# Patient Record
Sex: FEMALE | Race: WHITE | Employment: FULL TIME | URBAN - METROPOLITAN AREA
[De-identification: names, ages, dates, MRNs, and addresses within clinical notes are randomized per-mention and may not be internally consistent; named-entity substitution may affect disease eponyms.]

---

## 2017-01-05 ENCOUNTER — OFFICE VISIT (OUTPATIENT)
Dept: FAMILY MEDICINE CLINIC | Facility: CLINIC | Age: 62
End: 2017-01-05

## 2017-01-05 VITALS
HEART RATE: 73 BPM | HEIGHT: 61.18 IN | SYSTOLIC BLOOD PRESSURE: 139 MMHG | BODY MASS INDEX: 25.23 KG/M2 | TEMPERATURE: 99 F | WEIGHT: 133.63 LBS | DIASTOLIC BLOOD PRESSURE: 78 MMHG | RESPIRATION RATE: 17 BRPM

## 2017-01-05 DIAGNOSIS — Z00.00 ROUTINE PHYSICAL EXAMINATION: Primary | ICD-10-CM

## 2017-01-05 DIAGNOSIS — F32.A DEPRESSION, UNSPECIFIED DEPRESSION TYPE: ICD-10-CM

## 2017-01-05 DIAGNOSIS — Z12.31 ENCOUNTER FOR SCREENING MAMMOGRAM FOR BREAST CANCER: ICD-10-CM

## 2017-01-05 DIAGNOSIS — J01.90 SUBACUTE SINUSITIS, UNSPECIFIED LOCATION: ICD-10-CM

## 2017-01-05 DIAGNOSIS — J45.30 MILD PERSISTENT ASTHMA WITHOUT COMPLICATION: ICD-10-CM

## 2017-01-05 DIAGNOSIS — L30.9 ECZEMA, UNSPECIFIED TYPE: ICD-10-CM

## 2017-01-05 DIAGNOSIS — R04.0 EPISTAXIS: ICD-10-CM

## 2017-01-05 PROCEDURE — 99396 PREV VISIT EST AGE 40-64: CPT | Performed by: FAMILY MEDICINE

## 2017-01-05 PROCEDURE — 90471 IMMUNIZATION ADMIN: CPT | Performed by: FAMILY MEDICINE

## 2017-01-05 PROCEDURE — 90736 HZV VACCINE LIVE SUBQ: CPT | Performed by: FAMILY MEDICINE

## 2017-01-05 RX ORDER — CHOLECALCIFEROL (VITAMIN D3) 50 MCG
2000 TABLET ORAL DAILY
Qty: 90 TABLET | Refills: 3 | Status: SHIPPED | OUTPATIENT
Start: 2017-01-05 | End: 2017-02-04

## 2017-01-05 RX ORDER — AZITHROMYCIN 250 MG/1
TABLET, FILM COATED ORAL
Qty: 12 TABLET | Refills: 0 | Status: SHIPPED | OUTPATIENT
Start: 2017-01-05 | End: 2017-02-07

## 2017-01-05 NOTE — PROGRESS NOTES
HPI:    Patient ID: Drew Salazar is a 62year old female. HPI    Review of Systems   Constitutional: Negative. HENT: Positive for postnasal drip and sinus pressure. Respiratory: Negative. Cardiovascular: Negative.     Gastrointestinal: Nega normal.   Nasal mucosa swollen, maxillary sinus tenderness   Neck: No thyromegaly present. Cardiovascular: Normal rate, regular rhythm and normal heart sounds. No murmur heard.   Pulmonary/Chest: Effort normal and breath sounds normal.   Breasts withou Cholecalciferol (VITAMIN D) 2000 UNITS Oral Tab 90 tablet 3      Sig: Take 2,000 Int'l Units by mouth daily. azithromycin (ZITHROMAX Z-MEME) 250 MG Oral Tab 12 tablet 0      Sig: Take two tablets by mouth today, then one tablet daily. Then repeat.

## 2017-01-06 LAB
ABSOLUTE BASOPHILS: 67 CELLS/UL (ref 0–200)
ABSOLUTE EOSINOPHILS: 840 CELLS/UL (ref 15–500)
ABSOLUTE LYMPHOCYTES: 1663 CELLS/UL (ref 850–3900)
ABSOLUTE MONOCYTES: 370 CELLS/UL (ref 200–950)
ABSOLUTE NEUTROPHILS: 5460 CELLS/UL (ref 1500–7800)
BASOPHILS: 0.8 %
BUN/CREATININE RATIO: 16 (CALC) (ref 6–22)
BUN: 16 MG/DL (ref 7–25)
CALCIUM: 9.5 MG/DL (ref 8.6–10.4)
CARBON DIOXIDE: 31 MMOL/L (ref 20–31)
CHLORIDE: 101 MMOL/L (ref 98–110)
CHOL/HDLC RATIO: 2.6 (CALC)
CHOLESTEROL, TOTAL: 253 MG/DL (ref 125–200)
CREATININE: 1 MG/DL (ref 0.5–0.99)
EGFR IF AFRICN AM: 71 ML/MIN/1.73M2
EGFR IF NONAFRICN AM: 61 ML/MIN/1.73M2
EOSINOPHILS: 10 %
GLUCOSE: 78 MG/DL (ref 65–99)
HDL CHOLESTEROL: 97 MG/DL
HEMATOCRIT: 40.9 % (ref 35–45)
HEMOGLOBIN: 13.8 G/DL (ref 11.7–15.5)
LDL-CHOLESTEROL: 124 MG/DL (CALC)
LYMPHOCYTES: 19.8 %
MCH: 29.8 PG (ref 27–33)
MCHC: 33.7 G/DL (ref 32–36)
MCV: 88.4 FL (ref 80–100)
MONOCYTES: 4.4 %
MPV: 9.5 FL (ref 7.5–11.5)
NEUTROPHILS: 65 %
NON-HDL CHOLESTEROL: 156 MG/DL (CALC)
PLATELET COUNT: 263 THOUSAND/UL (ref 140–400)
POTASSIUM: 3.8 MMOL/L (ref 3.5–5.3)
RDW: 13.9 % (ref 11–15)
RED BLOOD CELL COUNT: 4.62 MILLION/UL (ref 3.8–5.1)
SODIUM: 140 MMOL/L (ref 135–146)
TRIGLYCERIDES: 159 MG/DL
TSH: 2.07 MIU/L (ref 0.4–4.5)
WHITE BLOOD CELL COUNT: 8.4 THOUSAND/UL (ref 3.8–10.8)

## 2017-01-23 ENCOUNTER — OFFICE VISIT (OUTPATIENT)
Dept: ALLERGY | Facility: CLINIC | Age: 62
End: 2017-01-23

## 2017-01-23 ENCOUNTER — NURSE ONLY (OUTPATIENT)
Dept: ALLERGY | Facility: CLINIC | Age: 62
End: 2017-01-23

## 2017-01-23 VITALS
HEIGHT: 61 IN | RESPIRATION RATE: 18 BRPM | DIASTOLIC BLOOD PRESSURE: 98 MMHG | WEIGHT: 133.38 LBS | SYSTOLIC BLOOD PRESSURE: 140 MMHG | TEMPERATURE: 98 F | HEART RATE: 72 BPM | BODY MASS INDEX: 25.18 KG/M2

## 2017-01-23 DIAGNOSIS — J30.89 ENVIRONMENTAL AND SEASONAL ALLERGIES: Primary | ICD-10-CM

## 2017-01-23 DIAGNOSIS — J45.30 MILD PERSISTENT ASTHMA WITHOUT COMPLICATION: Primary | ICD-10-CM

## 2017-01-23 DIAGNOSIS — Z91.09 ENVIRONMENTAL ALLERGIES: ICD-10-CM

## 2017-01-23 DIAGNOSIS — L30.9 ECZEMA OF BOTH HANDS: ICD-10-CM

## 2017-01-23 PROCEDURE — 99212 OFFICE O/P EST SF 10 MIN: CPT | Performed by: ALLERGY & IMMUNOLOGY

## 2017-01-23 PROCEDURE — 99244 OFF/OP CNSLTJ NEW/EST MOD 40: CPT | Performed by: ALLERGY & IMMUNOLOGY

## 2017-01-23 PROCEDURE — 95004 PERQ TESTS W/ALRGNC XTRCS: CPT | Performed by: ALLERGY & IMMUNOLOGY

## 2017-01-23 PROCEDURE — 94060 EVALUATION OF WHEEZING: CPT | Performed by: ALLERGY & IMMUNOLOGY

## 2017-01-23 NOTE — PROGRESS NOTES
Jose Roberto Lee is a 62year old female.     HPI:   Patient presents with:  Eczema  Asthma    Patient is a 61-year-old female who presents for allergy evaluation upon referral of her PCP, Dr. Karl Barclay with a chief complaint of asthma and eczema     Tanya • Esophageal reflux    • Diverticulosis of colon 2008   • DJD (degenerative joint disease), lumbar 2010     DJD hips and lumbar. Physical therapy   • DJD (degenerative joint disease) of hip 2010     DJD hips and lumbar.  Physical therapy   • Sinusitis EVERY NIGHT AT BEDTIME Disp: 30 tablet Rfl: 0   Zolpidem Tartrate ER (AMBIEN CR) 12.5 MG Oral Tab CR Take 1 tablet by mouth nightly as needed for Sleep. Disp: 30 tablet Rfl: 1   Fexofenadine HCl (ALLEGRA) 60 MG Oral Tab Take  by mouth.  Disp:  Rfl:    azith Cardiovascular: regular rate and rhythm no murmurs, gallups, or rubs  Abdomen: soft non-tender non-distended  Skin/Hair: Xerosis to hands bilaterally. No significant erythema noted.   Extremities: no edema, cyanosis, or clubbing  Neurological:Oriented to erythema    Recs: Handouts on atopic dermatitis provided and reviewed including the mainstay of treatment being moisturizers frequently throughout the day, avoiding irritants and triggers and topical steroids as needed based upon the right and referral  Re

## 2017-01-31 ENCOUNTER — PATIENT MESSAGE (OUTPATIENT)
Dept: ALLERGY | Facility: CLINIC | Age: 62
End: 2017-01-31

## 2017-02-01 NOTE — TELEPHONE ENCOUNTER
From: Sumi Reinoso  To: Anuradha Gonzalez MD  Sent: 1/31/2017 11:13 PM CST  Subject: Prescription Question    Could I get a prescription for an intal inhaler?

## 2017-02-01 NOTE — TELEPHONE ENCOUNTER
Patient returns call, requesting rx for the inhaler below, reports would like to get it from out of the [place]. Reviewed with Dr. Wesly Rooney, would like to discuss and evaluate current symptoms/status at 2/07/17 f/u. Patient agreeable to plan.

## 2017-02-03 ENCOUNTER — HOSPITAL ENCOUNTER (OUTPATIENT)
Dept: MAMMOGRAPHY | Facility: HOSPITAL | Age: 62
Discharge: HOME | End: 2017-02-03
Attending: FAMILY MEDICINE
Payer: COMMERCIAL

## 2017-02-03 DIAGNOSIS — Z12.31 ENCOUNTER FOR SCREENING MAMMOGRAM FOR BREAST CANCER: ICD-10-CM

## 2017-02-03 PROCEDURE — 77067 SCR MAMMO BI INCL CAD: CPT

## 2017-02-07 ENCOUNTER — OFFICE VISIT (OUTPATIENT)
Dept: ALLERGY | Facility: CLINIC | Age: 62
End: 2017-02-07

## 2017-02-07 ENCOUNTER — NURSE ONLY (OUTPATIENT)
Dept: ALLERGY | Facility: CLINIC | Age: 62
End: 2017-02-07

## 2017-02-07 VITALS
SYSTOLIC BLOOD PRESSURE: 154 MMHG | BODY MASS INDEX: 25.11 KG/M2 | DIASTOLIC BLOOD PRESSURE: 86 MMHG | HEART RATE: 76 BPM | HEIGHT: 61 IN | OXYGEN SATURATION: 95 % | TEMPERATURE: 99 F | WEIGHT: 133 LBS | RESPIRATION RATE: 16 BRPM

## 2017-02-07 DIAGNOSIS — Z91.09 ENVIRONMENTAL ALLERGIES: Primary | ICD-10-CM

## 2017-02-07 DIAGNOSIS — L30.9 ECZEMA OF BOTH HANDS: ICD-10-CM

## 2017-02-07 DIAGNOSIS — Z91.09 ENVIRONMENTAL ALLERGIES: ICD-10-CM

## 2017-02-07 DIAGNOSIS — J45.30 MILD PERSISTENT ASTHMA WITHOUT COMPLICATION: Primary | ICD-10-CM

## 2017-02-07 PROCEDURE — 99213 OFFICE O/P EST LOW 20 MIN: CPT | Performed by: ALLERGY & IMMUNOLOGY

## 2017-02-07 PROCEDURE — 99214 OFFICE O/P EST MOD 30 MIN: CPT | Performed by: ALLERGY & IMMUNOLOGY

## 2017-02-07 PROCEDURE — 95024 IQ TESTS W/ALLERGENIC XTRCS: CPT | Performed by: ALLERGY & IMMUNOLOGY

## 2017-02-07 RX ORDER — ALBUTEROL SULFATE 90 UG/1
AEROSOL, METERED RESPIRATORY (INHALATION)
Qty: 1 INHALER | Refills: 0 | Status: SHIPPED | OUTPATIENT
Start: 2017-02-07 | End: 2018-07-03

## 2017-02-07 NOTE — PROGRESS NOTES
Bia Londono is a 62year old female. HPI:   Patient presents with:  Asthma  Eczema    Patient is a 28-year-old female who presents for follow-up with a chief complaint of asthma and eczema. Patient was last seen on January 23, 2017.   Patient ha scanned to media tab    BREAST BIOPSY Right 2012    Comment Rush    SKIN SURGERY  2005    Comment scalp cyst ex x 3    SKIN SURGERY  1/16/2015    Comment excision scalp cysts x2    BOLA NEEDLE LOCALIZATION W/ SPECIMEN 1 SITE RIGHT        Family History   Pr loss, irritability and lethargy  ENMT:  Negative for ear drainage, hearing loss and nasal drainage  Eyes:  Negative for eye discharge and vision loss  Gastrointestinal:  Negative for abdominal pain, diarrhea and vomiting  Integumentary:  Negative for pruri with Intal in the past was efficacious in treating her asthma. Patient wishes to proceed with possible and has located a pharmacy in [facility]) that could provide Intal with a prescription.   Reviewed with patient that inhaled steroids are considered more effi

## 2017-02-28 ENCOUNTER — PATIENT MESSAGE (OUTPATIENT)
Dept: ALLERGY | Facility: CLINIC | Age: 62
End: 2017-02-28

## 2017-02-28 RX ORDER — MOMETASONE FUROATE 220 UG/1
INHALANT RESPIRATORY (INHALATION)
Qty: 3 INHALER | Refills: 3 | Status: SHIPPED | OUTPATIENT
Start: 2017-02-28 | End: 2019-05-02

## 2017-02-28 NOTE — TELEPHONE ENCOUNTER
From: Claudine Luis  To: Phong Hunter MD  Sent: 2/28/2017 2:59 PM CST  Subject: Prescription Question    On 2/7/2017 Dr. Allyson Gomez gave me a prescription for Intal 800 mcg.  When I tried to get the prescription filled I learned that the only Intal now

## 2017-03-01 ENCOUNTER — PATIENT MESSAGE (OUTPATIENT)
Dept: ALLERGY | Facility: CLINIC | Age: 62
End: 2017-03-01

## 2017-03-01 NOTE — TELEPHONE ENCOUNTER
Please call patient to confirm that the 500 µg (5mg) dosing she has referred to comes an inhaler/mdi formulation .   I have seen the 500mcg  dosing online as  nasal spray and just want to confirm that that same dosing, 500 µg comes in a MDI/inhaler formulat

## 2017-03-01 NOTE — TELEPHONE ENCOUNTER
Dr. Wesly Rooney, please refer to 2/28/17 encounter. Pt has verified that prescription requested is, Intal 5 mg MDI inhaler.  Thank you

## 2017-03-01 NOTE — TELEPHONE ENCOUNTER
[phone] (H)  [phone] (W)  Pt contacted and stts that she will contact pharmacy as she is not sure if rx requesting is nasal or MDI formulation. Pt stts that she will verify that Intal 5 mg is MDI inhaler as requested and not nasal spray.  Pt will

## 2017-03-01 NOTE — TELEPHONE ENCOUNTER
From: Vanessa Mckenzie  To: Luz Elena Mcneil MD  Sent: 3/1/2017 8:25 AM CST  Subject: Prescription Question    I checked and the Intal 5 mg is an inhaler and not a nasal spray.   thank you

## 2017-03-01 NOTE — TELEPHONE ENCOUNTER
Call reviewed and noted. Intal 5 mg inhaler currently not in our EMR system in [facility]. A written prescription for Intal 5 mg inhaler 2 puffs 4 times daily provided. Dispense 1 inhaler with 5 refills. Please mail to patient.   Please notify patient as well

## 2017-03-01 NOTE — TELEPHONE ENCOUNTER
From: Rachana Manriquez  To: Paige Cain MD  Sent: 3/1/2017 8:25 AM CST  Subject: Prescription Question    I checked and the Intal 5 mg is an inhaler and not a nasal spray.   thank you    [phone] (H)  [phone] (W)  LM that rx placed at front

## 2017-03-17 RX ORDER — BUPROPION HYDROCHLORIDE 150 MG/1
TABLET ORAL
Qty: 30 TABLET | Refills: 2 | Status: SHIPPED | OUTPATIENT
Start: 2017-03-17 | End: 2017-08-23

## 2017-04-14 RX ORDER — MONTELUKAST SODIUM 10 MG/1
TABLET ORAL
Qty: 30 TABLET | Refills: 3 | Status: SHIPPED | OUTPATIENT
Start: 2017-04-14 | End: 2017-09-08

## 2017-06-26 NOTE — TELEPHONE ENCOUNTER
Left message for patient that requested medication was sent to pharmacy. Any additional questions to please call our office.

## 2017-06-26 NOTE — TELEPHONE ENCOUNTER
Refill requested for:    triamcinolone acetonide 0.1 % External Cream 80 g 0 2/7/2017    Sig :  Apply topically 2 (two) times daily. Route:   Topical         Last office visit:  2/07/17. Previously advised to follow-up in:  6-12 months.    F/u currentl

## 2017-08-23 RX ORDER — MONTELUKAST SODIUM 10 MG/1
TABLET ORAL
Qty: 30 TABLET | Refills: 3 | OUTPATIENT
Start: 2017-08-23

## 2017-08-23 RX ORDER — BUPROPION HYDROCHLORIDE 150 MG/1
TABLET ORAL
Qty: 30 TABLET | Refills: 2 | Status: SHIPPED | OUTPATIENT
Start: 2017-08-23 | End: 2017-10-18

## 2017-08-23 NOTE — TELEPHONE ENCOUNTER
LRF 04/14/17 #30 with 3 refills.   JDO please advise on refill request.      Recent Outpatient Visits            6 months ago Environmental allergies    [address], [facility]    [facility]    6 months ago Mild persistent asthma without

## 2017-08-29 RX ORDER — MONTELUKAST SODIUM 10 MG/1
10 TABLET ORAL NIGHTLY
Qty: 30 TABLET | Refills: 3 | Status: SHIPPED
Start: 2017-08-29 | End: 2018-11-09

## 2017-09-08 ENCOUNTER — OFFICE VISIT (OUTPATIENT)
Dept: OTOLARYNGOLOGY | Facility: CLINIC | Age: 62
End: 2017-09-08

## 2017-09-08 VITALS
WEIGHT: 128 LBS | DIASTOLIC BLOOD PRESSURE: 85 MMHG | TEMPERATURE: 98 F | BODY MASS INDEX: 23.55 KG/M2 | SYSTOLIC BLOOD PRESSURE: 137 MMHG | HEIGHT: 62 IN

## 2017-09-08 DIAGNOSIS — J34.2 DEVIATED NASAL SEPTUM: Primary | ICD-10-CM

## 2017-09-08 DIAGNOSIS — J30.89 NON-SEASONAL ALLERGIC RHINITIS, UNSPECIFIED CHRONICITY, UNSPECIFIED TRIGGER: ICD-10-CM

## 2017-09-08 PROCEDURE — 99212 OFFICE O/P EST SF 10 MIN: CPT | Performed by: OTOLARYNGOLOGY

## 2017-09-08 PROCEDURE — 99214 OFFICE O/P EST MOD 30 MIN: CPT | Performed by: OTOLARYNGOLOGY

## 2017-09-08 RX ORDER — FLUTICASONE PROPIONATE 50 MCG
1 SPRAY, SUSPENSION (ML) NASAL 2 TIMES DAILY
Qty: 1 BOTTLE | Refills: 11 | Status: SHIPPED | OUTPATIENT
Start: 2017-09-08 | End: 2018-09-29

## 2017-09-08 RX ORDER — MONTELUKAST SODIUM 10 MG/1
10 TABLET ORAL NIGHTLY
Qty: 30 TABLET | Refills: 11 | Status: SHIPPED | OUTPATIENT
Start: 2017-09-08 | End: 2017-10-18

## 2017-09-08 NOTE — PROGRESS NOTES
Gisell Yoo is a 65year old female. Patient presents with: Follow - Up: medication refill for montelukast- LOV 9/25/15      HISTORY OF PRESENT ILLNESS    Long history of chronic allergy issues.  She was tested over 30 years ago and found to have al Main Topics    Smoking status: Never Smoker                                                                Comment: No household smokers. Alcohol use:  Yes                Comment: 4 drinks weekly  Other Topics            Concern  Caffeine Concern Easy bleeding and easy bruising.            PHYSICAL EXAM    /85   Temp 97.9 °F (36.6 °C) (Tympanic)   Ht 5' 2\" (1.575 m)   Wt 128 lb (58.1 kg)   BMI 23.41 kg/m²        Constitutional Normal Overall appearance - Normal.   Psychiatric Normal Orientati LUNGS DAILY. , Disp: 3 Inhaler, Rfl: 3  •  Albuterol Sulfate HFA (PROAIR HFA) 108 (90 Base) MCG/ACT Inhalation Aero Soln, INHALE 2 PUFFS BY MOUTH FOUR TIMES DAILY AS NEEDED, Disp: 1 Inhaler, Rfl: 0  •  SERTRALINE HCL 50 MG Oral Tab, TAKE 1 TABLET BY MOUTH E

## 2017-10-18 ENCOUNTER — NURSE TRIAGE (OUTPATIENT)
Dept: OTHER | Age: 62
End: 2017-10-18

## 2017-10-18 ENCOUNTER — HOSPITAL ENCOUNTER (OUTPATIENT)
Dept: GENERAL RADIOLOGY | Age: 62
Discharge: HOME | End: 2017-10-18
Attending: FAMILY MEDICINE
Payer: COMMERCIAL

## 2017-10-18 ENCOUNTER — OFFICE VISIT (OUTPATIENT)
Dept: FAMILY MEDICINE CLINIC | Facility: CLINIC | Age: 62
End: 2017-10-18

## 2017-10-18 VITALS
SYSTOLIC BLOOD PRESSURE: 134 MMHG | RESPIRATION RATE: 16 BRPM | HEIGHT: 61.5 IN | HEART RATE: 80 BPM | TEMPERATURE: 99 F | DIASTOLIC BLOOD PRESSURE: 82 MMHG

## 2017-10-18 DIAGNOSIS — S39.92XA INJURY OF LOW BACK, INITIAL ENCOUNTER: Primary | ICD-10-CM

## 2017-10-18 DIAGNOSIS — S39.92XA INJURY OF LOW BACK, INITIAL ENCOUNTER: ICD-10-CM

## 2017-10-18 DIAGNOSIS — G47.00 INSOMNIA, UNSPECIFIED TYPE: ICD-10-CM

## 2017-10-18 PROCEDURE — 99212 OFFICE O/P EST SF 10 MIN: CPT | Performed by: FAMILY MEDICINE

## 2017-10-18 PROCEDURE — 99214 OFFICE O/P EST MOD 30 MIN: CPT | Performed by: FAMILY MEDICINE

## 2017-10-18 PROCEDURE — 72110 X-RAY EXAM L-2 SPINE 4/>VWS: CPT | Performed by: FAMILY MEDICINE

## 2017-10-18 RX ORDER — CYCLOBENZAPRINE HCL 5 MG
5 TABLET ORAL NIGHTLY
Qty: 15 TABLET | Refills: 0 | Status: SHIPPED | OUTPATIENT
Start: 2017-10-18 | End: 2018-01-02 | Stop reason: ALTCHOICE

## 2017-10-18 RX ORDER — ZOLPIDEM TARTRATE 12.5 MG/1
12.5 TABLET, FILM COATED, EXTENDED RELEASE ORAL NIGHTLY PRN
Qty: 30 TABLET | Refills: 1 | Status: SHIPPED | OUTPATIENT
Start: 2017-10-18 | End: 2018-01-10

## 2017-10-18 NOTE — TELEPHONE ENCOUNTER
Action Requested: Summary for Provider     []  Critical Lab, Recommendations Needed  [] Need Additional Advice  []   FYI    []   Need Orders  [] Need Medications Sent to Pharmacy  []  Other     SUMMARY: Appointment made with Dr Ximena Amaral today 10/18/17 at

## 2017-10-19 ENCOUNTER — TELEPHONE (OUTPATIENT)
Dept: FAMILY MEDICINE CLINIC | Facility: CLINIC | Age: 62
End: 2017-10-19

## 2017-10-21 NOTE — H&P
HPI:    Casimiro Balbuena is a 65year old female presents to clinic after sustaining a back injury on Saturday. States that she slipped and ell on her back and slid down the stairs. Notes that she was able to get up and walk right away.  She did not hit Prescriptions:  Zolpidem Tartrate ER (AMBIEN CR) 12.5 MG Oral Tab CR Take 1 tablet (12.5 mg total) by mouth nightly as needed for Sleep. Disp: 30 tablet Rfl: 1   Cyclobenzaprine HCl 5 MG Oral Tab Take 1 tablet (5 mg total) by mouth nightly.  Disp: 15 tablet ROM on forward and backward bending- WNL, + point tenderness over right side of lower back, gait antalgic      Neurological: She is alert. Vitals reviewed.       ASSESSMENT/PLAN:   Injury of low back, initial encounter  (primary encounter diagnosis)  - XR

## 2017-10-23 NOTE — TELEPHONE ENCOUNTER
Addressed by provider as per result note:    Notes Recorded by Phong Ortiz MD on 10/21/2017 at 11:07 AM CDT  Patient informed of XR results, no fracture seen.  If no improvement in 2-3 days, will send to PT

## 2017-11-25 RX ORDER — BUPROPION HYDROCHLORIDE 150 MG/1
TABLET ORAL
Qty: 30 TABLET | Refills: 2 | Status: SHIPPED | OUTPATIENT
Start: 2017-11-25 | End: 2018-02-23

## 2017-11-28 ENCOUNTER — OFFICE VISIT (OUTPATIENT)
Dept: FAMILY MEDICINE CLINIC | Facility: CLINIC | Age: 62
End: 2017-11-28

## 2017-11-28 VITALS
HEIGHT: 61.5 IN | RESPIRATION RATE: 17 BRPM | BODY MASS INDEX: 24.82 KG/M2 | HEART RATE: 94 BPM | WEIGHT: 133.19 LBS | TEMPERATURE: 99 F | DIASTOLIC BLOOD PRESSURE: 80 MMHG | SYSTOLIC BLOOD PRESSURE: 154 MMHG

## 2017-11-28 DIAGNOSIS — R03.0 ELEVATED BLOOD PRESSURE READING WITHOUT DIAGNOSIS OF HYPERTENSION: ICD-10-CM

## 2017-11-28 DIAGNOSIS — J01.90 ACUTE SINUSITIS, RECURRENCE NOT SPECIFIED, UNSPECIFIED LOCATION: Primary | ICD-10-CM

## 2017-11-28 PROCEDURE — 99212 OFFICE O/P EST SF 10 MIN: CPT | Performed by: FAMILY MEDICINE

## 2017-11-28 PROCEDURE — 99213 OFFICE O/P EST LOW 20 MIN: CPT | Performed by: FAMILY MEDICINE

## 2017-11-28 RX ORDER — AMOXICILLIN AND CLAVULANATE POTASSIUM 875; 125 MG/1; MG/1
1 TABLET, FILM COATED ORAL 2 TIMES DAILY
Qty: 20 TABLET | Refills: 0 | Status: SHIPPED | OUTPATIENT
Start: 2017-11-28 | End: 2017-11-29

## 2017-11-28 NOTE — PROGRESS NOTES
HPI:    Patient ID: Vanessa Mckenzie is a 65year old female. See below        Review of Systems         Current Outpatient Prescriptions:  Amoxicillin-Pot Clavulanate 875-125 MG Oral Tab Take 1 tablet by mouth 2 (two) times daily.  Disp: 20 tablet Rfl: sinus tenderness. Mouth/Throat: Mucous membranes are not dry. No uvula swelling. Posterior oropharyngeal erythema present. No oropharyngeal exudate. Eyes: Conjunctivae are normal.   Neck: No neck rigidity.    Cardiovascular: Normal rate and regular rhyt

## 2017-11-29 ENCOUNTER — TELEPHONE (OUTPATIENT)
Dept: OTHER | Age: 62
End: 2017-11-29

## 2017-11-29 RX ORDER — AZITHROMYCIN 250 MG/1
TABLET, FILM COATED ORAL
Qty: 6 TABLET | Refills: 0 | Status: SHIPPED | OUTPATIENT
Start: 2017-11-29 | End: 2018-01-02 | Stop reason: ALTCHOICE

## 2017-11-29 RX ORDER — ONDANSETRON HYDROCHLORIDE 8 MG/1
8 TABLET, FILM COATED ORAL EVERY 8 HOURS PRN
Qty: 15 TABLET | Refills: 0 | Status: SHIPPED | OUTPATIENT
Start: 2017-11-29 | End: 2018-01-02 | Stop reason: ALTCHOICE

## 2017-11-29 RX ORDER — AZITHROMYCIN 250 MG/1
TABLET, FILM COATED ORAL
Qty: 6 TABLET | Refills: 0 | OUTPATIENT
Start: 2017-11-29 | End: 2017-11-29

## 2017-11-29 RX ORDER — ONDANSETRON HYDROCHLORIDE 8 MG/1
8 TABLET, FILM COATED ORAL EVERY 8 HOURS PRN
Qty: 15 TABLET | Refills: 0 | OUTPATIENT
Start: 2017-11-29 | End: 2017-11-29

## 2017-11-29 NOTE — TELEPHONE ENCOUNTER
Call [phone] pt vomited 4 more times. Wants rx sent to [facility] near her work doesn't have phone number will get it ready for when we call back.

## 2017-11-29 NOTE — TELEPHONE ENCOUNTER
Please let patient know I am sorry what reaction, I have marked her chart for future reference. Orders entered for Zofran and Z-Storm as directed. Recommend starting Z-Storm tomorrow when nausea is improved.

## 2017-11-29 NOTE — TELEPHONE ENCOUNTER
Patient called again and states she can't stop vomiting. She denied sob, no stomach pain; She is at work     [address] [place] : bathroom. First floor. Patient denied feeling dizzy.   Patient asked for ambulance; then vomited, and felt ambulan

## 2017-11-29 NOTE — TELEPHONE ENCOUNTER
Pt called reports threw up x2 after taking first dose of antibiotic it has made her very nauseous. Requesting different antibiotic and something for nausea. Pt declines any abdominal pain, reports she had cereal and crackers with pill. Please advise.  Vomit

## 2017-11-29 NOTE — TELEPHONE ENCOUNTER
Pt states pharmacy has not received the Zofran prescription, informed her prescription was sent to [facility] as requested. Contacted pharmacy, prescription was received and pt will be notified when med ready for . Notified pt.

## 2018-01-02 ENCOUNTER — OFFICE VISIT (OUTPATIENT)
Dept: FAMILY MEDICINE CLINIC | Facility: CLINIC | Age: 63
End: 2018-01-02

## 2018-01-02 VITALS
BODY MASS INDEX: 25.18 KG/M2 | TEMPERATURE: 99 F | HEART RATE: 71 BPM | HEIGHT: 61.22 IN | DIASTOLIC BLOOD PRESSURE: 94 MMHG | WEIGHT: 133.38 LBS | RESPIRATION RATE: 16 BRPM | SYSTOLIC BLOOD PRESSURE: 140 MMHG

## 2018-01-02 DIAGNOSIS — R03.0 ELEVATED BLOOD PRESSURE READING WITHOUT DIAGNOSIS OF HYPERTENSION: ICD-10-CM

## 2018-01-02 DIAGNOSIS — L30.9 DERMATITIS: ICD-10-CM

## 2018-01-02 DIAGNOSIS — Z12.31 ENCOUNTER FOR SCREENING MAMMOGRAM FOR BREAST CANCER: ICD-10-CM

## 2018-01-02 DIAGNOSIS — Z00.00 ROUTINE PHYSICAL EXAMINATION: Primary | ICD-10-CM

## 2018-01-02 DIAGNOSIS — Z12.11 COLON CANCER SCREENING: ICD-10-CM

## 2018-01-02 DIAGNOSIS — R92.2 DENSE BREAST: ICD-10-CM

## 2018-01-02 PROCEDURE — 90686 IIV4 VACC NO PRSV 0.5 ML IM: CPT | Performed by: FAMILY MEDICINE

## 2018-01-02 PROCEDURE — 99396 PREV VISIT EST AGE 40-64: CPT | Performed by: FAMILY MEDICINE

## 2018-01-02 PROCEDURE — 90715 TDAP VACCINE 7 YRS/> IM: CPT | Performed by: FAMILY MEDICINE

## 2018-01-02 PROCEDURE — 90471 IMMUNIZATION ADMIN: CPT | Performed by: FAMILY MEDICINE

## 2018-01-02 PROCEDURE — 90472 IMMUNIZATION ADMIN EACH ADD: CPT | Performed by: FAMILY MEDICINE

## 2018-01-02 NOTE — PATIENT INSTRUCTIONS
James Olivera    Address: [address] [place] [address], [address] [facility]    Phone: [phone]  Eating Heart-Healthy Food: Using the [address] for your heart doesn’t have to be hard or boring. Best choices: Skim or 1% milk, low-fat or fat-free yogurt or buttermilk, and low-fat cheeses.         Lean meats, poultry, fish  Servings: 6 or fewer a day  A serving is:  · 1 ounce cooked meats, poultry, or fish  · 1 egg  Best choices: Lean poultry and fi High blood pressure (hypertension) is often called the silent killer. This is because many people who have it don’t know it. High blood pressure is defined as 140/90 mm Hg or higher. Know your blood pressure and remember to check it regularly.  Doing so can · Communicate your concerns with your loved ones and your healthcare provider. · Visit with family and friends, and keep up with hobbies. Limit alcohol and quit smoking  · Men should have no more than 2 drinks per day.   · Women should have no more than 1

## 2018-01-02 NOTE — PROGRESS NOTES
HPI:    Patient ID: Collette Anguiano is a 65year old female. HPI    Review of Systems   Constitutional: Negative. Respiratory: Negative. Cardiovascular: Negative. Gastrointestinal: Negative. Skin: Positive for rash.    Neurological: Negativ She has no cervical adenopathy. Neurological: She is alert and oriented to person, place, and time. Skin: Skin is warm and dry.               ASSESSMENT/PLAN:   Routine physical examination  (primary encounter diagnosis)  Colon cancer screening  Encount (CUB=[id number])       [id number]

## 2018-01-03 LAB
ALBUMIN/GLOBULIN RATIO: 1.6 (CALC) (ref 1–2.5)
ALBUMIN: 4.3 G/DL (ref 3.6–5.1)
ALKALINE PHOSPHATASE: 76 U/L (ref 33–130)
ALT: 20 U/L (ref 6–29)
AST: 21 U/L (ref 10–35)
BILIRUBIN, TOTAL: 0.4 MG/DL (ref 0.2–1.2)
BUN: 14 MG/DL (ref 7–25)
CALCIUM: 9.3 MG/DL (ref 8.6–10.4)
CARBON DIOXIDE: 31 MMOL/L (ref 20–31)
CHLORIDE: 101 MMOL/L (ref 98–110)
CHOL/HDLC RATIO: 2.4 (CALC)
CHOLESTEROL, TOTAL: 214 MG/DL
CREATININE: 0.88 MG/DL (ref 0.5–0.99)
EGFR IF AFRICN AM: 82 ML/MIN/1.73M2
EGFR IF NONAFRICN AM: 71 ML/MIN/1.73M2
GLOBULIN: 2.7 G/DL (CALC) (ref 1.9–3.7)
GLUCOSE: 80 MG/DL (ref 65–99)
HDL CHOLESTEROL: 89 MG/DL
LDL-CHOLESTEROL: 101 MG/DL (CALC)
NON-HDL CHOLESTEROL: 125 MG/DL (CALC)
POTASSIUM: 3.8 MMOL/L (ref 3.5–5.3)
PROTEIN, TOTAL: 7 G/DL (ref 6.1–8.1)
SIGNAL TO CUT-OFF: 0.06
SODIUM: 138 MMOL/L (ref 135–146)
TRIGLYCERIDES: 143 MG/DL

## 2018-01-09 ENCOUNTER — PATIENT MESSAGE (OUTPATIENT)
Dept: FAMILY MEDICINE CLINIC | Facility: CLINIC | Age: 63
End: 2018-01-09

## 2018-01-10 RX ORDER — ZOLPIDEM TARTRATE 12.5 MG/1
12.5 TABLET, FILM COATED, EXTENDED RELEASE ORAL NIGHTLY PRN
Qty: 30 TABLET | Refills: 1 | OUTPATIENT
Start: 2018-01-10 | End: 2018-08-17

## 2018-01-10 NOTE — TELEPHONE ENCOUNTER
Please advise on my chart message below     From: Claudy Carter  To: Rory Harmon MD  Sent: 1/9/2018 12:11 PM CST  Subject: Prescription Question    In October you gave me a written Rx for a refill of Zolpidem Tartrate ER.  I lost the Rx and never fi

## 2018-01-12 RX ORDER — ZOLPIDEM TARTRATE 12.5 MG/1
TABLET, FILM COATED, EXTENDED RELEASE ORAL
Qty: 30 TABLET | OUTPATIENT
Start: 2018-01-12

## 2018-02-23 RX ORDER — BUPROPION HYDROCHLORIDE 150 MG/1
TABLET ORAL
Qty: 30 TABLET | Refills: 2 | Status: SHIPPED | OUTPATIENT
Start: 2018-02-23 | End: 2018-05-28

## 2018-03-21 ENCOUNTER — NURSE TRIAGE (OUTPATIENT)
Dept: OTHER | Age: 63
End: 2018-03-21

## 2018-03-21 NOTE — TELEPHONE ENCOUNTER
Action Requested: Summary for Provider     []  Critical Lab, Recommendations Needed  [x] Need Additional Advice  []   FYI    []   Need Orders  [] Need Medications Sent to Pharmacy  []  Other     SUMMARY: pt asking if she needs to be seen for pain in right

## 2018-03-21 NOTE — TELEPHONE ENCOUNTER
Reviewed doctor's recommendations with pt, pt agreed with plan of care. Pt also states she took Ibuprofen after calling earlier and pain has decreased. Pt had no further questions.

## 2018-03-21 NOTE — TELEPHONE ENCOUNTER
Agree with triage advice given. If patient calls back recommend evaluation if pain lasts more than 5 days, or if associated symptoms such as vomiting,  severe worsening.

## 2018-05-30 RX ORDER — BUPROPION HYDROCHLORIDE 150 MG/1
TABLET ORAL
Qty: 30 TABLET | Refills: 2 | Status: SHIPPED | OUTPATIENT
Start: 2018-05-30 | End: 2018-06-01

## 2018-05-30 NOTE — TELEPHONE ENCOUNTER
Per last OV:    Elevated blood pressure reading without diagnosis of hypertension– DASH diet information given, check home blood pressures follow-up here in 4-6 weeks     Depressive disorder–patient has been continuing on bupropion, sertraline and as neede

## 2018-05-30 NOTE — TELEPHONE ENCOUNTER
[facility], please see below. Do you want to wait to refill until OV? Please advise. Thanks.

## 2018-06-01 ENCOUNTER — OFFICE VISIT (OUTPATIENT)
Dept: FAMILY MEDICINE CLINIC | Facility: CLINIC | Age: 63
End: 2018-06-01

## 2018-06-01 VITALS
HEART RATE: 74 BPM | DIASTOLIC BLOOD PRESSURE: 92 MMHG | WEIGHT: 133 LBS | HEIGHT: 61.2 IN | TEMPERATURE: 98 F | SYSTOLIC BLOOD PRESSURE: 143 MMHG | BODY MASS INDEX: 25.11 KG/M2

## 2018-06-01 DIAGNOSIS — J45.30 MILD PERSISTENT ASTHMA WITHOUT COMPLICATION: ICD-10-CM

## 2018-06-01 DIAGNOSIS — F32.A DEPRESSION, UNSPECIFIED DEPRESSION TYPE: ICD-10-CM

## 2018-06-01 DIAGNOSIS — I10 ESSENTIAL HYPERTENSION: Primary | ICD-10-CM

## 2018-06-01 PROCEDURE — 99212 OFFICE O/P EST SF 10 MIN: CPT | Performed by: FAMILY MEDICINE

## 2018-06-01 PROCEDURE — 99213 OFFICE O/P EST LOW 20 MIN: CPT | Performed by: FAMILY MEDICINE

## 2018-06-01 RX ORDER — BUPROPION HYDROCHLORIDE 150 MG/1
TABLET ORAL
Qty: 90 TABLET | Refills: 1 | Status: SHIPPED | OUTPATIENT
Start: 2018-06-01 | End: 2018-08-30

## 2018-06-01 RX ORDER — DESONIDE 0.5 MG/G
OINTMENT TOPICAL
Refills: 0 | COMMUNITY
Start: 2018-03-05 | End: 2020-07-21 | Stop reason: ALTCHOICE

## 2018-06-01 RX ORDER — LISINOPRIL 10 MG/1
10 TABLET ORAL DAILY
Qty: 30 TABLET | Refills: 3 | Status: SHIPPED | OUTPATIENT
Start: 2018-06-01 | End: 2018-07-09 | Stop reason: SINTOL

## 2018-06-01 NOTE — PROGRESS NOTES
HPI:    Patient ID: Deja Flores is a 65year old female. Medication Follow-Up   Pertinent negatives include no chest pain or headaches. Hypertension   This is a new problem. The current episode started more than 1 month ago.  The problem is [facility] Allergies:  Augmentin [Amoxicil*    NAUSEA AND VOMITING  Fish                        Comment:Other reaction(s): Facial Swelling  Sulfa Antibiotics           Comment:Other reaction(s): anaphlaxsis   PHYSICAL EXAM:   Physical Exam   Constitutional: She i Imaging & Referrals:  None       ME#[id number]

## 2018-07-03 RX ORDER — ALBUTEROL SULFATE 90 UG/1
AEROSOL, METERED RESPIRATORY (INHALATION)
Qty: 1 INHALER | Refills: 0 | Status: SHIPPED | OUTPATIENT
Start: 2018-07-03 | End: 2018-09-06

## 2018-07-03 NOTE — TELEPHONE ENCOUNTER
Patient last seen in February 2017. At last visit patient was advised to follow-up in 6-12 months. Refill request on pro-air denied.   Patient will require follow-up appointment for refills

## 2018-07-03 NOTE — TELEPHONE ENCOUNTER
Please advise on refill request.   See patient message below.      Refill Protocol Appointment Criteria  · Appointment scheduled in the past 12 months or in the next 3 months  Recent Outpatient Visits            1 month ago Essential hypertension    [facility]

## 2018-07-03 NOTE — TELEPHONE ENCOUNTER
Current Outpatient Prescriptions:  Albuterol Sulfate HFA (PROAIR HFA) 108 (90 Base) MCG/ACT Inhalation Aero Soln INHALE 2 PUFFS BY MOUTH FOUR TIMES DAILY AS NEEDED Disp: 1 Inhaler Rfl: 0     Pt said she forgot to ask Dr Velia Muller for rx at last appt- rescue

## 2018-07-03 NOTE — TELEPHONE ENCOUNTER
[facility]

## 2018-07-03 NOTE — TELEPHONE ENCOUNTER
Received refill request for:    Medication Quantity Refills Last Filled    Albuterol Sulfate HFA (PROAIR HFA) 108 (90 Base) MCG/ACT Inhalation Aero Soln 1 Inhaler 0 2/7/2017    Sig :  INHALE 2 PUFFS BY MOUTH FOUR TIMES DAILY AS NEEDED       LOV: 2/7/17

## 2018-07-09 ENCOUNTER — TELEPHONE (OUTPATIENT)
Dept: FAMILY MEDICINE CLINIC | Facility: CLINIC | Age: 63
End: 2018-07-09

## 2018-07-09 RX ORDER — LOSARTAN POTASSIUM 100 MG/1
100 TABLET ORAL DAILY
Qty: 30 TABLET | Refills: 3 | Status: SHIPPED | OUTPATIENT
Start: 2018-07-09 | End: 2018-11-02

## 2018-07-09 NOTE — TELEPHONE ENCOUNTER
Pt was started on lisinopril on 6/1, states approx a week later she began to experience \"uncontrollable coughing\" and would like Dr Jordon Fraire to prescribed an alternative. Please advise.

## 2018-07-09 NOTE — TELEPHONE ENCOUNTER
Please let her know I have  send Rx to pharmacy for losartan 100 mg daily. Stop lisinopril.   We currently have follow-up scheduled 7/12 but recommend changing this to about 3 weeks from now to allow assessment of effectiveness with medication change

## 2018-07-10 NOTE — TELEPHONE ENCOUNTER
Advised patient of Dr. Ventura Neither note. Patient verbalized understanding  Patient states she looked up side effects of losartan and noticed that is also has a side effect of cough.      Patient wants to know if she starts losartan can she still possibly get a

## 2018-07-12 NOTE — TELEPHONE ENCOUNTER
Advised patient of Dr. Bushra Cheatham note. Patient verbalized understanding  Patient stated she started the losartan last night and was up coughing most of the night.    Patient states she has a history of asthma and sinus problems so she does not know if this i

## 2018-08-18 NOTE — TELEPHONE ENCOUNTER
LOV: 6-1-18 Last Rx: 1-101-8     Please advise in regards to refill request. Thank You    Please respond to pool: GRAHAM PARRA OPO LPN/CMA

## 2018-08-20 RX ORDER — ZOLPIDEM TARTRATE 12.5 MG/1
TABLET, FILM COATED, EXTENDED RELEASE ORAL
Qty: 30 TABLET | Refills: 0 | Status: SHIPPED
Start: 2018-08-20 | End: 2020-03-03

## 2018-08-30 RX ORDER — BUPROPION HYDROCHLORIDE 150 MG/1
TABLET ORAL
Qty: 90 TABLET | Refills: 1 | Status: SHIPPED | OUTPATIENT
Start: 2018-08-30 | End: 2019-04-01

## 2018-09-28 DIAGNOSIS — J45.41 MODERATE PERSISTENT ASTHMA WITH ACUTE EXACERBATION: ICD-10-CM

## 2018-09-28 RX ORDER — ALBUTEROL SULFATE 90 UG/1
AEROSOL, METERED RESPIRATORY (INHALATION)
Qty: 1 INHALER | Refills: 2 | Status: SHIPPED | OUTPATIENT
Start: 2018-09-28 | End: 2020-04-23

## 2018-09-29 NOTE — TELEPHONE ENCOUNTER
Refill Protocol Appointment Criteria  · Appointment scheduled in the past 6 months or in the next 3 months  Recent Outpatient Visits            3 months ago Essential hypertension    [address] [facility] Sandra Navarrete MD    Office Visit

## 2018-10-01 RX ORDER — FLUTICASONE PROPIONATE 50 MCG
1 SPRAY, SUSPENSION (ML) NASAL 2 TIMES DAILY
Qty: 1 BOTTLE | Refills: 11 | Status: SHIPPED | OUTPATIENT
Start: 2018-10-01 | End: 2019-08-28 | Stop reason: ALTCHOICE

## 2018-10-20 RX ORDER — MONTELUKAST SODIUM 10 MG/1
10 TABLET ORAL NIGHTLY
Qty: 30 TABLET | Refills: 5 | OUTPATIENT
Start: 2018-10-20

## 2018-10-28 ENCOUNTER — HOSPITAL ENCOUNTER (OUTPATIENT)
Age: 63
Discharge: HOME | End: 2018-10-28
Payer: COMMERCIAL

## 2018-10-28 VITALS
OXYGEN SATURATION: 100 % | HEIGHT: 61 IN | SYSTOLIC BLOOD PRESSURE: 158 MMHG | HEART RATE: 70 BPM | TEMPERATURE: 98 F | RESPIRATION RATE: 22 BRPM | WEIGHT: 130 LBS | BODY MASS INDEX: 24.55 KG/M2 | DIASTOLIC BLOOD PRESSURE: 78 MMHG

## 2018-10-28 DIAGNOSIS — S05.01XA ABRASION OF RIGHT CORNEA, INITIAL ENCOUNTER: Primary | ICD-10-CM

## 2018-10-28 PROCEDURE — 99213 OFFICE O/P EST LOW 20 MIN: CPT

## 2018-10-28 PROCEDURE — 99204 OFFICE O/P NEW MOD 45 MIN: CPT

## 2018-10-28 RX ORDER — TOBRAMYCIN 3 MG/ML
2 SOLUTION/ DROPS OPHTHALMIC
Qty: 1 BOTTLE | Refills: 0 | Status: SHIPPED | OUTPATIENT
Start: 2018-10-28 | End: 2018-11-02

## 2018-10-28 RX ORDER — BENOXINATE HCL/FLUORESCEIN SOD 0.4%-0.25%
1 DROPS OPHTHALMIC (EYE) ONCE
Status: COMPLETED | OUTPATIENT
Start: 2018-10-28 | End: 2018-10-28

## 2018-10-28 NOTE — ED INITIAL ASSESSMENT (HPI)
Pt states she had her eyes tearing more then usual so she had been using a tissue to dab the tears away, but an hour and a half ago she dabbed her right eye and has had the feeling of having something in it since then. Pt denies pink tinged tears or pain.

## 2018-10-28 NOTE — ED PROVIDER NOTES
Patient Seen in: [facility]    History   Patient presents with:   Eye Visual Problem (opthalmic)    Stated Complaint: Rt eye injury    HPI    59-year-old female presents for complaint of right eye pain for the past severa HENT: Negative. Eyes: Positive for pain. Respiratory: Negative. Cardiovascular: Negative. Gastrointestinal: Negative. Genitourinary: Negative. Musculoskeletal: Negative. Skin: Negative. Neurological: Negative.     All other systems re Labs Reviewed - No data to display             MDM    Exam is consistent with a corneal abrasion. No foreign bodies noted. She is up-to-date on her tetanus. Negative Vincent's. No evidence of any orbital or periorbital cellulitis.   She is started on tob

## 2018-11-02 RX ORDER — LOSARTAN POTASSIUM 100 MG/1
TABLET ORAL
Qty: 30 TABLET | Refills: 2 | Status: SHIPPED | OUTPATIENT
Start: 2018-11-02 | End: 2019-01-28 | Stop reason: ALTCHOICE

## 2018-11-09 RX ORDER — MONTELUKAST SODIUM 10 MG/1
10 TABLET ORAL NIGHTLY
Qty: 90 TABLET | Refills: 2 | Status: SHIPPED | OUTPATIENT
Start: 2018-11-09 | End: 2019-08-08

## 2018-11-09 NOTE — TELEPHONE ENCOUNTER
Please advise on refill request.     Refill Protocol Appointment Criteria  · Appointment scheduled in the past 6 months or in the next 3 months  Recent Outpatient Visits            5 months ago Essential hypertension    [address]

## 2019-01-28 ENCOUNTER — OFFICE VISIT (OUTPATIENT)
Dept: FAMILY MEDICINE CLINIC | Facility: CLINIC | Age: 64
End: 2019-01-28
Payer: COMMERCIAL

## 2019-01-28 VITALS
HEART RATE: 80 BPM | TEMPERATURE: 98 F | WEIGHT: 138.38 LBS | BODY MASS INDEX: 25.47 KG/M2 | SYSTOLIC BLOOD PRESSURE: 140 MMHG | HEIGHT: 61.75 IN | RESPIRATION RATE: 18 BRPM | DIASTOLIC BLOOD PRESSURE: 89 MMHG

## 2019-01-28 DIAGNOSIS — Z12.11 COLON CANCER SCREENING: ICD-10-CM

## 2019-01-28 DIAGNOSIS — J06.9 UPPER RESPIRATORY TRACT INFECTION, UNSPECIFIED TYPE: ICD-10-CM

## 2019-01-28 DIAGNOSIS — I10 ESSENTIAL HYPERTENSION: ICD-10-CM

## 2019-01-28 DIAGNOSIS — Z01.419 ENCOUNTER FOR GYNECOLOGICAL EXAMINATION WITHOUT ABNORMAL FINDING: Primary | ICD-10-CM

## 2019-01-28 DIAGNOSIS — J45.41 MODERATE PERSISTENT ASTHMA WITH ACUTE EXACERBATION: ICD-10-CM

## 2019-01-28 DIAGNOSIS — F32.A DEPRESSION, UNSPECIFIED DEPRESSION TYPE: ICD-10-CM

## 2019-01-28 DIAGNOSIS — Z12.31 ENCOUNTER FOR SCREENING MAMMOGRAM FOR BREAST CANCER: ICD-10-CM

## 2019-01-28 DIAGNOSIS — L30.9 DERMATITIS: ICD-10-CM

## 2019-01-28 PROCEDURE — 99396 PREV VISIT EST AGE 40-64: CPT | Performed by: FAMILY MEDICINE

## 2019-01-28 RX ORDER — LOSARTAN POTASSIUM AND HYDROCHLOROTHIAZIDE 12.5; 1 MG/1; MG/1
1 TABLET ORAL DAILY
Qty: 90 TABLET | Refills: 3 | Status: SHIPPED | OUTPATIENT
Start: 2019-01-28 | End: 2019-11-09

## 2019-01-28 NOTE — PROGRESS NOTES
HPI:    Patient ID: Claudy Carter is a 59year old female. HPI    Review of Systems   Constitutional: Negative. HENT: Positive for postnasal drip. Respiratory: Positive for cough. Cardiovascular: Negative. Gastrointestinal: Negative. is oriented to person, place, and time. She appears well-developed and well-nourished. Neck: No thyromegaly present. Cardiovascular: Normal rate, regular rhythm and normal heart sounds. No murmur heard.   Pulmonary/Chest: Effort normal and breath soun Panel [E]      Basic Metabolic Panel (8) [E]      Flulaval 0.5 ml 6 mon and older Quad single dose PF (18258)      new ThinPrep PAP with HPV Reflex Request      Meds This Visit:  Requested Prescriptions     Signed Prescriptions Disp Refills   • Zoster Vac

## 2019-01-29 LAB
BUN: 19 MG/DL (ref 7–25)
CALCIUM: 9 MG/DL (ref 8.6–10.4)
CARBON DIOXIDE: 23 MMOL/L (ref 20–32)
CHLORIDE: 107 MMOL/L (ref 98–110)
CHOL/HDLC RATIO: 2.8 (CALC)
CHOLESTEROL, TOTAL: 182 MG/DL
CREATININE: 0.82 MG/DL (ref 0.5–0.99)
EGFR IF AFRICN AM: 89 ML/MIN/1.73M2
EGFR IF NONAFRICN AM: 77 ML/MIN/1.73M2
GLUCOSE: 80 MG/DL (ref 65–99)
HDL CHOLESTEROL: 66 MG/DL
LDL-CHOLESTEROL: 87 MG/DL (CALC)
NON-HDL CHOLESTEROL: 116 MG/DL (CALC)
POTASSIUM: 3.9 MMOL/L (ref 3.4–4.8)
SODIUM: 140 MMOL/L (ref 135–146)
TRIGLYCERIDES: 192 MG/DL

## 2019-01-30 LAB — HPV I/H RISK 1 DNA SPEC QL NAA+PROBE: NEGATIVE

## 2019-01-31 ENCOUNTER — TELEPHONE (OUTPATIENT)
Dept: OTHER | Age: 64
End: 2019-01-31

## 2019-01-31 RX ORDER — AZITHROMYCIN 250 MG/1
TABLET, FILM COATED ORAL
Qty: 6 TABLET | Refills: 0 | Status: SHIPPED | OUTPATIENT
Start: 2019-01-31 | End: 2019-04-25 | Stop reason: ALTCHOICE

## 2019-01-31 NOTE — TELEPHONE ENCOUNTER
Patient advised of notes per Dr HERNANDEZ [facility], verbalized understanding and agreed with plan.

## 2019-01-31 NOTE — TELEPHONE ENCOUNTER
Pt states she had OV with Dr Salomón Granado 1/2/19 for upper respiratory symptoms.   Per pt cough and post nasal drip have not improved and per pt \"now when I cough the mucous is green in color\"    Pt states [place] and is asking

## 2019-02-07 RX ORDER — LOSARTAN POTASSIUM 100 MG/1
TABLET ORAL
Qty: 30 TABLET | Refills: 2 | OUTPATIENT
Start: 2019-02-07

## 2019-02-07 NOTE — TELEPHONE ENCOUNTER
[facility] medication changed to Losartan/HCTZ. Refill denied patient no longer taking this medication.

## 2019-02-27 ENCOUNTER — HOSPITAL ENCOUNTER (OUTPATIENT)
Dept: MAMMOGRAPHY | Age: 64
Discharge: HOME | End: 2019-02-27
Attending: FAMILY MEDICINE
Payer: COMMERCIAL

## 2019-02-27 DIAGNOSIS — Z12.31 ENCOUNTER FOR SCREENING MAMMOGRAM FOR BREAST CANCER: ICD-10-CM

## 2019-02-27 PROCEDURE — 77067 SCR MAMMO BI INCL CAD: CPT | Performed by: FAMILY MEDICINE

## 2019-02-27 PROCEDURE — 77063 BREAST TOMOSYNTHESIS BI: CPT | Performed by: FAMILY MEDICINE

## 2019-03-08 ENCOUNTER — NURSE ONLY (OUTPATIENT)
Dept: GASTROENTEROLOGY | Facility: CLINIC | Age: 64
End: 2019-03-08

## 2019-04-01 ENCOUNTER — APPOINTMENT (OUTPATIENT)
Dept: RADIATION ONCOLOGY | Facility: HOSPITAL | Age: 64
End: 2019-04-01
Attending: RADIOLOGY
Payer: COMMERCIAL

## 2019-04-01 RX ORDER — BUPROPION HYDROCHLORIDE 150 MG/1
150 TABLET ORAL
Qty: 90 TABLET | Refills: 0 | Status: SHIPPED | OUTPATIENT
Start: 2019-04-01 | End: 2019-09-30

## 2019-04-03 ENCOUNTER — HOSPITAL ENCOUNTER (OUTPATIENT)
Dept: MAMMOGRAPHY | Facility: HOSPITAL | Age: 64
Discharge: HOME | End: 2019-04-03
Attending: FAMILY MEDICINE
Payer: COMMERCIAL

## 2019-04-03 ENCOUNTER — HOSPITAL ENCOUNTER (OUTPATIENT)
Dept: ULTRASOUND IMAGING | Facility: HOSPITAL | Age: 64
Discharge: HOME | End: 2019-04-03
Attending: FAMILY MEDICINE
Payer: COMMERCIAL

## 2019-04-03 DIAGNOSIS — R92.8 ABNORMAL MAMMOGRAM: ICD-10-CM

## 2019-04-03 DIAGNOSIS — R92.8 ABNORMALITY OF RIGHT BREAST ON SCREENING MAMMOGRAM: Primary | ICD-10-CM

## 2019-04-03 PROCEDURE — 76642 ULTRASOUND BREAST LIMITED: CPT | Performed by: FAMILY MEDICINE

## 2019-04-03 PROCEDURE — 77065 DX MAMMO INCL CAD UNI: CPT | Performed by: FAMILY MEDICINE

## 2019-04-03 PROCEDURE — 77061 BREAST TOMOSYNTHESIS UNI: CPT | Performed by: FAMILY MEDICINE

## 2019-04-04 ENCOUNTER — TELEPHONE (OUTPATIENT)
Dept: FAMILY MEDICINE CLINIC | Facility: CLINIC | Age: 64
End: 2019-04-04

## 2019-04-04 DIAGNOSIS — N64.9 BREAST DISORDER: Primary | ICD-10-CM

## 2019-04-04 NOTE — TELEPHONE ENCOUNTER
rcvd a call from [facility] stating that in the comments for US Breast  to please state \" Right Breast Biopsy\"

## 2019-04-04 NOTE — IMAGING NOTE
This nurse introduced self and role of breast coordinator. Discussed recommended breast biopsy with patient. Pt was recommended by Dr Autumn Gonzales  to have a  Right breast ultrasound guided biopsy.    Pt history discussed as below annual exam works ft  cristin MOUTH FOUR TIMES DAILY AS NEEDED, Disp: 1 Inhaler, Rfl: 2  •  ZOLPIDEM TARTRATE ER 12.5 MG Oral Tab CR, TAKE 1 TABLET BY MOUTH AT BEDTIME AS NEEDED, Disp: 30 tablet, Rfl: 0  •  Desonide 0.05 % External Ointment, APPLY A THIN LAYER TWICE A DAY FOR 3 DAYS, D placed so this biopsy site is able to be accurately located upon future breast imaging. After the clip is placed, the RN will place a dressing on the biopsy site.   Additional mammography films will then be taken to assure correct placement of the placed m

## 2019-04-12 ENCOUNTER — HOSPITAL ENCOUNTER (OUTPATIENT)
Dept: MAMMOGRAPHY | Facility: HOSPITAL | Age: 64
Discharge: HOME | End: 2019-04-12
Attending: FAMILY MEDICINE
Payer: COMMERCIAL

## 2019-04-12 ENCOUNTER — HOSPITAL ENCOUNTER (OUTPATIENT)
Dept: ULTRASOUND IMAGING | Facility: HOSPITAL | Age: 64
Discharge: HOME | End: 2019-04-12
Attending: FAMILY MEDICINE
Payer: COMMERCIAL

## 2019-04-12 VITALS — HEART RATE: 78 BPM | RESPIRATION RATE: 16 BRPM | SYSTOLIC BLOOD PRESSURE: 124 MMHG | DIASTOLIC BLOOD PRESSURE: 62 MMHG

## 2019-04-12 DIAGNOSIS — N64.9 BREAST DISORDER: ICD-10-CM

## 2019-04-12 DIAGNOSIS — N63.10 BREAST MASS, RIGHT: ICD-10-CM

## 2019-04-12 PROCEDURE — 77065 DX MAMMO INCL CAD UNI: CPT | Performed by: FAMILY MEDICINE

## 2019-04-12 PROCEDURE — 88305 TISSUE EXAM BY PATHOLOGIST: CPT | Performed by: FAMILY MEDICINE

## 2019-04-12 PROCEDURE — 19083 BX BREAST 1ST LESION US IMAG: CPT | Performed by: FAMILY MEDICINE

## 2019-04-12 PROCEDURE — 88360 TUMOR IMMUNOHISTOCHEM/MANUAL: CPT | Performed by: FAMILY MEDICINE

## 2019-04-12 NOTE — PROCEDURES
[facility]  Procedure Note    Latoya Keller Patient Status:  Outpatient    1955 MRN [id number]   Location [address] [facility] Attending Renda Schwab, MD   Hosp Day # 0 PCP Keith Church.  Dalila Harrell MD     Procedure: Martin Holes

## 2019-04-12 NOTE — IMAGING NOTE
5 AM  hx take and as follows   Pt history discussed as below annual exam works ft    Spouse Mino  And has 2 children. She last took motrin  1 1/2 WEEKS AGO  days ago and will stop vit e last dose 5 DAYS AGO .  SHE IS GOING ON VACATION TO it.  5. Remove the steri-strip, or the butterfly dressing, five days after the biopsy or when the edges are loose. 6. You may have mild discomfort and may have a small amount of bruising where the needle entered the skin.    7. If you need medications for

## 2019-04-15 ENCOUNTER — TELEPHONE (OUTPATIENT)
Dept: HEMATOLOGY/ONCOLOGY | Facility: HOSPITAL | Age: 64
End: 2019-04-15

## 2019-04-15 NOTE — TELEPHONE ENCOUNTER
Phoned patient and message left asking that she please return call to Breast Oncology RN Navigator when able.

## 2019-04-16 ENCOUNTER — TELEPHONE (OUTPATIENT)
Dept: FAMILY MEDICINE CLINIC | Facility: CLINIC | Age: 64
End: 2019-04-16

## 2019-04-16 NOTE — TELEPHONE ENCOUNTER
Thanks Manuel Walker. I know they were at vacation home in [place] yesterday. May have been out of range. Please let me know if unable to reach by tomorrow.   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

## 2019-04-16 NOTE — TELEPHONE ENCOUNTER
Additional message left for patient to please return call to Breast Oncology RN Navigator when able.

## 2019-04-16 NOTE — TELEPHONE ENCOUNTER
Patient contacted. Discussed Dr. Suzy Ferrera training, experience. Pointed out advantage of being at [facility], being assured that the attending surgeon is actually doing the surgery. We did discuss options at [place] [id number].

## 2019-04-16 NOTE — TELEPHONE ENCOUNTER
Patient contacted. Discussed Dr. Jaimee Donahue training, experience. Pointed out advantage of being at [facility], being assured that the attending surgeon is actually doing the surgery. We did discuss options at [place] [id number].

## 2019-04-16 NOTE — TELEPHONE ENCOUNTER
Patient returned call. Introduced myself to patient as Breast Navigator. Shared with patient my role to provide her support and education, assist with care coordination, as well as connect her with resources should she need them.     Answered patient's qu

## 2019-04-17 ENCOUNTER — OFFICE VISIT (OUTPATIENT)
Dept: SURGERY | Facility: CLINIC | Age: 64
End: 2019-04-17
Payer: COMMERCIAL

## 2019-04-17 DIAGNOSIS — Z01.818 PREOP TESTING: Primary | ICD-10-CM

## 2019-04-17 PROCEDURE — 99245 OFF/OP CONSLTJ NEW/EST HI 55: CPT | Performed by: SURGERY

## 2019-04-17 NOTE — PATIENT INSTRUCTIONS
Surgery:  Wire localized, right partial mastectomy with sentinel lymph node biopsy    Date of Surgery:  May 6, 2019    Hosptial:  [address] [facility], [facility], [facility]   Phone: [phone]  · This is an outpatient procedure.   · U Daphnie Mars M.D.   [facility]    Dr. Anthonette Angelucci nurse line:  [phone]  Monday through Friday 8:00am to 4:30pm.     For Dr. Anthonette Angelucci office: [phone]/ Fax: [phone]  After

## 2019-04-18 ENCOUNTER — TELEPHONE (OUTPATIENT)
Dept: HEMATOLOGY/ONCOLOGY | Facility: HOSPITAL | Age: 64
End: 2019-04-18

## 2019-04-18 DIAGNOSIS — N60.99 ATYPICAL LOBULAR HYPERPLASIA OF BREAST: Primary | ICD-10-CM

## 2019-04-18 NOTE — CONSULTS
[facility]    Patient Name:  Jewels Cuba   YOB: 1955   Gender:  Female   Appt Date:  4/17/2019   Provider:  Myrna Whitehead MD   Insurance:  [address] [facility]  Referring Provider: No ref.  provider found   Ad This is a very pleasant and rather healthy 43-year-old female who is being evaluated today for the above reason. History of this present illness dates back to 2/27/2019 when the patient underwent screening mammograms with tomosynthesis.   Those returned to •  SERTRALINE HCL 50 MG Oral Tab, TAKE 1 TABLET BY MOUTH EVERY DAY, Disp: 30 tablet, Rfl: 5  •  Montelukast Sodium 10 MG Oral Tab, Take 1 tablet (10 mg total) by mouth nightly.  AT BEDTIME, Disp: 90 tablet, Rfl: 2  •  FLUTICASONE PROPIONATE 50 MCG/ACT Nasal Rush   • COLONOSCOPY  2008    asymptomatic diverticulosis on colonoscopy   • ELECTROCARDIOGRAM, COMPLETE  4/12/2013    scanned to media tab   • BOLA NEEDLE LOCALIZATION W/ SPECIMEN 1 SITE RIGHT     • SKIN SURGERY  2005    scalp cyst ex x 3   • SKIN SURGERY Psychiatric/Behavioral: Negative for confusion and agitation. Physical Examination:  Physical Exam    Constitutional: She is oriented to person, place, and time. She appears well-developed and well-nourished. HENT:   Head: Normocephalic.    Eyes: Pu INDICATIONS:  Other abnormal and inconclusive findings on diagnostic imaging of breast  The patient was recalled from screening mammography for further evaluation of in asymmetry in the right inner mid breast. The patient has a history of a right excisiona BI-RADS CATEGORY:     DIAGNOSTIC CATEGORY 4--SUSPICIOUS    4B:  Moderate suspicion for malignancy     Case Report   Surgical Pathology                                Case: [id number]                                   Authorizing Provider: Aquiles Messer The tissue that has been submitted for tumor markers by manual quantitative and semi quantitative analysis was fixed in 10% neutral buffered formalin, following the recommended CAP guidelines time fixation (6-72 hours).   All antibodies are validated to doc This is a 56-year-old female of [address] descent who is presenting with a recently diagnosed invasive ductal carcinoma of the right breast.  Her clinical stage is T1 N0 M0, G1, ER +, NC +, HER-2 -, clinical prognostic stage group 1a per the eighth

## 2019-04-18 NOTE — H&P (VIEW-ONLY)
[facility]    Patient Name:  Yefri Pena   YOB: 1955   Gender:  Female   Appt Date:  4/17/2019   Provider:  Kalpana Preciado MD   Insurance:  [address] [facility]  Referring Provider: No ref.  provider found   Ad This is a very pleasant and rather healthy 62-year-old female who is being evaluated today for the above reason. History of this present illness dates back to 2/27/2019 when the patient underwent screening mammograms with tomosynthesis.   Those returned to •  SERTRALINE HCL 50 MG Oral Tab, TAKE 1 TABLET BY MOUTH EVERY DAY, Disp: 30 tablet, Rfl: 5  •  Montelukast Sodium 10 MG Oral Tab, Take 1 tablet (10 mg total) by mouth nightly.  AT BEDTIME, Disp: 90 tablet, Rfl: 2  •  FLUTICASONE PROPIONATE 50 MCG/ACT Nasal Rush   • COLONOSCOPY  2008    asymptomatic diverticulosis on colonoscopy   • ELECTROCARDIOGRAM, COMPLETE  4/12/2013    scanned to media tab   • BOLA NEEDLE LOCALIZATION W/ SPECIMEN 1 SITE RIGHT     • SKIN SURGERY  2005    scalp cyst ex x 3   • SKIN SURGERY Psychiatric/Behavioral: Negative for confusion and agitation. Physical Examination:  Physical Exam    Constitutional: She is oriented to person, place, and time. She appears well-developed and well-nourished. HENT:   Head: Normocephalic.    Eyes: Pu INDICATIONS:  Other abnormal and inconclusive findings on diagnostic imaging of breast  The patient was recalled from screening mammography for further evaluation of in asymmetry in the right inner mid breast. The patient has a history of a right excisiona BI-RADS CATEGORY:     DIAGNOSTIC CATEGORY 4--SUSPICIOUS    4B:  Moderate suspicion for malignancy     Case Report   Surgical Pathology                                Case: [id number]                                   Authorizing Provider: Diaz Estes The tissue that has been submitted for tumor markers by manual quantitative and semi quantitative analysis was fixed in 10% neutral buffered formalin, following the recommended CAP guidelines time fixation (6-72 hours).   All antibodies are validated to doc This is a 32-year-old female of [address] [facility] descent who is presenting with a recently diagnosed invasive ductal carcinoma of the right breast.  Her clinical stage is T1 N0 M0, G1, ER +, MT +, HER-2 -, clinical prognostic stage group 1a per the eighth

## 2019-04-18 NOTE — TELEPHONE ENCOUNTER
Phoned patient to follow up with her after her surgical consultation with Dr. Ruthie Tang. Patient shares that her appointment went very well. Patient shares discussed surgery for May 6th.   Patient with questions concerning meeting with Medical and Radiatio

## 2019-04-18 NOTE — TELEPHONE ENCOUNTER
Phoned patient to discuss scheduling appointment with genetic counselor. Appointment scheduled with Arias Abbott for this Monday 4/22/19 at [address] [place] [address].  All appointment information provided to patient. She acknowledged and denied questions.

## 2019-04-19 ENCOUNTER — DOCUMENTATION ONLY (OUTPATIENT)
Dept: SURGERY | Facility: CLINIC | Age: 64
End: 2019-04-19

## 2019-04-19 DIAGNOSIS — N60.99 ATYPICAL LOBULAR HYPERPLASIA OF BREAST: Primary | ICD-10-CM

## 2019-04-22 ENCOUNTER — GENETICS ENCOUNTER (OUTPATIENT)
Dept: GENETICS | Facility: HOSPITAL | Age: 64
End: 2019-04-22
Attending: GENETIC COUNSELOR, MS
Payer: COMMERCIAL

## 2019-04-22 ENCOUNTER — NURSE ONLY (OUTPATIENT)
Dept: HEMATOLOGY/ONCOLOGY | Facility: HOSPITAL | Age: 64
End: 2019-04-22
Attending: GENETIC COUNSELOR, MS
Payer: COMMERCIAL

## 2019-04-22 DIAGNOSIS — Z13.79 ASHKENAZI JEWISH ANCESTRY REQUIRING POPULATION-SPECIFIC SCREENING FOR GENETIC DISORDER: ICD-10-CM

## 2019-04-22 DIAGNOSIS — Z17.0 MALIGNANT NEOPLASM OF RIGHT BREAST IN FEMALE, ESTROGEN RECEPTOR POSITIVE (HCC): Primary | ICD-10-CM

## 2019-04-22 DIAGNOSIS — C50.911 MALIGNANT NEOPLASM OF RIGHT BREAST IN FEMALE, ESTROGEN RECEPTOR POSITIVE (HCC): Primary | ICD-10-CM

## 2019-04-22 PROCEDURE — 96040 MEDICAL GENETICS COUNSELING EA 30 MIN: CPT | Performed by: GENETIC COUNSELOR, MS

## 2019-04-22 PROCEDURE — 36415 COLL VENOUS BLD VENIPUNCTURE: CPT

## 2019-04-22 NOTE — PROGRESS NOTES
Reason for visit: Ms. Geraldine Zhang is a 59-year-old woman referred to genetic counseling due to her recent diagnosis of breast cancer. She was seen today to discuss the option of genetic testing and how this may help with her management and surgical options. away in her [place] (she was not certain as to the type of cancer) but is unaware of any other family members on her maternal side of the family with malignancies.   On her paternal side of the family, she explains that her father’s only sibling, a brother, pa to both male and female offspring. The pros and cons of cancer predisposing gene mutation testing were reviewed with Ms. Lee Meza and her daughter.   Genetic test results can have significant impact on medical management, planning, screening, surgical decis the above history and our discussion of genetic testing options, Ms. Geraldine Zhang decided to proceed with a hereditary cancer panel through [facility] laboratory, which encompasses 9 genes associated with high risk of breast cancers which is available on a STAT ba

## 2019-04-23 ENCOUNTER — TELEPHONE (OUTPATIENT)
Dept: HEMATOLOGY/ONCOLOGY | Facility: HOSPITAL | Age: 64
End: 2019-04-23

## 2019-04-23 NOTE — TELEPHONE ENCOUNTER
Call received from patient. She wishes to meet with the Radiation Oncologist prior to her scheduled surgery on 5/6/19. Patient shared concerns related to skin related side effects of radiation therapy that she would like to discuss.

## 2019-04-25 ENCOUNTER — APPOINTMENT (OUTPATIENT)
Dept: LAB | Facility: HOSPITAL | Age: 64
End: 2019-04-25
Attending: FAMILY MEDICINE
Payer: COMMERCIAL

## 2019-04-25 ENCOUNTER — OFFICE VISIT (OUTPATIENT)
Dept: FAMILY MEDICINE CLINIC | Facility: CLINIC | Age: 64
End: 2019-04-25
Payer: COMMERCIAL

## 2019-04-25 VITALS
HEIGHT: 61.75 IN | WEIGHT: 133.63 LBS | DIASTOLIC BLOOD PRESSURE: 80 MMHG | BODY MASS INDEX: 24.59 KG/M2 | RESPIRATION RATE: 18 BRPM | SYSTOLIC BLOOD PRESSURE: 128 MMHG | TEMPERATURE: 98 F | HEART RATE: 68 BPM

## 2019-04-25 DIAGNOSIS — Z17.0 MALIGNANT NEOPLASM OF RIGHT BREAST IN FEMALE, ESTROGEN RECEPTOR POSITIVE, UNSPECIFIED SITE OF BREAST (HCC): ICD-10-CM

## 2019-04-25 DIAGNOSIS — J45.40 MODERATE PERSISTENT ASTHMA WITHOUT COMPLICATION: ICD-10-CM

## 2019-04-25 DIAGNOSIS — Z01.818 PREOPERATIVE EXAMINATION: Primary | ICD-10-CM

## 2019-04-25 DIAGNOSIS — F41.9 ANXIETY: ICD-10-CM

## 2019-04-25 DIAGNOSIS — I10 ESSENTIAL HYPERTENSION: ICD-10-CM

## 2019-04-25 DIAGNOSIS — C50.911 MALIGNANT NEOPLASM OF RIGHT BREAST IN FEMALE, ESTROGEN RECEPTOR POSITIVE, UNSPECIFIED SITE OF BREAST (HCC): ICD-10-CM

## 2019-04-25 DIAGNOSIS — L98.9 SKIN LESION OF FACE: ICD-10-CM

## 2019-04-25 PROCEDURE — 93000 ELECTROCARDIOGRAM COMPLETE: CPT | Performed by: FAMILY MEDICINE

## 2019-04-25 PROCEDURE — 93005 ELECTROCARDIOGRAM TRACING: CPT | Performed by: FAMILY MEDICINE

## 2019-04-25 PROCEDURE — 99212 OFFICE O/P EST SF 10 MIN: CPT | Performed by: FAMILY MEDICINE

## 2019-04-25 PROCEDURE — 99214 OFFICE O/P EST MOD 30 MIN: CPT | Performed by: FAMILY MEDICINE

## 2019-04-25 PROCEDURE — 36415 COLL VENOUS BLD VENIPUNCTURE: CPT

## 2019-04-25 RX ORDER — TACROLIMUS 0.3 MG/G
OINTMENT TOPICAL
Refills: 0 | COMMUNITY
Start: 2019-03-11

## 2019-04-25 NOTE — PROGRESS NOTES
HPI:    Patient ID: Bia Londono is a 59year old female. Preoperative evaluation for patient scheduled for mastectomy right breast with sentinel node biopsy with Dr. Christina Kelly at [facility] on 5/6/2019.       Review of Systems   Constitutiona Comment:Other reaction(s): anaphlaxsis   PHYSICAL EXAM:   Physical Exam   Constitutional: She is oriented to person, place, and time. She appears well-developed and well-nourished.    Cardiovascular: Normal rate, regular rhythm, normal heart sounds an

## 2019-04-27 RX ORDER — METOCLOPRAMIDE 10 MG/1
10 TABLET ORAL ONCE
Status: DISCONTINUED | OUTPATIENT
Start: 2019-04-27 | End: 2019-04-27

## 2019-04-29 ENCOUNTER — TELEPHONE (OUTPATIENT)
Dept: GENETICS | Facility: HOSPITAL | Age: 64
End: 2019-04-29

## 2019-04-29 RX ORDER — LOSARTAN POTASSIUM 100 MG/1
TABLET ORAL
Qty: 30 TABLET | Refills: 2 | OUTPATIENT
Start: 2019-04-29

## 2019-04-29 NOTE — TELEPHONE ENCOUNTER
Reported negative genetic testing results for 9 genes to Anisha Crowe. She opted for [facility] Breast Cancer STAT panel with CHEK2 and VALERIO ([facility]) and negative results were obtained for all 9 genes.  She declines to do a re-requisition at this time and reminded

## 2019-04-30 RX ORDER — FLUTICASONE PROPIONATE 50 MCG
SPRAY, SUSPENSION (ML) NASAL
Refills: 8 | OUTPATIENT
Start: 2019-04-30

## 2019-05-01 ENCOUNTER — OFFICE VISIT (OUTPATIENT)
Dept: RADIATION ONCOLOGY | Facility: HOSPITAL | Age: 64
End: 2019-05-01
Attending: RADIOLOGY
Payer: COMMERCIAL

## 2019-05-01 VITALS
WEIGHT: 135.63 LBS | RESPIRATION RATE: 16 BRPM | HEIGHT: 62 IN | DIASTOLIC BLOOD PRESSURE: 72 MMHG | TEMPERATURE: 98 F | SYSTOLIC BLOOD PRESSURE: 131 MMHG | BODY MASS INDEX: 24.96 KG/M2 | HEART RATE: 68 BPM

## 2019-05-01 PROCEDURE — 99212 OFFICE O/P EST SF 10 MIN: CPT

## 2019-05-01 NOTE — PROGRESS NOTES
Nursing Consultation Note  Patient: Deja Flores  YOB: 1955  Age: 59year old  Radiation Oncologist: Dr. Estuardo Falcon  Referring Physician: Sarah Salinas  Diagnosis:No diagnosis found.   Consult Date: 5/1/2019      Chemotherapy: No  Labs: differently: Take 150 mg by mouth every evening.  ) Disp: 90 tablet Rfl: 0   Losartan Potassium-HCTZ (HYZAAR) 100-12.5 MG Oral Tab Take 1 tablet by mouth daily.  (Patient taking differently: Take 1 tablet by mouth daily as needed.  ) Disp: 90 tablet Rfl: 3 Lila Acuña [phone], Eliot Chaudhry  [address] [address]  Phone: [phone] Fax: [phone]      Past Medical History:   Diagnosis Date   • Anxiety state    • [employer] ancestry requiring population-specific genetic screening Never      Sexual activity: Not on file    Lifestyle      Physical activity:        Days per week: Not on file        Minutes per session: Not on file      Stress: Not on file    Relationships      Social connections:        Talks on phone: Not on file reactions from radiation, especially patients with diagnosis of eczema. Discussed the radiation process and potential side effects of fatigue and skin changes. Provided pt with basic skin care instructions to be followed once radiation begins.  Will plan fo patient's capability  Encourage adequate nutrition and hydration    Expected Outcomes:  Patient exhibits fatigue management strategies    Progress Toward Outcome:  Making progress    Pamphlets/Handouts Given to Patient:  Understanding radiation therapy

## 2019-05-01 NOTE — CONSULTS
RADIATION ONCOLOGY NOTE    DATE OF VISIT: 5/1/2019    DIAGNOSIS :  Stage IA cT1a N M0, IDC of right breast, receptor positive, G1, negative genetics, for definitive surgery and SLB shortly.     Dear Yeny Grajeda  and colleagues,    Thank you very mu Disp: 90 tablet Rfl: 0   Losartan Potassium-HCTZ (HYZAAR) 100-12.5 MG Oral Tab Take 1 tablet by mouth daily.  (Patient taking differently: Take 1 tablet by mouth daily as needed.  ) Disp: 90 tablet Rfl: 3   SERTRALINE HCL 50 MG Oral Tab TAKE 1 TABLET BY FRANSISCO unspecified, First degree AV block (2013), High blood pressure, Migraines, Osteoarthritis, and Sinusitis.  She also has no past medical history of Anesthesia complication, Diabetes ([facility] [address] Difficult intubation, Malignant hyperthermia, PONV (postoperative carter radiographic, pathologic and laboratory studies. ASSESSMENT/PLAN    58 yo with Stage IA cT1a N M0, IDC of right breast, receptor positive, G1, negative genetics, for definitive surgery and SLB shortly.     Ideally the pt will have negative LN with negati inner mid breast. The patient has a history of a right excisional breast biopsy. She has no family history of breast cancer. She has no new breast   related complaints. TECHNIQUE:    Digital diagnostic mammography of the right breast was performed. PLEASE NOTE: NORMAL MAMMOGRAM DOES NOT EXCLUDE THE POSSIBILITY OF BREAST CANCER. A CLINICALLY SUSPICIOUS PALPABLE LUMP SHOULD BE BIOPSIED.        For patients over the age of 36, the target due date for the patient's next mammogram has been entered into Pathologist:           Jessy Dao MD                                                          Specimen:    Breast, right, 2:00, 3 cm from nipple, cores (x5) Addendum electronically signed by Rosy Delaney MD on 4/15/2019 at 0641   Final Diagnosis:      Right breast (2:00, 3 cm from nipple); core biopsy:   · Infiltrating ductal carcinoma, nuclear grade 1 (see comment). · Microcalcifications present.

## 2019-05-02 ENCOUNTER — TELEPHONE (OUTPATIENT)
Dept: HEMATOLOGY/ONCOLOGY | Facility: HOSPITAL | Age: 64
End: 2019-05-02

## 2019-05-02 NOTE — TELEPHONE ENCOUNTER
Refill passed per [facility] protocol.   Refill Protocol Appointment Criteria  · Appointment scheduled in the past 6 months or in the next 3 months  Recent Outpatient Visits            [address], Wes Arana

## 2019-05-04 RX ORDER — LOSARTAN POTASSIUM 100 MG/1
TABLET ORAL
Qty: 90 TABLET | Refills: 1 | OUTPATIENT
Start: 2019-05-04

## 2019-05-05 NOTE — TELEPHONE ENCOUNTER
Refill passed per [facility], [facility] protocol.     Losartan d/c - alternate therapy    Refill Protocol Appointment Criteria  · Appointment scheduled in the past 6 months or in the next 3 months  Recent Outpatient Visits            3 days ago     Hilario Castro

## 2019-05-06 ENCOUNTER — ANESTHESIA EVENT (OUTPATIENT)
Dept: SURGERY | Facility: HOSPITAL | Age: 64
End: 2019-05-06
Payer: COMMERCIAL

## 2019-05-06 ENCOUNTER — HOSPITAL ENCOUNTER (OUTPATIENT)
Dept: MAMMOGRAPHY | Facility: HOSPITAL | Age: 64
Discharge: HOME | End: 2019-05-06
Attending: SURGERY
Payer: COMMERCIAL

## 2019-05-06 ENCOUNTER — TELEPHONE (OUTPATIENT)
Dept: GENERAL RADIOLOGY | Facility: HOSPITAL | Age: 64
End: 2019-05-06

## 2019-05-06 ENCOUNTER — APPOINTMENT (OUTPATIENT)
Dept: MAMMOGRAPHY | Facility: HOSPITAL | Age: 64
End: 2019-05-06
Attending: SURGERY
Payer: COMMERCIAL

## 2019-05-06 ENCOUNTER — ANESTHESIA (OUTPATIENT)
Dept: SURGERY | Facility: HOSPITAL | Age: 64
End: 2019-05-06
Payer: COMMERCIAL

## 2019-05-06 ENCOUNTER — HOSPITAL ENCOUNTER (OUTPATIENT)
Facility: HOSPITAL | Age: 64
Setting detail: HOSPITAL OUTPATIENT SURGERY
Discharge: HOME | End: 2019-05-06
Attending: SURGERY | Admitting: SURGERY
Payer: COMMERCIAL

## 2019-05-06 ENCOUNTER — HOSPITAL ENCOUNTER (OUTPATIENT)
Dept: NUCLEAR MEDICINE | Facility: HOSPITAL | Age: 64
Discharge: HOME | End: 2019-05-06
Attending: SURGERY
Payer: COMMERCIAL

## 2019-05-06 VITALS
BODY MASS INDEX: 24.66 KG/M2 | HEART RATE: 73 BPM | RESPIRATION RATE: 15 BRPM | WEIGHT: 134 LBS | DIASTOLIC BLOOD PRESSURE: 63 MMHG | OXYGEN SATURATION: 95 % | TEMPERATURE: 98 F | HEIGHT: 62 IN | SYSTOLIC BLOOD PRESSURE: 127 MMHG

## 2019-05-06 DIAGNOSIS — N60.99 ATYPICAL LOBULAR HYPERPLASIA OF BREAST: ICD-10-CM

## 2019-05-06 DIAGNOSIS — Z17.0 MALIGNANT NEOPLASM OF CENTRAL PORTION OF RIGHT BREAST IN FEMALE, ESTROGEN RECEPTOR POSITIVE (HCC): Primary | ICD-10-CM

## 2019-05-06 DIAGNOSIS — C50.111 MALIGNANT NEOPLASM OF CENTRAL PORTION OF RIGHT BREAST IN FEMALE, ESTROGEN RECEPTOR POSITIVE (HCC): Primary | ICD-10-CM

## 2019-05-06 DIAGNOSIS — C50.911 INFILTRATING DUCTAL CARCINOMA OF RIGHT BREAST (HCC): Primary | ICD-10-CM

## 2019-05-06 PROCEDURE — 78195 LYMPH SYSTEM IMAGING: CPT | Performed by: SURGERY

## 2019-05-06 PROCEDURE — 19281 PERQ DEVICE BREAST 1ST IMAG: CPT | Performed by: SURGERY

## 2019-05-06 PROCEDURE — 88342 IMHCHEM/IMCYTCHM 1ST ANTB: CPT | Performed by: SURGERY

## 2019-05-06 PROCEDURE — 88307 TISSUE EXAM BY PATHOLOGIST: CPT | Performed by: SURGERY

## 2019-05-06 PROCEDURE — 88305 TISSUE EXAM BY PATHOLOGIST: CPT | Performed by: SURGERY

## 2019-05-06 PROCEDURE — 76098 X-RAY EXAM SURGICAL SPECIMEN: CPT | Performed by: SURGERY

## 2019-05-06 PROCEDURE — 88309 TISSUE EXAM BY PATHOLOGIST: CPT | Performed by: SURGERY

## 2019-05-06 PROCEDURE — 0HBT0ZZ EXCISION OF RIGHT BREAST, OPEN APPROACH: ICD-10-PCS | Performed by: SURGERY

## 2019-05-06 PROCEDURE — 07B50ZX EXCISION OF RIGHT AXILLARY LYMPHATIC, OPEN APPROACH, DIAGNOSTIC: ICD-10-PCS | Performed by: SURGERY

## 2019-05-06 PROCEDURE — 88360 TUMOR IMMUNOHISTOCHEM/MANUAL: CPT | Performed by: SURGERY

## 2019-05-06 RX ORDER — ALBUTEROL SULFATE 90 UG/1
1 AEROSOL, METERED RESPIRATORY (INHALATION) 4 TIMES DAILY
Status: DISCONTINUED | OUTPATIENT
Start: 2019-05-06 | End: 2019-05-06

## 2019-05-06 RX ORDER — HYDROMORPHONE HYDROCHLORIDE 1 MG/ML
0.6 INJECTION, SOLUTION INTRAMUSCULAR; INTRAVENOUS; SUBCUTANEOUS EVERY 5 MIN PRN
Status: DISCONTINUED | OUTPATIENT
Start: 2019-05-06 | End: 2019-05-06

## 2019-05-06 RX ORDER — DEXAMETHASONE SODIUM PHOSPHATE 4 MG/ML
VIAL (ML) INJECTION AS NEEDED
Status: DISCONTINUED | OUTPATIENT
Start: 2019-05-06 | End: 2019-05-06 | Stop reason: SURG

## 2019-05-06 RX ORDER — SODIUM CHLORIDE, SODIUM LACTATE, POTASSIUM CHLORIDE, CALCIUM CHLORIDE 600; 310; 30; 20 MG/100ML; MG/100ML; MG/100ML; MG/100ML
INJECTION, SOLUTION INTRAVENOUS CONTINUOUS
Status: DISCONTINUED | OUTPATIENT
Start: 2019-05-06 | End: 2019-05-06

## 2019-05-06 RX ORDER — EPHEDRINE SULFATE 50 MG/ML
INJECTION, SOLUTION INTRAVENOUS AS NEEDED
Status: DISCONTINUED | OUTPATIENT
Start: 2019-05-06 | End: 2019-05-06 | Stop reason: SURG

## 2019-05-06 RX ORDER — FAMOTIDINE 20 MG/1
20 TABLET ORAL ONCE
Status: DISCONTINUED | OUTPATIENT
Start: 2019-05-06 | End: 2019-05-06 | Stop reason: HOSPADM

## 2019-05-06 RX ORDER — HYDROMORPHONE HYDROCHLORIDE 1 MG/ML
0.2 INJECTION, SOLUTION INTRAMUSCULAR; INTRAVENOUS; SUBCUTANEOUS EVERY 5 MIN PRN
Status: DISCONTINUED | OUTPATIENT
Start: 2019-05-06 | End: 2019-05-06

## 2019-05-06 RX ORDER — HYDROCODONE BITARTRATE AND ACETAMINOPHEN 5; 325 MG/1; MG/1
1-2 TABLET ORAL EVERY 4 HOURS PRN
Qty: 20 TABLET | Refills: 0 | Status: SHIPPED | OUTPATIENT
Start: 2019-05-06 | End: 2019-05-14 | Stop reason: ALTCHOICE

## 2019-05-06 RX ORDER — MORPHINE SULFATE 10 MG/ML
6 INJECTION, SOLUTION INTRAMUSCULAR; INTRAVENOUS EVERY 10 MIN PRN
Status: DISCONTINUED | OUTPATIENT
Start: 2019-05-06 | End: 2019-05-06

## 2019-05-06 RX ORDER — ACETAMINOPHEN 500 MG
1000 TABLET ORAL ONCE
Status: COMPLETED | OUTPATIENT
Start: 2019-05-06 | End: 2019-05-06

## 2019-05-06 RX ORDER — ONDANSETRON 2 MG/ML
4 INJECTION INTRAMUSCULAR; INTRAVENOUS ONCE AS NEEDED
Status: DISCONTINUED | OUTPATIENT
Start: 2019-05-06 | End: 2019-05-06

## 2019-05-06 RX ORDER — CEFAZOLIN SODIUM/WATER 2 G/20 ML
2 SYRINGE (ML) INTRAVENOUS ONCE
Status: COMPLETED | OUTPATIENT
Start: 2019-05-06 | End: 2019-05-06

## 2019-05-06 RX ORDER — HYDROMORPHONE HYDROCHLORIDE 1 MG/ML
0.4 INJECTION, SOLUTION INTRAMUSCULAR; INTRAVENOUS; SUBCUTANEOUS EVERY 5 MIN PRN
Status: DISCONTINUED | OUTPATIENT
Start: 2019-05-06 | End: 2019-05-06

## 2019-05-06 RX ORDER — MORPHINE SULFATE 4 MG/ML
4 INJECTION, SOLUTION INTRAMUSCULAR; INTRAVENOUS EVERY 10 MIN PRN
Status: DISCONTINUED | OUTPATIENT
Start: 2019-05-06 | End: 2019-05-06

## 2019-05-06 RX ORDER — LIDOCAINE HYDROCHLORIDE 10 MG/ML
INJECTION, SOLUTION EPIDURAL; INFILTRATION; INTRACAUDAL; PERINEURAL AS NEEDED
Status: DISCONTINUED | OUTPATIENT
Start: 2019-05-06 | End: 2019-05-06 | Stop reason: SURG

## 2019-05-06 RX ORDER — ONDANSETRON 2 MG/ML
INJECTION INTRAMUSCULAR; INTRAVENOUS AS NEEDED
Status: DISCONTINUED | OUTPATIENT
Start: 2019-05-06 | End: 2019-05-06 | Stop reason: SURG

## 2019-05-06 RX ORDER — HYDROCODONE BITARTRATE AND ACETAMINOPHEN 5; 325 MG/1; MG/1
1 TABLET ORAL AS NEEDED
Status: DISCONTINUED | OUTPATIENT
Start: 2019-05-06 | End: 2019-05-06

## 2019-05-06 RX ORDER — ONDANSETRON 4 MG/1
4 TABLET, ORALLY DISINTEGRATING ORAL EVERY 4 HOURS PRN
Qty: 20 TABLET | Refills: 0 | Status: SHIPPED | OUTPATIENT
Start: 2019-05-06 | End: 2019-08-20

## 2019-05-06 RX ORDER — HYDROCODONE BITARTRATE AND ACETAMINOPHEN 5; 325 MG/1; MG/1
2 TABLET ORAL AS NEEDED
Status: DISCONTINUED | OUTPATIENT
Start: 2019-05-06 | End: 2019-05-06

## 2019-05-06 RX ORDER — HEPARIN SODIUM 5000 [USP'U]/ML
5000 INJECTION, SOLUTION INTRAVENOUS; SUBCUTANEOUS ONCE
Status: COMPLETED | OUTPATIENT
Start: 2019-05-06 | End: 2019-05-06

## 2019-05-06 RX ORDER — HALOPERIDOL 5 MG/ML
0.25 INJECTION INTRAMUSCULAR ONCE AS NEEDED
Status: DISCONTINUED | OUTPATIENT
Start: 2019-05-06 | End: 2019-05-06

## 2019-05-06 RX ORDER — NALOXONE HYDROCHLORIDE 0.4 MG/ML
80 INJECTION, SOLUTION INTRAMUSCULAR; INTRAVENOUS; SUBCUTANEOUS AS NEEDED
Status: DISCONTINUED | OUTPATIENT
Start: 2019-05-06 | End: 2019-05-06

## 2019-05-06 RX ORDER — MORPHINE SULFATE 2 MG/ML
2 INJECTION, SOLUTION INTRAMUSCULAR; INTRAVENOUS EVERY 10 MIN PRN
Status: DISCONTINUED | OUTPATIENT
Start: 2019-05-06 | End: 2019-05-06

## 2019-05-06 RX ADMIN — LIDOCAINE HYDROCHLORIDE 50 MG: 10 INJECTION, SOLUTION EPIDURAL; INFILTRATION; INTRACAUDAL; PERINEURAL at 10:13:00

## 2019-05-06 RX ADMIN — EPHEDRINE SULFATE 10 MG: 50 INJECTION, SOLUTION INTRAVENOUS at 10:19:00

## 2019-05-06 RX ADMIN — DEXAMETHASONE SODIUM PHOSPHATE 4 MG: 4 MG/ML VIAL (ML) INJECTION at 10:13:00

## 2019-05-06 RX ADMIN — SODIUM CHLORIDE, SODIUM LACTATE, POTASSIUM CHLORIDE, CALCIUM CHLORIDE: 600; 310; 30; 20 INJECTION, SOLUTION INTRAVENOUS at 12:16:00

## 2019-05-06 RX ADMIN — SODIUM CHLORIDE, SODIUM LACTATE, POTASSIUM CHLORIDE, CALCIUM CHLORIDE: 600; 310; 30; 20 INJECTION, SOLUTION INTRAVENOUS at 10:08:00

## 2019-05-06 RX ADMIN — EPHEDRINE SULFATE 10 MG: 50 INJECTION, SOLUTION INTRAVENOUS at 10:24:00

## 2019-05-06 RX ADMIN — CEFAZOLIN SODIUM/WATER 2 G: 2 G/20 ML SYRINGE (ML) INTRAVENOUS at 10:20:00

## 2019-05-06 RX ADMIN — EPHEDRINE SULFATE 10 MG: 50 INJECTION, SOLUTION INTRAVENOUS at 10:39:00

## 2019-05-06 RX ADMIN — ONDANSETRON 4 MG: 2 INJECTION INTRAMUSCULAR; INTRAVENOUS at 10:13:00

## 2019-05-06 NOTE — ANESTHESIA POSTPROCEDURE EVALUATION
Patient: Pamula Alpers    Procedure Summary     Date:  05/06/19 Room / Location:  [address] [place] [address] OR 08 / [address] [facility] OR    Anesthesia Start:  4578 Anesthesia Stop:  8771    Procedures:       PARTIAL MASTECTOMY W/ SENTINEL LYMPH NODE BIOPSY (Right )      BREAST

## 2019-05-06 NOTE — ANESTHESIA PREPROCEDURE EVALUATION
Anesthesia PreOp Note    HPI:     Vanessa Mckenzie is a 59year old female who presents for preoperative consultation requested by: Julissa Sutton MD    Date of Surgery: 5/6/2019    Procedure(s):  PARTIAL MASTECTOMY W/ SENTINEL LYMPH NODE BIOPSY  BREAST • Sinusitis        Past Surgical History:   Procedure Laterality Date   • BONE DENSITY TEST SCAN  2012    5/10/2012 normal bone mineral density   • BREAST BIOPSY Right 2012    [place]   • COLONOSCOPY  2008    asymptomatic diverticulosis on colonoscopy   • ALEXSANDRA ZOLPIDEM TARTRATE ER 12.5 MG Oral Tab CR TAKE 1 TABLET BY MOUTH AT BEDTIME AS NEEDED Disp: 30 tablet Rfl: 0 5/5/2019 at 2200   Desonide 0.05 % External Ointment APPLY A THIN LAYER TWICE A DAY FOR 3 DAYS Disp:  Rfl: 0 Past Week at Unknown time   MENDY Smokeless tobacco: Never Used      Tobacco comment: No household smokers.      Substance and Sexual Activity      Alcohol use: Yes        Comment: 4 drinks weekly      Drug use: Never      Sexual activity: Not on file    Lifestyle      Physical activity Body mass index is 24.51 kg/m². height is 1.575 m (5' 2\") and weight is 60.8 kg (134 lb). Her oral temperature is 98.3 °F (36.8 °C). Her blood pressure is 118/63 and her pulse is 76. Her respiration is 18 and oxygen saturation is 100%.     04/27/19  1101

## 2019-05-06 NOTE — BRIEF OP NOTE
Pre-Operative Diagnosis: Atypical lobular hyperplasia of breast [N60.99]     Post-Operative Diagnosis: Atypical lobular hyperplasia of breast [N60.99]      Procedure Performed:   Procedure(s):  right breast wire localization, right breast partial mastectom

## 2019-05-06 NOTE — ANESTHESIA PROCEDURE NOTES
Airway  Date/Time: 5/6/2019 10:20 AM  Urgency: elective    Airway not difficult    General Information and Staff    Patient location during procedure: OR  Anesthesiologist: Odalis Valles MD  Resident/CRNA: Avis Bailey CRNA  Performed: CRNA     Indicatio

## 2019-05-06 NOTE — PROCEDURES
[facility]  Procedure Note    David Orlando Patient Status:  Outpatient    1955 MRN [id number]   Location [address] [facility] [facility] Attending Severino Gross MD   [facility] Day # 0 [facility] Min Jauregui.  Donnie Roa MD     Procedure: m

## 2019-05-06 NOTE — INTERVAL H&P NOTE
Patient seen and examined. No changes to the attached history and physical are noted. 59year old female presenting today for planned left partial mastectomy, SLNB.     Satya Hwang MD  Complex General Surgical Oncology  [facility] 112  Pager

## 2019-05-06 NOTE — TELEPHONE ENCOUNTER
This rn contacted Dr Troy Mina Mis . Informed that a new order with different diagnosis code needed to be entered. Janene Zepeda rn to enter a new order .

## 2019-05-06 NOTE — IMAGING NOTE
Pt  To ultrasound /mammography department scouts completed by  Winter García mammography technologist     705 am Hx taken procedure explained questions answered. Order verified and initialed by all staff members .   Consent signed and verified by all staff m

## 2019-05-06 NOTE — OPERATIVE REPORT
Date of operation: 5/6/2019    Preoperative diagnosis:  1. History of T1 N0 M0, G1, ER positive, OR positive, HER-2 negative right-sided invasive ductal carcinoma    Operation performed:  1. Right breast wire localized partial mastectomy  2.   Right senti The patient was brought to the operating room and laid supine in the operating table. General endotracheal anesthesia was induced with no complication. All pressure points were padded appropriately. The right arm was extended.   The right arm and breast Attention was then directed to the right breast.  A 3 cm incision was made in a skin crease at the 3 o'clock position. This was taken to the skin and subcutaneous tissue. Flaps were developed and the wire was delivered into the wound.   A 2-0 vicryl figur

## 2019-05-07 ENCOUNTER — TELEPHONE (OUTPATIENT)
Dept: SURGERY | Facility: CLINIC | Age: 64
End: 2019-05-07

## 2019-05-07 NOTE — TELEPHONE ENCOUNTER
Patient requesting refill for     Mometasone Furoate (ASMANEX 120 METERED DOSES) 220 MCG/INH Inhalation Aerosol Powder, Breath Activated Inhale 2 puffs into the lungs daily.  Disp: 3 Inhaler Rfl: 0     Patient is out of medication, Patient did mychart request but did not go through

## 2019-05-07 NOTE — TELEPHONE ENCOUNTER
Post op call made to pt. Pt states she is doing \"surprisingly\" well. Denies fevers, N/V/D. States pain is very tolerable. Pt is tolerating activity without complaints. Pt is hydrating and taking nutrition without difficulty.   Pt reports BM today/nor

## 2019-05-08 ENCOUNTER — TELEPHONE (OUTPATIENT)
Dept: FAMILY MEDICINE CLINIC | Facility: CLINIC | Age: 64
End: 2019-05-08

## 2019-05-08 ENCOUNTER — TELEPHONE (OUTPATIENT)
Dept: SURGERY | Facility: CLINIC | Age: 64
End: 2019-05-08

## 2019-05-08 RX ORDER — FLUTICASONE PROPIONATE 110 UG/1
2 AEROSOL, METERED RESPIRATORY (INHALATION) 2 TIMES DAILY
Qty: 1 INHALER | Refills: 5 | Status: SHIPPED | OUTPATIENT
Start: 2019-05-08 | End: 2019-08-03

## 2019-05-08 NOTE — TELEPHONE ENCOUNTER
Spoke with pt and  [facility] [employer] message given. Per pt \"I don't need Flovent, I already have that. I need a preventative not a rescue inhaler.   Advair would be better\"    Will inform MD.

## 2019-05-08 NOTE — TELEPHONE ENCOUNTER
Please let her know I sent Rx to pharmacy [facility] for Flovent as back-up in case Asmanex not available. I think this will be better than changing to [employer] as cost for several inhalers unsure.

## 2019-05-08 NOTE — TELEPHONE ENCOUNTER
Called patient and informed her of path results. Jameson Cruz MD  Complex General Surgical Oncology  [facility] 112  Pager [phone]  [email or web address]

## 2019-05-08 NOTE — TELEPHONE ENCOUNTER
Patient contacted, and explained that Flovent is in the same category as Asmanex. She understands. Recovering well from surgery on Monday.

## 2019-05-08 NOTE — TELEPHONE ENCOUNTER
Called [facility] to check if they have it in stock. Spoke to pharmacy. They only have 30 or 14 metered dose in stock. Please advise on rx. Thanks.

## 2019-05-09 ENCOUNTER — TELEPHONE (OUTPATIENT)
Dept: HEMATOLOGY/ONCOLOGY | Facility: HOSPITAL | Age: 64
End: 2019-05-09

## 2019-05-09 NOTE — TELEPHONE ENCOUNTER
Phoned patient for care coordination. She has been referred by Dr. Berta Hernandez to Dr. Vin Callaway for Medical Oncology consultation.     Scheduled patient for appointment on Tuesday 5/14/19 at [address] [place] [address].  Shared with patient that Dr. Fredo Montesinos would also like to meet with her to

## 2019-05-14 ENCOUNTER — OFFICE VISIT (OUTPATIENT)
Dept: HEMATOLOGY/ONCOLOGY | Facility: HOSPITAL | Age: 64
End: 2019-05-14
Attending: GENETIC COUNSELOR, MS
Payer: COMMERCIAL

## 2019-05-14 VITALS
TEMPERATURE: 98 F | BODY MASS INDEX: 24.11 KG/M2 | RESPIRATION RATE: 16 BRPM | OXYGEN SATURATION: 100 % | WEIGHT: 131 LBS | HEART RATE: 67 BPM | DIASTOLIC BLOOD PRESSURE: 69 MMHG | SYSTOLIC BLOOD PRESSURE: 135 MMHG | HEIGHT: 62 IN

## 2019-05-14 DIAGNOSIS — Z17.0 MALIGNANT NEOPLASM OF UPPER-INNER QUADRANT OF RIGHT BREAST IN FEMALE, ESTROGEN RECEPTOR POSITIVE (HCC): Primary | ICD-10-CM

## 2019-05-14 DIAGNOSIS — Z78.0 POST-MENOPAUSAL: ICD-10-CM

## 2019-05-14 DIAGNOSIS — C50.211 MALIGNANT NEOPLASM OF UPPER-INNER QUADRANT OF RIGHT BREAST IN FEMALE, ESTROGEN RECEPTOR POSITIVE (HCC): Primary | ICD-10-CM

## 2019-05-14 PROCEDURE — 99245 OFF/OP CONSLTJ NEW/EST HI 55: CPT | Performed by: INTERNAL MEDICINE

## 2019-05-14 NOTE — PATIENT INSTRUCTIONS
Hormone Therapy for Breast Cancer  Estrogen is one of the female hormones. Sometimes breast cancer cells grow and multiply when exposed to estrogen.  A hormone receptor test is done to measure the amount of certain proteins (called hormone receptors) in a Other side effects of some types of hormone therapy include:  · Loss of bone mass (osteoporosis)  · Higher cholesterol levels  · Increased risk for blood clots, heart attacks, and stroke  It's important to know which medicines you're taking and what side e During the course of your treatment, you’ll have routine visits with your doctor. You may also have tests. These allow your doctor to check your health and response to the treatment.  After treatment ends, you and your doctor will discuss your treatment res

## 2019-05-14 NOTE — PROGRESS NOTES
GRISELDA Jaimes is a 59year old female who is here today due to new diagnosis of Malignant neoplasm of upper-inner quadrant of right breast in female, estrogen receptor positive (hcc)  (primary encounter diagnosis)       Method of detection: The patient presented with an abnormal screening mammogram which was on 2/27/2019. This tomosynthesis revealed an intermediate asymmetry in the intermittent right breast, therefore additional diagnostic views and ultrasound were recommended.   She does hav HRT use:  No  Fertility treatment:  No  Ethnic back ground:  [place]  Buddhism  Negative family h/o breast cancer. First degree relatives:  0  She has a Body mass index is 23.96 kg/m².     Alcohol use Yes   Comment: 4 drinks weekly     No BRCA testin Mometasone Furoate (ASMANEX 120 METERED DOSES) 220 MCG/INH Inhalation Aerosol Powder, Breath Activated Inhale 2 puffs into the lungs daily.  Disp: 3 Inhaler Rfl: 0   Fluticasone Propionate HFA (FLOVENT HFA) 110 MCG/ACT Inhalation Aerosol Inhale 2 puffs into ondansetron 4 MG Oral Tablet Dispersible Take 1 tablet (4 mg total) by mouth every 4 (four) hours as needed for Nausea.  Disp: 20 tablet Rfl: 0     Allergies:     Sulfa Antibiotics       ANAPHYLAXIS    Comment:Other reaction(s): anaphlaxsis  Augmentin [Amox Occupation:     Social Needs      Financial resource strain: Not on file      Food insecurity:        Worry: Not on file        Inability: Not on file      Transportation needs:        Medical: Not on file        Non-medical: Not on file    Tob • Cancer Maternal Aunt         unknown primary   • Cancer Paternal Uncle 80        unknown type         PHYSICAL EXAM:    /69 (BP Location: Left arm, Patient Position: Sitting, Cuff Size: adult)   Pulse 67   Temp 97.7 °F (36.5 °C) (Oral)   Resp 16 It is recommended to consider adjuvant hormonal therapy. D/w the patient and  this recommendation.     Discussed with the patient the potential side effects of adjuvant hormonal therapy with an AI, such as hot flashes, decreased bone density, increa Tumor Markers by Immunostaining (Polymer based detection method) using the ASCO/CAP scoring criteria, performed at [facility] on formalin-fixed, paraffin-embedded tissue:      Estrogen receptor (Leica, monoclonal, clone 6 F11) status:  100% The longest continuous dimension of infiltrating carcinoma is 5 mm. Paraffin-embedded infiltrating carcinoma has been submitted for ER, KS, Ki-67, and HER-2/andrea analysis.      For  purposes, this case was reviewed by a second pathologis MAMMOGRAPHY FINDINGS:    Mole markers were placed over the right breast. A linear radiopaque marker was placed over a scar on the right breast. There are underlying postsurgical changes.  There are benign calcifications in the right breast.       There is a BB = Nipple  Linear wild = Scar   Square = Pain            Dictated by (CST): Sabrina Tejada MD on 4/03/2019 at 11:26       Approved by (CST): Sabrina Tejada MD on 4/03/2019 at 12:28     PROCEDURE:  [facility] AGATA 2D+3D SCREENING BILAT (15889/31697)     COMPARISON Recommend additional diagnostic imaging of the right breast utilizing tomosynthesis and possible ultrasound. BI-RADS CATEGORY:     DIAGNOSTIC CATEGORY 0--INCOMPLETE ASSESSMENT: NEED ADDITIONAL IMAGING EVALUATION.        RECOMMENDATIONS:   ADDITIONAL BOLA B) - Tissue, 2. Non - sentinel lymph node right axillary                                             C) - Spangler Lymph node, 3. right sentinel lymph node #2 hot, not blue                              D) - Breast, right, 4.  Right side partial 0 (Negative): No staining  1+ (Negative): Weak and incomplete membrane staining in more than 10% of tumor cells  2+ (Equivocal): Weak to moderate complete membranous staining in more than 10% of tumor cells.   3+ (Positive): Strong complete staining in mor · Benign breast tissue with fibrous changes, with no malignancy identified. I.  Right breast additional margin anterior:  · Benign breast tissue, with no malignancy identified.      J. Right breast additional margin inferior:  · Benign breast tissue, wi Breast Invasive - All Specimens   CLINICAL   Radiologic Finding  Mass or architectural distortion    SPECIMEN   Procedure  Excision (less than total mastectomy)    Specimen Laterality  Right    TUMOR   Tumor Site: Invasive Carcinoma  Upper inner quadrant Regional Lymph Nodes  Uninvolved by tumor cells    Number of Lymph Nodes Examined  3    Number of Willard Nodes Examined  2    PATHOLOGIC STAGE CLASSIFICATION (pTNM, AJCC 8th Edition)   TNM Descriptors  Not applicable    Primary Tumor (Invasive Carcinoma) Specimen D is received in formalin labeled “Chavez, right side partial mastectomy\" and consists of an oriented 37 gram portion of yellow-tan fatty tissue (2.7 cm superior to inferior by 8.0 cm medial to lateral by 4.0 cm anterior to posterior) with a pre Specimen E is received in in formalin labeled “Chavez, right breast additional margin, medial\" and consists of an oriented 2.2 x 1.3 x 1.1 cm yellow-tan fatty tissue fragment.  A stitch is identified along one aspect (inked blue) designating new true brendan Specimen I is received in formalin labeled “Chavez, right breast additional margin, anterior\" and consists of an oriented 2.3 x 1.5 x 1.0 cm yellow-tan fatty tissue fragment.  A stitch is identified along one aspect (inked blue) designating [facility]

## 2019-05-15 ENCOUNTER — OFFICE VISIT (OUTPATIENT)
Dept: SURGERY | Facility: CLINIC | Age: 64
End: 2019-05-15
Payer: COMMERCIAL

## 2019-05-15 VITALS
HEART RATE: 70 BPM | DIASTOLIC BLOOD PRESSURE: 60 MMHG | SYSTOLIC BLOOD PRESSURE: 100 MMHG | OXYGEN SATURATION: 100 % | TEMPERATURE: 98 F | RESPIRATION RATE: 16 BRPM

## 2019-05-15 DIAGNOSIS — Z09 POSTOP CHECK: Primary | ICD-10-CM

## 2019-05-19 NOTE — PROGRESS NOTES
[facility] Surgical Oncology    Patient Name:  Zina Meigs   YOB: 1955   Gender:  Female   Appt Date: 05/19/19   Provider:  Raj Cardenas MD   Insurance:  [address] [place] [address]  Referring Provider: No ref.  provider found   Addr She is a patient who is known to me.   Briefly, she is a 44-year-old female of [address] descent who had originally presented with invasive ductal carcinoma of the right breast [cT1 N0 M0, G1, ER positive, NH positive, HER-2 negative] the patient was •  FLUTICASONE PROPIONATE 50 MCG/ACT Nasal Suspension, 1 SPRAY BY NASAL ROUTE 2 (TWO) TIMES DAILY. , Disp: 1 Bottle, Rfl: 11  •  Albuterol Sulfate  (90 Base) MCG/ACT Inhalation Aero Soln, INHALE 2 PUFFS BY MOUTH FOUR TIMES DAILY AS NEEDED, Disp: 1 In • Eczema    • Esophageal reflux    • Extrinsic asthma, unspecified    • First degree AV block 2013   • High blood pressure    • Migraines    • Sinusitis       Reviewed:  Past Surgical History:   Procedure Laterality Date   • BONE DENSITY TEST SCAN  2012 Constitutional: Negative for activity change, appetite change, chills, fatigue, fever and unexpected weight change. HENT: Negative for congestion and trouble swallowing. Eyes: Negative for discharge and redness.    Respiratory: Negative for cough, ches HER2/andrea by Immunohistochemistry (Polymer based detection method) using the Consensus Panel recommendation for breast carcinoma performed at [facility] on Formalin Fixed Paraffin Embedded Tissue:      HER-2/andrea (Leica, monoclonal, clone CB1    F. Right breast additional margin lateral:  · Benign breast tissue with fibrous changes, with no malignancy identified.     G. Right breast additional margin deep:  · Benign breast tissue, with no malignancy identified.     H.   Right breast additional m The immunohistochemical stains interpreted in this case demonstrated appropriate reactivity of the positive controls.  These stains were performed on formalin-fixed, paraffin-embedded tissue sections with each antibody analysis being performed on a separate

## 2019-05-24 ENCOUNTER — APPOINTMENT (OUTPATIENT)
Dept: RADIATION ONCOLOGY | Facility: HOSPITAL | Age: 64
End: 2019-05-24
Attending: RADIOLOGY
Payer: COMMERCIAL

## 2019-05-24 VITALS
SYSTOLIC BLOOD PRESSURE: 116 MMHG | HEART RATE: 68 BPM | DIASTOLIC BLOOD PRESSURE: 72 MMHG | RESPIRATION RATE: 16 BRPM | TEMPERATURE: 98 F

## 2019-05-24 PROCEDURE — 77290 THER RAD SIMULAJ FIELD CPLX: CPT | Performed by: RADIOLOGY

## 2019-05-24 PROCEDURE — 77333 RADIATION TREATMENT AID(S): CPT | Performed by: RADIOLOGY

## 2019-05-24 PROCEDURE — 99212 OFFICE O/P EST SF 10 MIN: CPT

## 2019-05-24 NOTE — PROGRESS NOTES
Nursing Consultation Note  Patient: Rachana Manriquez  YOB: 1955  Age: 59year old  Radiation Oncologist: Dr. Glenis Perkins  Referring Physician: Alycia Aase  Diagnosis:No diagnosis found.   Consult Date: 5/24/2019      Chemotherapy: No  Labs daily. Disp: 1 Inhaler Rfl: 5   ondansetron 4 MG Oral Tablet Dispersible Take 1 tablet (4 mg total) by mouth every 4 (four) hours as needed for Nausea.  Disp: 20 tablet Rfl: 0   ondansetron 4 MG Oral Tablet Dispersible Take 1 tablet (4 mg total) by mouth ev Shonna  Store [address] [facility] [place] [facility] [place], [place], [phone], [address] [facility] [address]  Phone: [phone] Fax: [phone]    [facility] [address] 5/6/2019    Performed by Holden Resendiz MD at [facility] MAIN OR   • SKIN SURGERY  2005    scalp cyst ex x 3   • SKIN SURGERY  1/16/2015    excision scalp cysts x2       Social History    Socioeconomic History      Marital status:       Spouse name: Not o Asked        Exercise: Not Asked        Bike Helmet: Not Asked        Seat Belt: Not Asked        Self-Exams: Not Asked    Social History Narrative      Not on file      ECOG:  Grade 0 - Fully active, able to carry on all predisease activities without rest

## 2019-05-24 NOTE — PROGRESS NOTES
RADIATION ONCOLOGY NOTE    DATE OF VISIT: 5/24/2019    DIAGNOSIS :  Stage IA pT1a N M0, IDC of right breast, receptor positive, G1, negative genetics, s/p lumpectomy and SLNB, for adjuvant XRT.     Dear Yeny Vickers, Glenda Maharaj  and colleagues,        As into the lungs daily. Disp: 3 Inhaler Rfl: 0   Fluticasone Propionate HFA (FLOVENT HFA) 110 MCG/ACT Inhalation Aerosol Inhale 2 puffs into the lungs 2 (two) times daily.  Disp: 1 Inhaler Rfl: 5   ondansetron 4 MG Oral Tablet Dispersible Take 1 tablet (4 mg Right    Result Date: 4/15/2019  CONCLUSION:   Uneventful ultrasound-guided biopsy of the 0.4 cm mass at 2 o'clock 3 cm from the nipple within the right breast, marked with the Q clip in appropriate position, corresponding to the spiculated mass seen on ma wire in place. Dr. Chidi Hall  was informed of the successful excision.      Dictated by (CST): Mckinley Lew MD on 5/06/2019 at 11:41     Approved by (CST): Mckinley Lew MD on 5/06/2019 at 2425 Yahir Keenan 2d+3d Diagnostic Addl Vws Right ( SUSPICIOUS PALPABLE LUMP SHOULD BE BIOPSIED. For patients over the age of 36, the target due date for the patient's next mammogram has been entered into a reminder system.    Patient received a discharge summary from the technologist after completion of e marks deep margin, green marks superficial margin, white marks medial margin,                purple marks lateral margin                                                                       E) - Breast, right, 5.  Right breast additional margin medial, sti Immunohistochemical positive control is examined and showed appropriate reactivity. Addendum electronically signed by Diane Leyva MD on 5/8/2019 at 4326 Final Diagnosis:      A.  Right sentinel lymph node #1:  · One lymph node negative for metastatic car Markers by Immunostaining (Polymer based detection method) using the ASCO/CAP scoring criteria, performed on the patient's previous biopsy ([id number], collected 4/12/2019) at [facility] on formalin-fixed, paraffin-embedded tissue:      Est

## 2019-05-29 PROCEDURE — 77300 RADIATION THERAPY DOSE PLAN: CPT | Performed by: RADIOLOGY

## 2019-05-29 PROCEDURE — 77295 3-D RADIOTHERAPY PLAN: CPT | Performed by: RADIOLOGY

## 2019-05-29 PROCEDURE — 77334 RADIATION TREATMENT AID(S): CPT | Performed by: RADIOLOGY

## 2019-06-01 ENCOUNTER — APPOINTMENT (OUTPATIENT)
Dept: RADIATION ONCOLOGY | Facility: HOSPITAL | Age: 64
End: 2019-06-01
Attending: RADIOLOGY
Payer: COMMERCIAL

## 2019-06-03 ENCOUNTER — APPOINTMENT (OUTPATIENT)
Dept: RADIATION ONCOLOGY | Facility: HOSPITAL | Age: 64
End: 2019-06-03
Attending: RADIOLOGY
Payer: COMMERCIAL

## 2019-06-03 VITALS — HEART RATE: 66 BPM | DIASTOLIC BLOOD PRESSURE: 76 MMHG | SYSTOLIC BLOOD PRESSURE: 118 MMHG | RESPIRATION RATE: 16 BRPM

## 2019-06-03 DIAGNOSIS — C50.211 MALIGNANT NEOPLASM OF UPPER-INNER QUADRANT OF RIGHT BREAST IN FEMALE, ESTROGEN RECEPTOR POSITIVE (HCC): Primary | ICD-10-CM

## 2019-06-03 DIAGNOSIS — Z17.0 MALIGNANT NEOPLASM OF UPPER-INNER QUADRANT OF RIGHT BREAST IN FEMALE, ESTROGEN RECEPTOR POSITIVE (HCC): Primary | ICD-10-CM

## 2019-06-03 PROCEDURE — 77280 THER RAD SIMULAJ FIELD SMPL: CPT | Performed by: RADIOLOGY

## 2019-06-03 PROCEDURE — 77412 RADIATION TX DELIVERY LVL 3: CPT | Performed by: RADIOLOGY

## 2019-06-04 PROCEDURE — 77290 THER RAD SIMULAJ FIELD CPLX: CPT | Performed by: RADIOLOGY

## 2019-06-04 PROCEDURE — 77387 GUIDANCE FOR RADJ TX DLVR: CPT | Performed by: RADIOLOGY

## 2019-06-04 PROCEDURE — 77412 RADIATION TX DELIVERY LVL 3: CPT | Performed by: RADIOLOGY

## 2019-06-05 PROCEDURE — 77387 GUIDANCE FOR RADJ TX DLVR: CPT | Performed by: RADIOLOGY

## 2019-06-05 PROCEDURE — 77412 RADIATION TX DELIVERY LVL 3: CPT | Performed by: RADIOLOGY

## 2019-06-06 PROCEDURE — 77412 RADIATION TX DELIVERY LVL 3: CPT | Performed by: RADIOLOGY

## 2019-06-06 PROCEDURE — 77387 GUIDANCE FOR RADJ TX DLVR: CPT | Performed by: RADIOLOGY

## 2019-06-07 PROCEDURE — 77336 RADIATION PHYSICS CONSULT: CPT | Performed by: RADIOLOGY

## 2019-06-07 PROCEDURE — 77412 RADIATION TX DELIVERY LVL 3: CPT | Performed by: RADIOLOGY

## 2019-06-07 PROCEDURE — 77387 GUIDANCE FOR RADJ TX DLVR: CPT | Performed by: RADIOLOGY

## 2019-06-10 ENCOUNTER — OFFICE VISIT (OUTPATIENT)
Dept: RADIATION ONCOLOGY | Facility: HOSPITAL | Age: 64
End: 2019-06-10
Attending: RADIOLOGY
Payer: COMMERCIAL

## 2019-06-10 VITALS — RESPIRATION RATE: 16 BRPM | DIASTOLIC BLOOD PRESSURE: 75 MMHG | HEART RATE: 66 BPM | SYSTOLIC BLOOD PRESSURE: 143 MMHG

## 2019-06-10 DIAGNOSIS — Z17.0 MALIGNANT NEOPLASM OF UPPER-INNER QUADRANT OF RIGHT BREAST IN FEMALE, ESTROGEN RECEPTOR POSITIVE (HCC): Primary | ICD-10-CM

## 2019-06-10 DIAGNOSIS — C50.211 MALIGNANT NEOPLASM OF UPPER-INNER QUADRANT OF RIGHT BREAST IN FEMALE, ESTROGEN RECEPTOR POSITIVE (HCC): Primary | ICD-10-CM

## 2019-06-10 PROCEDURE — 77412 RADIATION TX DELIVERY LVL 3: CPT | Performed by: RADIOLOGY

## 2019-06-10 PROCEDURE — 77387 GUIDANCE FOR RADJ TX DLVR: CPT | Performed by: RADIOLOGY

## 2019-06-10 NOTE — PROGRESS NOTES
[facility] Radiation Treatment Management Note 6-10    Patient:  Deja Flores  Age:  59year old  Visit Diagnosis:    1.  Malignant neoplasm of upper-inner quadrant of right breast in female, estrogen receptor positive ([facility] [address]

## 2019-06-11 PROCEDURE — 77387 GUIDANCE FOR RADJ TX DLVR: CPT | Performed by: RADIOLOGY

## 2019-06-11 PROCEDURE — 77412 RADIATION TX DELIVERY LVL 3: CPT | Performed by: RADIOLOGY

## 2019-06-12 PROCEDURE — 77412 RADIATION TX DELIVERY LVL 3: CPT | Performed by: RADIOLOGY

## 2019-06-12 PROCEDURE — 77387 GUIDANCE FOR RADJ TX DLVR: CPT | Performed by: RADIOLOGY

## 2019-06-13 PROCEDURE — 77412 RADIATION TX DELIVERY LVL 3: CPT | Performed by: RADIOLOGY

## 2019-06-13 PROCEDURE — 77387 GUIDANCE FOR RADJ TX DLVR: CPT | Performed by: RADIOLOGY

## 2019-06-14 PROCEDURE — 77412 RADIATION TX DELIVERY LVL 3: CPT | Performed by: RADIOLOGY

## 2019-06-14 PROCEDURE — 77387 GUIDANCE FOR RADJ TX DLVR: CPT | Performed by: RADIOLOGY

## 2019-06-17 ENCOUNTER — OFFICE VISIT (OUTPATIENT)
Dept: RADIATION ONCOLOGY | Facility: HOSPITAL | Age: 64
End: 2019-06-17
Attending: RADIOLOGY
Payer: COMMERCIAL

## 2019-06-17 VITALS — HEART RATE: 70 BPM | RESPIRATION RATE: 16 BRPM | DIASTOLIC BLOOD PRESSURE: 74 MMHG | SYSTOLIC BLOOD PRESSURE: 146 MMHG

## 2019-06-17 DIAGNOSIS — C50.211 MALIGNANT NEOPLASM OF UPPER-INNER QUADRANT OF RIGHT BREAST IN FEMALE, ESTROGEN RECEPTOR POSITIVE (HCC): Primary | ICD-10-CM

## 2019-06-17 DIAGNOSIS — Z17.0 MALIGNANT NEOPLASM OF UPPER-INNER QUADRANT OF RIGHT BREAST IN FEMALE, ESTROGEN RECEPTOR POSITIVE (HCC): Primary | ICD-10-CM

## 2019-06-17 PROCEDURE — 77412 RADIATION TX DELIVERY LVL 3: CPT | Performed by: RADIOLOGY

## 2019-06-17 PROCEDURE — 77336 RADIATION PHYSICS CONSULT: CPT | Performed by: RADIOLOGY

## 2019-06-17 PROCEDURE — 77387 GUIDANCE FOR RADJ TX DLVR: CPT | Performed by: RADIOLOGY

## 2019-06-17 NOTE — PROGRESS NOTES
[facility] Radiation Treatment Management Note 11-15    Patient:  Jennifer Geronimo  Age:  59year old  Visit Diagnosis:    1.  Malignant neoplasm of upper-inner quadrant of right breast in female, estrogen receptor positive ([facility] [address]

## 2019-06-18 ENCOUNTER — APPOINTMENT (OUTPATIENT)
Dept: RADIATION ONCOLOGY | Facility: HOSPITAL | Age: 64
End: 2019-06-18
Attending: RADIOLOGY
Payer: COMMERCIAL

## 2019-06-18 PROCEDURE — 77290 THER RAD SIMULAJ FIELD CPLX: CPT | Performed by: RADIOLOGY

## 2019-06-18 PROCEDURE — 77412 RADIATION TX DELIVERY LVL 3: CPT | Performed by: RADIOLOGY

## 2019-06-18 PROCEDURE — 77334 RADIATION TREATMENT AID(S): CPT | Performed by: RADIOLOGY

## 2019-06-19 PROCEDURE — 77387 GUIDANCE FOR RADJ TX DLVR: CPT | Performed by: RADIOLOGY

## 2019-06-19 PROCEDURE — 77412 RADIATION TX DELIVERY LVL 3: CPT | Performed by: RADIOLOGY

## 2019-06-20 PROCEDURE — 77412 RADIATION TX DELIVERY LVL 3: CPT | Performed by: RADIOLOGY

## 2019-06-20 PROCEDURE — 77387 GUIDANCE FOR RADJ TX DLVR: CPT | Performed by: RADIOLOGY

## 2019-06-20 PROCEDURE — 77321 SPECIAL TELETX PORT PLAN: CPT | Performed by: RADIOLOGY

## 2019-06-20 PROCEDURE — 77334 RADIATION TREATMENT AID(S): CPT | Performed by: RADIOLOGY

## 2019-06-20 PROCEDURE — 77290 THER RAD SIMULAJ FIELD CPLX: CPT | Performed by: RADIOLOGY

## 2019-06-21 PROCEDURE — 77336 RADIATION PHYSICS CONSULT: CPT | Performed by: RADIOLOGY

## 2019-06-21 PROCEDURE — 77387 GUIDANCE FOR RADJ TX DLVR: CPT | Performed by: RADIOLOGY

## 2019-06-21 PROCEDURE — 77412 RADIATION TX DELIVERY LVL 3: CPT | Performed by: RADIOLOGY

## 2019-06-24 ENCOUNTER — OFFICE VISIT (OUTPATIENT)
Dept: RADIATION ONCOLOGY | Facility: HOSPITAL | Age: 64
End: 2019-06-24
Attending: RADIOLOGY
Payer: COMMERCIAL

## 2019-06-24 VITALS — SYSTOLIC BLOOD PRESSURE: 124 MMHG | RESPIRATION RATE: 16 BRPM | HEART RATE: 73 BPM | DIASTOLIC BLOOD PRESSURE: 91 MMHG

## 2019-06-24 DIAGNOSIS — C50.211 MALIGNANT NEOPLASM OF UPPER-INNER QUADRANT OF RIGHT BREAST IN FEMALE, ESTROGEN RECEPTOR POSITIVE (HCC): Primary | ICD-10-CM

## 2019-06-24 DIAGNOSIS — Z17.0 MALIGNANT NEOPLASM OF UPPER-INNER QUADRANT OF RIGHT BREAST IN FEMALE, ESTROGEN RECEPTOR POSITIVE (HCC): Primary | ICD-10-CM

## 2019-06-24 PROCEDURE — 77412 RADIATION TX DELIVERY LVL 3: CPT | Performed by: RADIOLOGY

## 2019-06-24 PROCEDURE — 77387 GUIDANCE FOR RADJ TX DLVR: CPT | Performed by: RADIOLOGY

## 2019-06-24 NOTE — PROGRESS NOTES
[facility] Radiation Treatment Management Note 16-20    Patient:  Garret Kinsey  Age:  59year old  Visit Diagnosis:    1.  Malignant neoplasm of upper-inner quadrant of right breast in female, estrogen receptor positive ([facility] [address]

## 2019-06-25 ENCOUNTER — APPOINTMENT (OUTPATIENT)
Dept: RADIATION ONCOLOGY | Facility: HOSPITAL | Age: 64
End: 2019-06-25
Attending: RADIOLOGY
Payer: COMMERCIAL

## 2019-06-25 PROCEDURE — 77412 RADIATION TX DELIVERY LVL 3: CPT | Performed by: RADIOLOGY

## 2019-06-25 PROCEDURE — 77280 THER RAD SIMULAJ FIELD SMPL: CPT | Performed by: RADIOLOGY

## 2019-06-26 PROCEDURE — 77412 RADIATION TX DELIVERY LVL 3: CPT | Performed by: RADIOLOGY

## 2019-06-26 RX ORDER — LOSARTAN POTASSIUM 100 MG/1
TABLET ORAL
Qty: 30 TABLET | Refills: 2 | OUTPATIENT
Start: 2019-06-26

## 2019-06-27 PROCEDURE — 77412 RADIATION TX DELIVERY LVL 3: CPT | Performed by: RADIOLOGY

## 2019-06-28 ENCOUNTER — DOCUMENTATION ONLY (OUTPATIENT)
Dept: RADIATION ONCOLOGY | Facility: HOSPITAL | Age: 64
End: 2019-06-28

## 2019-06-28 PROCEDURE — 77336 RADIATION PHYSICS CONSULT: CPT | Performed by: RADIOLOGY

## 2019-06-28 PROCEDURE — 77412 RADIATION TX DELIVERY LVL 3: CPT | Performed by: RADIOLOGY

## 2019-06-28 NOTE — PATIENT INSTRUCTIONS
Follow up with Dr. Sohan Palmer in 3-4 months (September/October 2019). Call [phone] to schedule a follow up appointment. For any issues related to radiation call the nurse at [phone].

## 2019-06-28 NOTE — PROGRESS NOTES
RADIATION ONCOLOGY COMPLETION SUMMARY NOTE    DIAGNOSIS :  Stage IA pT1a N0(sn) M0, IDC of right breast, receptor positive, G1, negative genetics, s/p lumpectomy, SLNB and now adjuvant XRT on 6/28/2019.     Dear Drs Dorian Rubinstein  and colleagues,      As questions. Karolyn Ladd MD, [address] [email or web address]  [phone]

## 2019-07-02 RX ORDER — LOSARTAN POTASSIUM 100 MG/1
TABLET ORAL
Qty: 30 TABLET | Refills: 2 | OUTPATIENT
Start: 2019-07-02

## 2019-07-03 ENCOUNTER — HOSPITAL ENCOUNTER (OUTPATIENT)
Age: 64
Discharge: HOME | End: 2019-07-03
Attending: FAMILY MEDICINE
Payer: COMMERCIAL

## 2019-07-03 ENCOUNTER — NURSE TRIAGE (OUTPATIENT)
Dept: OTHER | Age: 64
End: 2019-07-03

## 2019-07-03 VITALS
SYSTOLIC BLOOD PRESSURE: 113 MMHG | HEART RATE: 90 BPM | WEIGHT: 128 LBS | HEIGHT: 62 IN | RESPIRATION RATE: 18 BRPM | TEMPERATURE: 98 F | OXYGEN SATURATION: 98 % | DIASTOLIC BLOOD PRESSURE: 64 MMHG | BODY MASS INDEX: 23.55 KG/M2

## 2019-07-03 DIAGNOSIS — L03.012 CELLULITIS OF FINGER OF LEFT HAND: Primary | ICD-10-CM

## 2019-07-03 PROCEDURE — 99213 OFFICE O/P EST LOW 20 MIN: CPT

## 2019-07-03 PROCEDURE — 99214 OFFICE O/P EST MOD 30 MIN: CPT

## 2019-07-03 RX ORDER — CEPHALEXIN 500 MG/1
500 TABLET ORAL 2 TIMES DAILY
Qty: 20 TABLET | Refills: 0 | Status: SHIPPED | OUTPATIENT
Start: 2019-07-03 | End: 2019-07-13

## 2019-07-03 NOTE — ED INITIAL ASSESSMENT (HPI)
Pt states 4 days ago accidentally bent nail backwards on left index finger when reaching for something pt states since then area around fingernail has become swollen and painful. No

## 2019-07-03 NOTE — ED PROVIDER NOTES
Patient Seen in: [address]    History   Patient presents with:  Rash Skin Problem (integumentary)    Stated Complaint: lt hand finger injury    HPI    27-year-old female presents with left second digit pain and swelling MAIN OR   • SKIN SURGERY  2005    scalp cyst ex x 3   • SKIN SURGERY  1/16/2015    excision scalp cysts x2       Family history reviewed and is not pertinent to presenting problem.     Social History    Tobacco Use      Smoking status: Never Smoker      Smo area of fluctuance or pustular discharge appears. Emergency room precautions and clinical course discussed and verbal understanding obtained.               Disposition and Plan     Clinical Impression:  Cellulitis of finger of left hand  (primary encounter

## 2019-07-03 NOTE — TELEPHONE ENCOUNTER
Action Requested: Summary for Provider     []  Critical Lab, Recommendations Needed  [] Need Additional Advice  [x]   FYI    []   Need Orders  [] Need Medications Sent to Pharmacy  []  Other     SUMMARY: Patient going to OP UC or WIC in [place] today.  Patient w

## 2019-07-04 ENCOUNTER — HOSPITAL ENCOUNTER (OUTPATIENT)
Age: 64
Discharge: HOME | End: 2019-07-04
Attending: FAMILY MEDICINE
Payer: COMMERCIAL

## 2019-07-04 VITALS
OXYGEN SATURATION: 96 % | RESPIRATION RATE: 18 BRPM | SYSTOLIC BLOOD PRESSURE: 117 MMHG | TEMPERATURE: 98 F | HEART RATE: 79 BPM | DIASTOLIC BLOOD PRESSURE: 68 MMHG

## 2019-07-04 DIAGNOSIS — L02.91 ABSCESS: Primary | ICD-10-CM

## 2019-07-04 PROCEDURE — 10060 I&D ABSCESS SIMPLE/SINGLE: CPT

## 2019-07-04 PROCEDURE — 99213 OFFICE O/P EST LOW 20 MIN: CPT

## 2019-07-04 NOTE — ED PROVIDER NOTES
[address] Se[address]: [address]    History   Patient presents with:  Cellulitis (integumentary, infectious)    Stated Complaint: POSSIBLE INFECTION IN LEFT INDEX FINGER    HPI    59-year-old female presents with left index f pertinent to presenting problem. Social History    Tobacco Use      Smoking status: Never Smoker      Smokeless tobacco: Never Used      Tobacco comment: No household smokers.      Alcohol use: Yes      Comment: 4 drinks weekly    Drug use: Never      Re extremity  Location details:  Finger 2  left    Anesthesia:   Anesthesia:  Digital block  Local anesthetic:  lidocaine 1% without epinephrine  Anesthetic total (mL):  5      Procedure Details:  Risk factor:   Proximity to nail bed  Scalpel Size:  11  Incis

## 2019-07-04 NOTE — ED INITIAL ASSESSMENT (HPI)
Per pt was seen yesterday started taking antibiotics and feels as if symptoms worse. Pt denies any fevers.

## 2019-07-06 ENCOUNTER — HOSPITAL ENCOUNTER (OUTPATIENT)
Age: 64
Discharge: HOME | End: 2019-07-06
Attending: FAMILY MEDICINE
Payer: COMMERCIAL

## 2019-07-06 VITALS
TEMPERATURE: 98 F | OXYGEN SATURATION: 99 % | SYSTOLIC BLOOD PRESSURE: 121 MMHG | DIASTOLIC BLOOD PRESSURE: 66 MMHG | HEART RATE: 75 BPM | RESPIRATION RATE: 16 BRPM

## 2019-07-06 DIAGNOSIS — L02.91 ABSCESS: Primary | ICD-10-CM

## 2019-07-06 PROCEDURE — 99211 OFF/OP EST MAY X REQ PHY/QHP: CPT

## 2019-07-06 NOTE — ED PROVIDER NOTES
Patient Seen in: [address]    History   Patient presents with:  Wound Recheck    Stated Complaint: wound check    HPI    62-year-old female here for wound check after recent I&D of a left second digit abscess.   She repo Smokeless tobacco: Never Used      Tobacco comment: No household smokers. Alcohol use: Yes      Comment: 4 drinks weekly    Drug use: Never      Review of Systems    Positive for stated complaint: wound check  Other systems are as noted in HPI.   Jessie

## 2019-07-06 NOTE — ED INITIAL ASSESSMENT (HPI)
Pt here for wound check denies any complains reports packing fell out today denies fevers chills or streaking

## 2019-07-11 ENCOUNTER — HOSPITAL ENCOUNTER (OUTPATIENT)
Dept: BONE DENSITY | Facility: HOSPITAL | Age: 64
Discharge: HOME | End: 2019-07-11
Attending: INTERNAL MEDICINE
Payer: COMMERCIAL

## 2019-07-11 DIAGNOSIS — M85.89 OSTEOPENIA OF MULTIPLE SITES: Primary | ICD-10-CM

## 2019-07-11 DIAGNOSIS — Z78.0 POST-MENOPAUSAL: ICD-10-CM

## 2019-07-11 PROCEDURE — 77080 DXA BONE DENSITY AXIAL: CPT | Performed by: INTERNAL MEDICINE

## 2019-07-15 ENCOUNTER — OFFICE VISIT (OUTPATIENT)
Dept: HEMATOLOGY/ONCOLOGY | Facility: HOSPITAL | Age: 64
End: 2019-07-15
Attending: GENETIC COUNSELOR, MS
Payer: COMMERCIAL

## 2019-07-15 VITALS
BODY MASS INDEX: 24 KG/M2 | TEMPERATURE: 98 F | WEIGHT: 133.38 LBS | SYSTOLIC BLOOD PRESSURE: 128 MMHG | DIASTOLIC BLOOD PRESSURE: 72 MMHG | HEART RATE: 70 BPM | RESPIRATION RATE: 18 BRPM

## 2019-07-15 DIAGNOSIS — C50.211 MALIGNANT NEOPLASM OF UPPER-INNER QUADRANT OF RIGHT BREAST IN FEMALE, ESTROGEN RECEPTOR POSITIVE (HCC): Primary | ICD-10-CM

## 2019-07-15 DIAGNOSIS — Z17.0 MALIGNANT NEOPLASM OF UPPER-INNER QUADRANT OF RIGHT BREAST IN FEMALE, ESTROGEN RECEPTOR POSITIVE (HCC): Primary | ICD-10-CM

## 2019-07-15 PROCEDURE — 99214 OFFICE O/P EST MOD 30 MIN: CPT | Performed by: INTERNAL MEDICINE

## 2019-07-15 RX ORDER — LETROZOLE 2.5 MG/1
2.5 TABLET, FILM COATED ORAL DAILY
Qty: 30 TABLET | Refills: 6 | Status: SHIPPED | OUTPATIENT
Start: 2019-07-15 | End: 2020-01-10

## 2019-07-15 NOTE — PATIENT INSTRUCTIONS
Letrozole tablets  Brand Name: Kaycee Katz  What is this medicine? LETROZOLE (LET lita zole) blocks the production of estrogen. It is used to treat breast cancer. How should I use this medicine? Take this medicine by mouth with a glass of water.  You may essence Store between 15 and 30 degrees C (59 and 86 degrees F). Throw away any unused medicine after the expiration date. What should I tell my health care provider before I take this medicine?   They need to know if you have any of these conditions:  · high chol

## 2019-07-15 NOTE — PROGRESS NOTES
GRISELDA       Bia Londono is a 59year old female who is here today for follow-up of diagnosis of Malignant neoplasm of upper-inner quadrant of right breast in female, estrogen receptor positive (hcc)  (primary encounter diagnosis)       The patient co Cholecalciferol (VITAMIN D) 1000 units Oral Tab Take 1 tablet by mouth daily.  Disp:  Rfl:    NON FORMULARY  Disp:  Rfl:    Tacrolimus 0.03 % External Ointment BENI TO FACIAL RASH BID Disp:  Rfl: 0   buPROPion HCl ER, XL, 150 MG Oral Tablet 24 Hr Take 1 tabl Fish                    OTHER (SEE COMMENTS)    Comment:Other reaction(s): Facial Swelling  Adhesive Tape (Yelena*    RASH    Comment:From Tegaderm dressing after breast surgery    Past Medical History:   Diagnosis Date   • Anxiety state    • Ashkenazi Jewis prostate ca; Arcenio Brenner and wait\"   • Cancer Maternal Aunt         unknown primary   • Cancer Paternal Uncle 80        unknown type         PHYSICAL EXAM:    /72 (BP Location: Right arm, Patient Position: Sitting, Cuff Size: adult)   Pulse 70   Te Patient has completed breast conservation with a lumpectomy on 5/6/2019 and completed adjuvant radiation therapy on 6/28/2019.     Discussed again with the patient that given her tumor being ER ND positive it is recommended to consider adjuvant hormonal the No results found for this or any previous visit (from the past 48 hour(s)). Imaging & Referrals:  None   No orders of the defined types were placed in this encounter.     PROCEDURE:  XR DEXA BONE DENSITOMETRY (CPT=77080)     COMPARISON: Josselin Gonzales * Low bone mass (T score between -1.0 and -2.5 at the femoral neck or spine) and a 10-year probability of a hip fracture > 3% or a 10-year probability of a major osteoporosis-related fracture > 20% based on the [place]-adapted WHO algorithm              =====

## 2019-07-19 DIAGNOSIS — C50.211 MALIGNANT NEOPLASM OF UPPER-INNER QUADRANT OF RIGHT BREAST IN FEMALE, ESTROGEN RECEPTOR POSITIVE (HCC): Primary | ICD-10-CM

## 2019-07-19 DIAGNOSIS — Z17.0 MALIGNANT NEOPLASM OF UPPER-INNER QUADRANT OF RIGHT BREAST IN FEMALE, ESTROGEN RECEPTOR POSITIVE (HCC): Primary | ICD-10-CM

## 2019-08-03 RX ORDER — FLUTICASONE PROPIONATE 220 UG/1
AEROSOL, METERED RESPIRATORY (INHALATION)
Qty: 1 INHALER | Refills: 0 | Status: SHIPPED | OUTPATIENT
Start: 2019-08-03 | End: 2020-06-06

## 2019-08-03 NOTE — TELEPHONE ENCOUNTER
I spoke with Pt and Marsha WARD. It has been clarified that Pt doesn't  Need an OV for this refill and her PCP is Dr. Garret Hoover. Pt had a recent OV with PCP. Pt is out of town and I will let her know that her refill will be sent to [facility] pharmacy.

## 2019-08-03 NOTE — TELEPHONE ENCOUNTER
To reception staff, pls call pt and verify PCP and offer an appt, once information verified, pls send back to Triage. Thanks. Refill passed per [facility], [facility] protocol.     Refill Protocol Appointment Criteria  · Appointment scheduled in the past 6

## 2019-08-03 NOTE — TELEPHONE ENCOUNTER
Refill passed per [facility] protocol.   Refill Protocol Appointment Criteria  · Appointment scheduled in the past 6 months or in the next 3 months  Recent Outpatient Visits            2 weeks ago Malignant neoplasm of upper-inner quadrant of right tina

## 2019-08-08 RX ORDER — MONTELUKAST SODIUM 10 MG/1
10 TABLET ORAL NIGHTLY
Qty: 90 TABLET | Refills: 1 | Status: SHIPPED | OUTPATIENT
Start: 2019-08-08 | End: 2020-02-03

## 2019-08-08 NOTE — TELEPHONE ENCOUNTER
Refill passed per [facility], [facility] protocol.     Refill Protocol Appointment Criteria  · Appointment scheduled in the past 6 months or in the next 3 months  Recent Outpatient Visits            3 weeks ago Malignant neoplasm of upper-inner quadrant of right b

## 2019-08-13 ENCOUNTER — TELEPHONE (OUTPATIENT)
Dept: SURGERY | Facility: CLINIC | Age: 64
End: 2019-08-13

## 2019-08-13 ENCOUNTER — TELEPHONE (OUTPATIENT)
Dept: HEMATOLOGY/ONCOLOGY | Facility: HOSPITAL | Age: 64
End: 2019-08-13

## 2019-08-13 NOTE — TELEPHONE ENCOUNTER
Called pt to schedule 3month f/u.  appt made.   Future Appointments   Date Time Provider Ta Hutchins   8/20/2019  9:00 AM TRACEY Arriaza [address] [place] [address] HEM ONC [facility]   8/28/2019 11:15 AM Sharan Olson MD [facility]   1/21/2020  3:30 P

## 2019-08-13 NOTE — TELEPHONE ENCOUNTER
Returned phone call. Pt informed that Dr. Lauren Asencio recommending over the counter Calcium with Vitamin D twice a day. Informed should have Vitamin D level drawn and if low Dr. Lauren Asencio will send prescription Vitamin D.  Discussed may take Flax Seed start with 1000 mg

## 2019-08-20 ENCOUNTER — OFFICE VISIT (OUTPATIENT)
Dept: HEMATOLOGY/ONCOLOGY | Facility: HOSPITAL | Age: 64
End: 2019-08-20
Attending: NURSE PRACTITIONER
Payer: COMMERCIAL

## 2019-08-20 DIAGNOSIS — Z08 ENCOUNTER FOR FOLLOW-UP EXAMINATION AFTER COMPLETED TREATMENT FOR MALIGNANT NEOPLASM: Primary | ICD-10-CM

## 2019-08-20 DIAGNOSIS — Z71.9 COUNSELING, UNSPECIFIED: ICD-10-CM

## 2019-08-20 DIAGNOSIS — Z85.3 PERSONAL HISTORY OF BREAST CANCER: ICD-10-CM

## 2019-08-20 PROCEDURE — 99215 OFFICE O/P EST HI 40 MIN: CPT | Performed by: NURSE PRACTITIONER

## 2019-08-20 RX ORDER — DIMENHYDRINATE 50 MG
TABLET ORAL 3 TIMES DAILY
COMMUNITY

## 2019-08-20 RX ORDER — PROPRANOLOL/HYDROCHLOROTHIAZID 40 MG-25MG
TABLET ORAL 3 TIMES DAILY
COMMUNITY
End: 2021-01-21

## 2019-08-20 NOTE — PROGRESS NOTES
I met with Jeanette for a Survivorship Clinic visit to provide a survivorship care plan (SCP) and information related to post-treatment care. She came to the visit alone.   She has a diagnosis of right breast cancer and was treated with a right lumpectomy and will get a future appointment with Dr. Huong Lund.  She plans to get a baseline Vitamin D level drawn soon per Dr. Sabrina Natarajan instruction.      Reviewed Schedule of other clinic visits with Primary Care and specialists: Dr. Vannesa Ordoñez will continue to manage all gene was spent counseling regarding survivorship education and all of the information contained in the Oncology Treatment Summary SCP and in the additional resources provided.   Sasha Coronel, APRN

## 2019-08-28 ENCOUNTER — OFFICE VISIT (OUTPATIENT)
Dept: SURGERY | Facility: CLINIC | Age: 64
End: 2019-08-28
Payer: COMMERCIAL

## 2019-08-28 VITALS
RESPIRATION RATE: 16 BRPM | SYSTOLIC BLOOD PRESSURE: 142 MMHG | TEMPERATURE: 98 F | DIASTOLIC BLOOD PRESSURE: 84 MMHG | BODY MASS INDEX: 24 KG/M2 | OXYGEN SATURATION: 99 % | HEART RATE: 71 BPM | WEIGHT: 132 LBS

## 2019-08-28 DIAGNOSIS — Z09 POSTOP CHECK: Primary | ICD-10-CM

## 2019-09-02 NOTE — PROGRESS NOTES
[facility] Surgical Oncology    Patient Name:  Claudine Luis   YOB: 1955   Gender:  Female   Appt Date: 8/28/2019   Provider:  Gordon Montalvo MD   Insurance:  [facility]  Referring Provider: No ref.  provider found   Add Medications Reviewed:    Current Outpatient Medications:   •  Flaxseed, Linseed, (FLAX SEED OIL) 1000 MG Oral Cap, Take by mouth 3 (three) times daily. , Disp: , Rfl:   •  Turmeric 500 MG Oral Cap, Take by mouth 3 (three) times daily. , Disp: , Rfl:   •  Paul Comment:Other reaction(s): anaphlaxsis  Augmentin [Amoxicil*    NAUSEA AND VOMITING  Fish                    OTHER (SEE COMMENTS)    Comment:Other reaction(s): Facial Swelling  Adhesive Tape (Yelena*    RASH    Comment:From Tegaderm dressing after breast s Smoking status: Never Smoker      Smokeless tobacco: Never Used      Tobacco comment: No household smokers.      Substance and Sexual Activity      Alcohol use: Yes        Comment: 4 drinks weekly      Drug use: Never    Other Topics      Concerns: Pulmonary/Chest: Effort normal and breath sounds normal. No respiratory distress. She has no wheezes. She has no rales. Right breast exhibits no mass. Left breast exhibits no mass. Abdominal: Soft. She exhibits no distension. There is no tenderness.    Stas Rose · Invasive ductal carcinoma, Wade grade 1 (nuclear 1, tubular 1, mitotic 1), 4 mm in greatest dimension, with associated microcalcifications. · Lymphovascular invasion is not identified.   · All inked surgical margins are negative for carcinoma (near                 < 1% (Negative)      HER-2/andrea:  0 (Negative):  No staining                       1+ (Negative):  Weak and incomplete membrane staining in > 10% of tumor cells.                        2+ (Equivocal):  Weak to moderate complete membranous sta

## 2019-09-11 ENCOUNTER — PATIENT MESSAGE (OUTPATIENT)
Dept: FAMILY MEDICINE CLINIC | Facility: CLINIC | Age: 64
End: 2019-09-11

## 2019-09-11 DIAGNOSIS — Z12.11 COLON CANCER SCREENING: Primary | ICD-10-CM

## 2019-09-11 NOTE — TELEPHONE ENCOUNTER
From: Re Baker  To: Shante Dawson MD  Sent: 9/11/2019 11:28 AM CDT  Subject: Referral Request    Could you recommend a doctor for my colonoscopy?   Thanks

## 2019-09-20 ENCOUNTER — TELEPHONE (OUTPATIENT)
Dept: HEMATOLOGY/ONCOLOGY | Facility: HOSPITAL | Age: 64
End: 2019-09-20

## 2019-09-20 NOTE — TELEPHONE ENCOUNTER
Please let pt know Dr Katie Polanco recommends starting flax seed oil 7448-7129 mg daily. This will help with hot flashes and skin. Hot flashes are worst at the beginning and usually improve over time.

## 2019-09-20 NOTE — TELEPHONE ENCOUNTER
I called Olga Lidia Post to update her Dr Akira Carbajal wants her to take Flax seed oil 4373-5393 mg per day. This will help with hot flashes & itchy skin. Hot flashes are worst at the beginning of treatment and usually improve over time.  Olga Lidia Post verbalized understanding &

## 2019-09-20 NOTE — TELEPHONE ENCOUNTER
Toxicities:  Letrozole 2.5 mg po daily           Last f/u appt with Dr Clovis Bojorquez on 7/15/2019   Due for 6 month f/u appt on 1/21/2020     Insomnia/Hot Flashes with Night sweats/Itching      Insomnia: Grade 2 (Pt being woken up by hot flashes, night sweats)  Hot

## 2019-09-20 NOTE — TELEPHONE ENCOUNTER
Lilliana Barton called to say that she has been having a reaction to her meds. It has lead to sleeping issues as well as making her sweaty. It creates an allergic reaction that makes her itchy.  Please advise

## 2019-10-01 RX ORDER — BUPROPION HYDROCHLORIDE 150 MG/1
TABLET ORAL
Qty: 90 TABLET | Refills: 3 | Status: SHIPPED | OUTPATIENT
Start: 2019-10-01 | End: 2020-10-11

## 2019-10-17 ENCOUNTER — OFFICE VISIT (OUTPATIENT)
Dept: GASTROENTEROLOGY | Facility: CLINIC | Age: 64
End: 2019-10-17
Payer: COMMERCIAL

## 2019-10-17 ENCOUNTER — TELEPHONE (OUTPATIENT)
Dept: GASTROENTEROLOGY | Facility: CLINIC | Age: 64
End: 2019-10-17

## 2019-10-17 VITALS
BODY MASS INDEX: 24.66 KG/M2 | HEIGHT: 62 IN | WEIGHT: 134 LBS | DIASTOLIC BLOOD PRESSURE: 86 MMHG | HEART RATE: 70 BPM | SYSTOLIC BLOOD PRESSURE: 133 MMHG

## 2019-10-17 DIAGNOSIS — Z12.11 ENCOUNTER FOR SCREENING COLONOSCOPY: Primary | ICD-10-CM

## 2019-10-17 DIAGNOSIS — Z12.11 COLON CANCER SCREENING: Primary | ICD-10-CM

## 2019-10-17 PROCEDURE — S0285 CNSLT BEFORE SCREEN COLONOSC: HCPCS | Performed by: PHYSICIAN ASSISTANT

## 2019-10-17 NOTE — H&P
[facility] [facility] [address] Gastroenterology                                                                                                  Clinic History and Physical     Pa ETOH  - Denies illicit drug use   - Occupation: retired  ([employer])  - recently became a grandmother   - Lives with spouse     History, Medications, Allergies, ROS:      Past Medical History:   Diagnosis Date   • Anxiety state    • [employer] anc unknown type      Social History: Social History    Tobacco Use      Smoking status: Never Smoker      Smokeless tobacco: Never Used      Tobacco comment: No household smokers.      Alcohol use: Yes      Comment: 4 drinks weekly    Drug use: Never ACETONIDE 0.1 % External Cream, APPLY TOPICALLY 2 (TWO) TIMES DAILY. , Disp: 80 g, Rfl: 0  Fexofenadine HCl (ALLEGRA) 60 MG Oral Tab, Take  by mouth., Disp: , Rfl:         Allergies:    Sulfa Antibiotics       ANAPHYLAXIS    Comment:Other reaction(s): anaph carcinoma with Dr. Issac Rizzo, seen by Dr. Kyleigh Koroma of heme/onc), Ashkenazi Jehovah's witness ancestry, depression, HTN, migraines, first degree AV block, asthma and anxiety who had a CLN 10+ years ago (?OSH - no OP/PATH available to me) who presents for recall CLN evaluati types were placed in this encounter.       Meds This Visit:  Requested Prescriptions     Signed Prescriptions Disp Refills   • Na Sulfate-K Sulfate-Mg Sulf (SUPREP BOWEL PREP KIT) 17.5-3.13-1.6 GM/177ML Oral Solution 1 Bottle 0     Sig: Take as directed

## 2019-10-17 NOTE — PATIENT INSTRUCTIONS
1. Schedule colonoscopy with Dr. Reina Martinez with MAC sedation @ [address] [place] [address] or [facility] for [place].    2.  bowel prep from pharmacy - I have prescribed Suprep. This is a smaller volume preparation.  If this is too expensive, however, please call my office and we will

## 2019-10-17 NOTE — TELEPHONE ENCOUNTER
I contacted this patient to inform her that her procedure and arrival time has changed. She wrote down the new 2nd dosage times with verbal understanding.     Scheduled for:  Colonoscopy [address]7063  Provider Name: Dr. Kalpesh Augustin  Date:  1/8/20  Location:  [facility]

## 2019-11-09 ENCOUNTER — TELEPHONE (OUTPATIENT)
Dept: OTHER | Age: 64
End: 2019-11-09

## 2019-11-09 RX ORDER — HYDROCHLOROTHIAZIDE 12.5 MG/1
12.5 CAPSULE, GELATIN COATED ORAL DAILY
Qty: 90 CAPSULE | Refills: 1 | Status: SHIPPED | OUTPATIENT
Start: 2019-11-09 | End: 2020-05-09

## 2019-11-09 RX ORDER — LOSARTAN POTASSIUM 100 MG/1
100 TABLET ORAL DAILY
Qty: 90 TABLET | Refills: 1 | Status: SHIPPED | OUTPATIENT
Start: 2019-11-09 | End: 2020-05-10

## 2019-11-09 NOTE — TELEPHONE ENCOUNTER
Spoke with pt and informed her Dr [facility] message below, pt verbalized understanding.

## 2019-11-09 NOTE — TELEPHONE ENCOUNTER
Approved, please let patient know completed and understands to discontinue losartan/HCT when starting the individual medications.

## 2019-12-11 ENCOUNTER — HOSPITAL ENCOUNTER (OUTPATIENT)
Dept: MAMMOGRAPHY | Facility: HOSPITAL | Age: 64
Discharge: HOME | End: 2019-12-11
Attending: INTERNAL MEDICINE
Payer: COMMERCIAL

## 2019-12-11 DIAGNOSIS — C50.211 MALIGNANT NEOPLASM OF UPPER-INNER QUADRANT OF RIGHT BREAST IN FEMALE, ESTROGEN RECEPTOR POSITIVE (HCC): ICD-10-CM

## 2019-12-11 DIAGNOSIS — Z17.0 MALIGNANT NEOPLASM OF UPPER-INNER QUADRANT OF RIGHT BREAST IN FEMALE, ESTROGEN RECEPTOR POSITIVE (HCC): ICD-10-CM

## 2019-12-11 PROCEDURE — 77061 BREAST TOMOSYNTHESIS UNI: CPT | Performed by: INTERNAL MEDICINE

## 2019-12-11 PROCEDURE — 77065 DX MAMMO INCL CAD UNI: CPT | Performed by: INTERNAL MEDICINE

## 2019-12-16 ENCOUNTER — PATIENT MESSAGE (OUTPATIENT)
Dept: GASTROENTEROLOGY | Facility: CLINIC | Age: 64
End: 2019-12-16

## 2019-12-17 NOTE — TELEPHONE ENCOUNTER
From: Cedrick Dempsey  To: Rochell Primrose, PA-C  Sent: 12/16/2019 4:49 PM CST  Subject: Non-Urgent Medical Question    I cannot find the pre-colonoscopy instructions you and the nurse gave me. Is it possible to get another copy? Thank you.

## 2019-12-17 NOTE — TELEPHONE ENCOUNTER
[facility], [facility] Gastroenterology  MD Meli Camejo MD S. Samie Reusing, MD Nelida Foil, MD [address] Doren Ping, MD Stephan Cromer, MD    Colonoscopy Bowel Preparation Instructions  [x]Split Dose: Suprep, Moviprep  []Split nauseated or bloated when drinking, take a short break and walk around to help the liquid pass through your intestines. DAY OF COLONOSCOPY:    1. NO SOLID FOODS! 2. BOWEL PREP:  a. Complete SECOND DOSE/SECOND HALF of bowel prep.    b. START drinking the procedure. You [employer] use public transportation [facility]). The procedure(s) involves sedation, and you will not be allowed to leave unaccompanied. NO EXCEPTIONS.  Your procedure CANNOT be performed if you do not have a /adult to escort y you on the day of the procedure. Make sure clear liquids have calories and not labeled as “diet.

## 2020-01-08 ENCOUNTER — HOSPITAL ENCOUNTER (OUTPATIENT)
Age: 65
Setting detail: HOSPITAL OUTPATIENT SURGERY
Discharge: HOME | End: 2020-01-08
Attending: INTERNAL MEDICINE | Admitting: INTERNAL MEDICINE
Payer: COMMERCIAL

## 2020-01-08 ENCOUNTER — ANESTHESIA (OUTPATIENT)
Dept: ENDOSCOPY | Age: 65
End: 2020-01-08
Payer: COMMERCIAL

## 2020-01-08 ENCOUNTER — ANESTHESIA EVENT (OUTPATIENT)
Dept: ENDOSCOPY | Age: 65
End: 2020-01-08
Payer: COMMERCIAL

## 2020-01-08 DIAGNOSIS — Z12.11 COLON CANCER SCREENING: ICD-10-CM

## 2020-01-08 PROCEDURE — 45385 COLONOSCOPY W/LESION REMOVAL: CPT | Performed by: INTERNAL MEDICINE

## 2020-01-08 RX ORDER — SODIUM CHLORIDE, SODIUM LACTATE, POTASSIUM CHLORIDE, CALCIUM CHLORIDE 600; 310; 30; 20 MG/100ML; MG/100ML; MG/100ML; MG/100ML
INJECTION, SOLUTION INTRAVENOUS CONTINUOUS
Status: DISCONTINUED | OUTPATIENT
Start: 2020-01-08 | End: 2020-01-08

## 2020-01-08 NOTE — ANESTHESIA POSTPROCEDURE EVALUATION
Patient: Drew Salazar    Procedure Summary     Date:  01/08/20 Room / Location:  [facility] ENDOSCOPY 01 / [facility] ENDO    Anesthesia Start:  9426 Anesthesia Stop:  2260    Procedure:  COLONOSCOPY (N/A ) Diagnosis:       Colon cancer screening      (Polyp Skye Rosarioon

## 2020-01-08 NOTE — ANESTHESIA PREPROCEDURE EVALUATION
Anesthesia PreOp Note    HPI:     Ra Jaimes is a 62year old female who presents for preoperative consultation requested by: Michela Walker MD    Date of Surgery: 1/8/2020    Procedure(s):  COLONOSCOPY  Indication: Colon cancer screening    Relevant blood pressure    • Migraines    • Sinusitis        Past Surgical History:   Procedure Laterality Date   • BONE DENSITY TEST SCAN  2012    5/10/2012 normal bone mineral density   • BREAST BIOPSY Right 2012    [place]   • BREAST BIOPSY NEEDLE LOCALIZATION [facility] directed, Disp: 1 Bottle, Rfl: 0, 1/8/2020  FLOVENT  MCG/ACT Inhalation Aerosol, INHALE 2 PUFFS INTO THE LUNGS DAILY. , Disp: 1 Inhaler, Rfl: 0, 1/5/2020  letrozole 2.5 MG Oral Tab, Take 1 tablet (2.5 mg total) by mouth daily. , Disp: 30 tablet, Rfl: file      Food insecurity:        Worry: Not on file        Inability: Not on file      Transportation needs:        Medical: Not on file        Non-medical: Not on file    Tobacco Use      Smoking status: Never Smoker      Smokeless tobacco: Never Used (125 lb)    Height: 1.575 m (5' 2\")         Anesthesia Evaluation     Patient summary reviewed and Nursing notes reviewed    History of anesthetic complications   Airway   Mallampati: II  TM distance: >3 FB  Neck ROM: full  Dental      Pulmonary - normal

## 2020-01-08 NOTE — H&P
Pre Procedure History & Physical Examination    Patient Name: Alma Rosa Riggs  MRN: [id number]  CSN: [id number]  YOB: 1955    Diagnosis: screening colonoscopy, ?  Negative 10 years ago    losartan 100 MG Oral Tab, Take 1 tablet (100 mg total BEDTIME AS NEEDED, Disp: 30 tablet, Rfl: 0, Taking  Desonide 0.05 % External Ointment, APPLY A THIN LAYER TWICE A DAY FOR 3 DAYS, Disp: , Rfl: 0, 1/7/2020  TRIAMCINOLONE ACETONIDE 0.1 % External Cream, APPLY TOPICALLY 2 (TWO) TIMES DAILY. , Disp: 80 g, Rfl: NODE BIOPSY Right 5/6/2019    Performed by Mayra Ott MD at [address] [facility] MAIN OR   • RADIATION RIGHT     • SKIN SURGERY  2005    scalp cyst ex x 3   • SKIN SURGERY  1/16/2015    excision scalp cysts x2     Family History   Problem Relation Age of Onset   • Hyp evident. Neuro: Alert and interactive, and gross movements of extremities normal    I have discussed the risks and benefits and alternatives of the procedure with the patient/family. They understand and agree to proceed with plan of care.    I have revie

## 2020-01-08 NOTE — OPERATIVE REPORT
COLONOSCOPY REPORT    Gem Gamez     1955 Age 62year old   PCP Talita Patel MD Endoscopist Lucila Nowak MD     Date of procedure: 20    Procedure: Colonoscopy w/cold snare polypectomy    Pre-operative diagnosis: screening colonosco colonic mucosa throughout the colon showed normal vascular pattern, without evidence of angioectasias or inflammation. 6. CLAU: normal rectal tone, no masses palpated. Recommend:  · Await pathology.  The interval for the next colonoscopy will be dete

## 2020-01-09 VITALS
BODY MASS INDEX: 23 KG/M2 | WEIGHT: 125 LBS | OXYGEN SATURATION: 97 % | DIASTOLIC BLOOD PRESSURE: 67 MMHG | HEIGHT: 62 IN | RESPIRATION RATE: 19 BRPM | SYSTOLIC BLOOD PRESSURE: 119 MMHG | HEART RATE: 73 BPM

## 2020-01-10 RX ORDER — LETROZOLE 2.5 MG/1
TABLET, FILM COATED ORAL
Qty: 90 TABLET | Refills: 2 | Status: SHIPPED | OUTPATIENT
Start: 2020-01-10 | End: 2020-10-12

## 2020-01-13 ENCOUNTER — OFFICE VISIT (OUTPATIENT)
Dept: FAMILY MEDICINE CLINIC | Facility: CLINIC | Age: 65
End: 2020-01-13
Payer: COMMERCIAL

## 2020-01-13 VITALS
BODY MASS INDEX: 23 KG/M2 | WEIGHT: 125.19 LBS | TEMPERATURE: 98 F | HEART RATE: 78 BPM | RESPIRATION RATE: 18 BRPM | DIASTOLIC BLOOD PRESSURE: 70 MMHG | SYSTOLIC BLOOD PRESSURE: 113 MMHG

## 2020-01-13 DIAGNOSIS — L08.9 INFECTED EPIDERMOID CYST: Primary | ICD-10-CM

## 2020-01-13 DIAGNOSIS — L72.0 INFECTED EPIDERMOID CYST: Primary | ICD-10-CM

## 2020-01-13 PROCEDURE — 90471 IMMUNIZATION ADMIN: CPT | Performed by: FAMILY MEDICINE

## 2020-01-13 PROCEDURE — 10060 I&D ABSCESS SIMPLE/SINGLE: CPT | Performed by: FAMILY MEDICINE

## 2020-01-13 PROCEDURE — 99212 OFFICE O/P EST SF 10 MIN: CPT | Performed by: FAMILY MEDICINE

## 2020-01-13 PROCEDURE — 90686 IIV4 VACC NO PRSV 0.5 ML IM: CPT | Performed by: FAMILY MEDICINE

## 2020-01-13 RX ORDER — CEFADROXIL 500 MG/1
500 CAPSULE ORAL 2 TIMES DAILY
Qty: 14 CAPSULE | Refills: 0 | Status: SHIPPED | OUTPATIENT
Start: 2020-01-13 | End: 2020-01-20

## 2020-01-13 NOTE — PROGRESS NOTES
HPI:    Patient ID: Natacha Stern is a 62year old female. Vaginal Discharge   The patient's primary symptoms include vaginal discharge. This is a new problem. The current episode started 1 to 4 weeks ago.  Pertinent negatives include no chills or fe 3 DAYS  0   • TRIAMCINOLONE ACETONIDE 0.1 % External Cream APPLY TOPICALLY 2 (TWO) TIMES DAILY. 80 g 0   • Fexofenadine HCl (ALLEGRA) 60 MG Oral Tab Take  by mouth.        Allergies:  Sulfa Antibiotics       ANAPHYLAXIS    Comment:Other reaction(s): anaphla

## 2020-01-21 ENCOUNTER — OFFICE VISIT (OUTPATIENT)
Dept: HEMATOLOGY/ONCOLOGY | Facility: HOSPITAL | Age: 65
End: 2020-01-21
Attending: NURSE PRACTITIONER
Payer: COMMERCIAL

## 2020-01-21 VITALS
HEIGHT: 62 IN | WEIGHT: 127.81 LBS | OXYGEN SATURATION: 99 % | RESPIRATION RATE: 16 BRPM | DIASTOLIC BLOOD PRESSURE: 72 MMHG | TEMPERATURE: 98 F | SYSTOLIC BLOOD PRESSURE: 148 MMHG | HEART RATE: 69 BPM | BODY MASS INDEX: 23.52 KG/M2

## 2020-01-21 DIAGNOSIS — Z17.0 MALIGNANT NEOPLASM OF UPPER-INNER QUADRANT OF RIGHT BREAST IN FEMALE, ESTROGEN RECEPTOR POSITIVE (HCC): Primary | ICD-10-CM

## 2020-01-21 DIAGNOSIS — Z51.81 ENCOUNTER FOR MONITORING AROMATASE INHIBITOR THERAPY: ICD-10-CM

## 2020-01-21 DIAGNOSIS — C50.211 MALIGNANT NEOPLASM OF UPPER-INNER QUADRANT OF RIGHT BREAST IN FEMALE, ESTROGEN RECEPTOR POSITIVE (HCC): Primary | ICD-10-CM

## 2020-01-21 DIAGNOSIS — M85.89 OSTEOPENIA OF MULTIPLE SITES: ICD-10-CM

## 2020-01-21 DIAGNOSIS — Z79.811 ENCOUNTER FOR MONITORING AROMATASE INHIBITOR THERAPY: ICD-10-CM

## 2020-01-21 PROCEDURE — 99214 OFFICE O/P EST MOD 30 MIN: CPT | Performed by: INTERNAL MEDICINE

## 2020-01-21 NOTE — PROGRESS NOTES
GRISELDA       Elieser Hidalgo is a 62year old female who is here today for follow-up of diagnosis of Malignant neoplasm of upper-inner quadrant of right breast in female, estrogen receptor positive (hcc)  (primary encounter diagnosis)  Encounter for monit • hydrochlorothiazide 12.5 MG Oral Cap Take 1 capsule (12.5 mg total) by mouth daily.  90 capsule 1   • BUPROPION HCL ER, XL, 150 MG Oral Tablet 24 Hr TAKE 1 TABLET BY MOUTH EVERY DAY 90 tablet 3   • Flaxseed, Linseed, (FLAX SEED OIL) 1000 MG Oral Cap Take • Depression    • Diverticulosis of colon 2008   • DJD (degenerative joint disease) of hip 2010    DJD hips and lumbar. Physical therapy   • DJD (degenerative joint disease), lumbar 2010    DJD hips and lumbar.  Physical therapy   • Eczema    • Esophageal r /72 (BP Location: Left arm, Patient Position: Sitting, Cuff Size: adult)   Pulse 69   Temp 98.1 °F (36.7 °C) (Oral)   Resp 16   Ht 1.575 m (5' 2\")   Wt 58 kg (127 lb 12.8 oz)   LMP  (LMP Unknown)   SpO2 99%   BMI 23.37 kg/m²   Wt Readings from Last On adjuvant AI for 5 yrs until July of 2024. Mammogram of the right breast in December 2019 BI-RADS 3.  Bilateral mammograms in June 2020. Baseline DEXA prior to start of AI was performed on 7/11/2019.   This showed osteopenia in the femoral neck, the FINDINGS:        Postop changes are now noted. Surgical clips are also seen in the axilla. Benign-appearing calcifications are noted.   No suspicious abnormalities at this time.                 =====  CONCLUSION:       BI-RADS CATEGORY:     DIAGNOSTIC CAT

## 2020-01-22 LAB — 25(OH)D3 SERPL-MCNC: 54.5 NG/ML (ref 30–100)

## 2020-01-29 ENCOUNTER — TELEPHONE (OUTPATIENT)
Dept: GASTROENTEROLOGY | Facility: CLINIC | Age: 65
End: 2020-01-29

## 2020-01-29 NOTE — TELEPHONE ENCOUNTER
----- Message from Lucila Nowak MD sent at 1/29/2020  7:50 AM CST -----  GI staff: please place recall for colonoscopy in 5 years

## 2020-02-03 RX ORDER — MONTELUKAST SODIUM 10 MG/1
10 TABLET ORAL NIGHTLY
Qty: 90 TABLET | Refills: 1 | Status: SHIPPED | OUTPATIENT
Start: 2020-02-03 | End: 2020-08-13

## 2020-02-04 NOTE — TELEPHONE ENCOUNTER
Refill passed per [facility], [facility] protocol.   Refill Protocol Appointment Criteria  · Appointment scheduled in the past 6 months or in the next 3 months  Recent Outpatient Visits            1 week ago Malignant neoplasm of upper-inner quadrant of right minerva

## 2020-03-03 ENCOUNTER — OFFICE VISIT (OUTPATIENT)
Dept: FAMILY MEDICINE CLINIC | Facility: CLINIC | Age: 65
End: 2020-03-03
Payer: COMMERCIAL

## 2020-03-03 ENCOUNTER — LAB ENCOUNTER (OUTPATIENT)
Dept: LAB | Age: 65
End: 2020-03-03
Attending: FAMILY MEDICINE
Payer: COMMERCIAL

## 2020-03-03 VITALS
WEIGHT: 124.13 LBS | BODY MASS INDEX: 22.84 KG/M2 | DIASTOLIC BLOOD PRESSURE: 82 MMHG | SYSTOLIC BLOOD PRESSURE: 130 MMHG | HEART RATE: 74 BPM | HEIGHT: 62 IN | TEMPERATURE: 98 F

## 2020-03-03 DIAGNOSIS — K21.9 GASTROESOPHAGEAL REFLUX DISEASE WITHOUT ESOPHAGITIS: ICD-10-CM

## 2020-03-03 DIAGNOSIS — Z00.00 ROUTINE PHYSICAL EXAMINATION: Primary | ICD-10-CM

## 2020-03-03 DIAGNOSIS — C50.211 MALIGNANT NEOPLASM OF UPPER-INNER QUADRANT OF RIGHT BREAST IN FEMALE, ESTROGEN RECEPTOR POSITIVE (HCC): ICD-10-CM

## 2020-03-03 DIAGNOSIS — Z00.00 ROUTINE GENERAL MEDICAL EXAMINATION AT A HEALTH CARE FACILITY: Primary | ICD-10-CM

## 2020-03-03 DIAGNOSIS — Z17.0 MALIGNANT NEOPLASM OF UPPER-INNER QUADRANT OF RIGHT BREAST IN FEMALE, ESTROGEN RECEPTOR POSITIVE (HCC): ICD-10-CM

## 2020-03-03 DIAGNOSIS — J45.40 MODERATE PERSISTENT ASTHMA WITHOUT COMPLICATION: ICD-10-CM

## 2020-03-03 DIAGNOSIS — L30.9 ECZEMA, UNSPECIFIED TYPE: ICD-10-CM

## 2020-03-03 DIAGNOSIS — I10 ESSENTIAL HYPERTENSION: ICD-10-CM

## 2020-03-03 PROBLEM — Z86.010 HISTORY OF COLON POLYPS: Status: ACTIVE | Noted: 2020-03-03

## 2020-03-03 PROBLEM — Z86.0100 HISTORY OF COLON POLYPS: Status: ACTIVE | Noted: 2020-03-03

## 2020-03-03 PROCEDURE — 99396 PREV VISIT EST AGE 40-64: CPT | Performed by: FAMILY MEDICINE

## 2020-03-03 PROCEDURE — 36415 COLL VENOUS BLD VENIPUNCTURE: CPT

## 2020-03-03 RX ORDER — ZOLPIDEM TARTRATE 12.5 MG/1
TABLET, FILM COATED, EXTENDED RELEASE ORAL
Qty: 30 TABLET | Refills: 0 | Status: SHIPPED | OUTPATIENT
Start: 2020-03-03 | End: 2021-06-03

## 2020-03-03 RX ORDER — AZELASTINE 1 MG/ML
2 SPRAY, METERED NASAL 2 TIMES DAILY
Qty: 1 BOTTLE | Refills: 3 | Status: SHIPPED | OUTPATIENT
Start: 2020-03-03 | End: 2020-05-29

## 2020-03-03 NOTE — PROGRESS NOTES
HPI:    Patient ID: Marco Antonio Shen is a 62year old female. HPI    Review of Systems   Constitutional: Negative. HENT: Positive for congestion, postnasal drip and sinus pressure. Respiratory: Negative. Cardiovascular: Negative.     Desean اللعي 80 g 0   • Fexofenadine HCl (ALLEGRA) 60 MG Oral Tab Take  by mouth.      • ZOLPIDEM TARTRATE ER 12.5 MG Oral Tab CR TAKE 1 TABLET BY MOUTH AT BEDTIME AS NEEDED (Patient not taking: Reported on 3/3/2020) 30 tablet 0     Allergies:  Sulfa Antibiotics       A currently taking letrozole with mild nocturnal hot flashes. Moderate persistent asthma without complication–and severe allergic rhinitis. Had to discontinue Flonase due to her corneal tears.   Will try Astelin nasal spray and continue Allegra, Singulair

## 2020-03-04 DIAGNOSIS — I10 ESSENTIAL HYPERTENSION: Primary | ICD-10-CM

## 2020-03-04 LAB
BUN: 18 MG/DL (ref 7–25)
CALCIUM: 9.3 MG/DL (ref 8.6–10.4)
CARBON DIOXIDE: 28 MMOL/L (ref 20–32)
CHLORIDE: 105 MMOL/L (ref 98–110)
CHOL/HDLC RATIO: 3.7 (CALC)
CHOLESTEROL, TOTAL: 222 MG/DL
CREATININE: 0.73 MG/DL (ref 0.5–0.99)
EGFR IF AFRICN AM: 102 ML/MIN/1.73M2
EGFR IF NONAFRICN AM: 88 ML/MIN/1.73M2
GLUCOSE: 131 MG/DL (ref 65–99)
HDL CHOLESTEROL: 60 MG/DL
LDL-CHOLESTEROL: 125 MG/DL (CALC)
NON-HDL CHOLESTEROL: 162 MG/DL (CALC)
POTASSIUM: 3.7 MMOL/L (ref 3.5–5.3)
SODIUM: 140 MMOL/L (ref 135–146)
TRIGLYCERIDES: 229 MG/DL

## 2020-03-26 ENCOUNTER — NURSE TRIAGE (OUTPATIENT)
Dept: FAMILY MEDICINE CLINIC | Facility: CLINIC | Age: 65
End: 2020-03-26

## 2020-03-26 NOTE — TELEPHONE ENCOUNTER
Action Requested: Summary for Provider     []  Critical Lab, Recommendations Needed  [] Need Additional Advice  []   FYI    []   Need Orders  [] Need Medications Sent to Pharmacy  []  Other     SUMMARY: Dr. Bertram Sagastume,   Please advise on other medications she

## 2020-03-27 NOTE — TELEPHONE ENCOUNTER
Patient contacted. No purulent postnasal drip or focal sinus infection. Will try Flonase daily, if not improved will consider trial of antibiotic. In the meantime continuing Astelin, Allegra, Singulair.

## 2020-04-23 DIAGNOSIS — J45.41 MODERATE PERSISTENT ASTHMA WITH ACUTE EXACERBATION: ICD-10-CM

## 2020-04-24 RX ORDER — ALBUTEROL SULFATE 90 UG/1
2 AEROSOL, METERED RESPIRATORY (INHALATION) 4 TIMES DAILY PRN
Qty: 1 INHALER | Refills: 2 | Status: SHIPPED | OUTPATIENT
Start: 2020-04-24

## 2020-05-09 RX ORDER — HYDROCHLOROTHIAZIDE 12.5 MG/1
CAPSULE, GELATIN COATED ORAL
Qty: 90 CAPSULE | Refills: 1 | Status: SHIPPED | OUTPATIENT
Start: 2020-05-09 | End: 2020-05-18

## 2020-05-10 RX ORDER — LOSARTAN POTASSIUM 100 MG/1
TABLET ORAL
Qty: 90 TABLET | Refills: 1 | Status: SHIPPED | OUTPATIENT
Start: 2020-05-10 | End: 2020-05-18

## 2020-05-18 ENCOUNTER — TELEPHONE (OUTPATIENT)
Dept: FAMILY MEDICINE CLINIC | Facility: CLINIC | Age: 65
End: 2020-05-18

## 2020-05-18 RX ORDER — LOSARTAN POTASSIUM AND HYDROCHLOROTHIAZIDE 12.5; 1 MG/1; MG/1
1 TABLET ORAL DAILY
Qty: 90 TABLET | Refills: 3 | Status: SHIPPED | OUTPATIENT
Start: 2020-05-18 | End: 2021-05-14

## 2020-05-18 NOTE — TELEPHONE ENCOUNTER
Please let patient know new Rx sent to pharmacy for losartan/HCT combo be sure she is straight on discontinuing plain losartan, plain hydrochlorothiazide.

## 2020-05-18 NOTE — TELEPHONE ENCOUNTER
Per [facility], Losartan Potassium 100mg tablet is on backorder, Losartan/HCTZ is back in stock. Please send a new Rx for the combo.

## 2020-05-19 NOTE — TELEPHONE ENCOUNTER
Pt called back advised of message from [facility]. Verbalized an understanding to discontinue plain losartan and plain HCTZ. She understands that once she picks up Losartan/HCTZ combined to only take that pill as directed. No further action needed.

## 2020-05-29 RX ORDER — AZELASTINE 1 MG/ML
SPRAY, METERED NASAL
Qty: 3 BOTTLE | Refills: 1 | Status: SHIPPED | OUTPATIENT
Start: 2020-05-29

## 2020-06-08 RX ORDER — FLUTICASONE PROPIONATE 220 UG/1
2 AEROSOL, METERED RESPIRATORY (INHALATION) DAILY
Qty: 1 INHALER | Refills: 3 | Status: SHIPPED | OUTPATIENT
Start: 2020-06-08 | End: 2021-03-22

## 2020-07-21 ENCOUNTER — OFFICE VISIT (OUTPATIENT)
Dept: HEMATOLOGY/ONCOLOGY | Facility: HOSPITAL | Age: 65
End: 2020-07-21
Attending: NURSE PRACTITIONER
Payer: COMMERCIAL

## 2020-07-21 VITALS
RESPIRATION RATE: 16 BRPM | HEIGHT: 62 IN | HEART RATE: 76 BPM | BODY MASS INDEX: 23.67 KG/M2 | SYSTOLIC BLOOD PRESSURE: 119 MMHG | TEMPERATURE: 98 F | WEIGHT: 128.63 LBS | DIASTOLIC BLOOD PRESSURE: 69 MMHG | OXYGEN SATURATION: 99 %

## 2020-07-21 DIAGNOSIS — Z17.0 MALIGNANT NEOPLASM OF UPPER-INNER QUADRANT OF RIGHT BREAST IN FEMALE, ESTROGEN RECEPTOR POSITIVE (HCC): Primary | ICD-10-CM

## 2020-07-21 DIAGNOSIS — Z51.81 ENCOUNTER FOR MONITORING AROMATASE INHIBITOR THERAPY: ICD-10-CM

## 2020-07-21 DIAGNOSIS — C50.211 MALIGNANT NEOPLASM OF UPPER-INNER QUADRANT OF RIGHT BREAST IN FEMALE, ESTROGEN RECEPTOR POSITIVE (HCC): Primary | ICD-10-CM

## 2020-07-21 DIAGNOSIS — Z79.811 ENCOUNTER FOR MONITORING AROMATASE INHIBITOR THERAPY: ICD-10-CM

## 2020-07-21 PROCEDURE — 99214 OFFICE O/P EST MOD 30 MIN: CPT | Performed by: INTERNAL MEDICINE

## 2020-07-21 NOTE — PROGRESS NOTES
HPI       Marco Antonio Shen is a 62year old female who is here today for follow-up of diagnosis of Malignant neoplasm of upper-inner quadrant of right breast in female, estrogen receptor positive (hcc)  (primary encounter diagnosis)  Encounter for monit • Fluticasone Propionate HFA (FLOVENT HFA) 220 MCG/ACT Inhalation Aerosol Inhale 2 puffs into the lungs daily.  1 Inhaler 3   • AZELASTINE HCL 0.1 % Nasal Solution INSTILL 2 SPRAYS PER NOSTRIL TWICE A DAY 3 Bottle 1   • Losartan Potassium-HCTZ (HYZAAR) 100- • Ashkenazi [employer]ology ancestry requiring population-specific genetic screening    • Asthma    • Atypical lobular hyperplasia of breast    • Depression    • Diverticulosis of colon 2008   • DJD (degenerative joint disease) of hip 2010    DJD hips and lumbar.  P slow progressing prostate   • Cancer Maternal Aunt         unknown primary   • Cancer Paternal Uncle 80        unknown type   • Breast Cancer Self 59         PHYSICAL EXAM:    /69 (BP Location: Right arm, Patient Position: Sitting, Cuff Size: ad Patient has completed breast conservation with a lumpectomy on 5/6/2019 and completed adjuvant radiation therapy on 6/28/2019. On adjuvant AI for 5 yrs until July of 2024.     Mammogram of the right breast in December 2019 BI-RADS 3.  Bilateral mammogram FINDINGS:        Postop changes are now noted. Surgical clips are also seen in the axilla. Benign-appearing calcifications are noted.   No suspicious abnormalities at this time.                 =====  CONCLUSION:       BI-RADS CATEGORY:     DIAGNOSTIC CAT

## 2020-07-27 ENCOUNTER — HOSPITAL ENCOUNTER (OUTPATIENT)
Dept: MAMMOGRAPHY | Facility: HOSPITAL | Age: 65
Discharge: HOME | End: 2020-07-27
Attending: INTERNAL MEDICINE
Payer: COMMERCIAL

## 2020-07-27 DIAGNOSIS — C50.211 MALIGNANT NEOPLASM OF UPPER-INNER QUADRANT OF RIGHT BREAST IN FEMALE, ESTROGEN RECEPTOR POSITIVE (HCC): ICD-10-CM

## 2020-07-27 DIAGNOSIS — Z17.0 MALIGNANT NEOPLASM OF UPPER-INNER QUADRANT OF RIGHT BREAST IN FEMALE, ESTROGEN RECEPTOR POSITIVE (HCC): ICD-10-CM

## 2020-07-27 PROCEDURE — 77066 DX MAMMO INCL CAD BI: CPT | Performed by: INTERNAL MEDICINE

## 2020-07-27 PROCEDURE — 77062 BREAST TOMOSYNTHESIS BI: CPT | Performed by: INTERNAL MEDICINE

## 2020-08-13 RX ORDER — MONTELUKAST SODIUM 10 MG/1
10 TABLET ORAL NIGHTLY
Qty: 90 TABLET | Refills: 3 | Status: SHIPPED | OUTPATIENT
Start: 2020-08-13 | End: 2021-08-06

## 2020-10-07 ENCOUNTER — OFFICE VISIT (OUTPATIENT)
Dept: HEMATOLOGY/ONCOLOGY | Facility: HOSPITAL | Age: 65
End: 2020-10-07
Attending: NURSE PRACTITIONER
Payer: COMMERCIAL

## 2020-10-07 ENCOUNTER — TELEPHONE (OUTPATIENT)
Dept: HEMATOLOGY/ONCOLOGY | Facility: HOSPITAL | Age: 65
End: 2020-10-07

## 2020-10-07 VITALS
BODY MASS INDEX: 23.92 KG/M2 | OXYGEN SATURATION: 97 % | WEIGHT: 130 LBS | TEMPERATURE: 98 F | HEIGHT: 62 IN | SYSTOLIC BLOOD PRESSURE: 129 MMHG | HEART RATE: 82 BPM | DIASTOLIC BLOOD PRESSURE: 55 MMHG | RESPIRATION RATE: 16 BRPM

## 2020-10-07 DIAGNOSIS — R59.0 LYMPHADENOPATHY, AXILLARY: ICD-10-CM

## 2020-10-07 DIAGNOSIS — C50.211 MALIGNANT NEOPLASM OF UPPER-INNER QUADRANT OF RIGHT BREAST IN FEMALE, ESTROGEN RECEPTOR POSITIVE (HCC): Primary | ICD-10-CM

## 2020-10-07 DIAGNOSIS — Z17.0 MALIGNANT NEOPLASM OF UPPER-INNER QUADRANT OF RIGHT BREAST IN FEMALE, ESTROGEN RECEPTOR POSITIVE (HCC): Primary | ICD-10-CM

## 2020-10-07 PROCEDURE — 99213 OFFICE O/P EST LOW 20 MIN: CPT | Performed by: NURSE PRACTITIONER

## 2020-10-07 NOTE — TELEPHONE ENCOUNTER
Breast Cancer - Letrozole    Patient noted a lump in right arm pit getting larger over last two days, she can see it in a mirror, no redness, tender on palpation.   Denies fevers, has allergies, no cough, sob or chest pain, no h/a or achiness, no know Covid

## 2020-10-07 NOTE — PROGRESS NOTES
GRISELDA       Pamula Alpers is a 60year old female who is here today for follow-up of diagnosis of Malignant neoplasm of upper-inner quadrant of right breast in female, estrogen receptor positive (hcc)  (primary encounter diagnosis)       The patient co sleep disturbance (due to sweating at night.). Current Outpatient Medications   Medication Sig Dispense Refill   • MONTELUKAST SODIUM 10 MG Oral Tab TAKE 1 TABLET (10 MG TOTAL) BY MOUTH NIGHTLY.  AT BEDTIME 90 tablet 3   • Fluticasone Propionate HFA breast surgery    Past Medical History:   Diagnosis Date   • Anxiety state    • Ashkenazi [employer] ancestry requiring population-specific genetic screening    • Asthma    • Atypical lobular hyperplasia of breast    • Depression    • Diverticulosis of colon 2 Diabetes Mother    • Allergies Daughter    • Allergies Sister    • Cancer Brother 68        slow progressing prostate   • Cancer Maternal Aunt         unknown primary   • Cancer Paternal Uncle 80        unknown type   • Breast Cancer Self 59   • Ovarian Ca (pT1a, pN0(sn), cM0, G1, ER+, DC+, HER2-) - Signed by Clementine Felton MD on 5/14/2019      Patient has completed breast conservation with a lumpectomy on 5/6/2019 and completed adjuvant radiation therapy on 6/28/2019.     On adjuvant AI for 5 yrs until July Malignant neoplasm of upper-inner quadrant of right female breast     TECHNIQUE:    Digital diagnostic mammography was performed and images were reviewed with the R2 ANA [de-identified] CAD device. 3D tomosynthesis was performed and reviewed.          BREAST COMPO

## 2020-10-11 RX ORDER — BUPROPION HYDROCHLORIDE 150 MG/1
TABLET ORAL
Qty: 90 TABLET | Refills: 3 | Status: SHIPPED | OUTPATIENT
Start: 2020-10-11 | End: 2021-10-11

## 2020-10-12 RX ORDER — LETROZOLE 2.5 MG/1
TABLET, FILM COATED ORAL
Qty: 90 TABLET | Refills: 2 | Status: SHIPPED | OUTPATIENT
Start: 2020-10-12 | End: 2021-07-15

## 2020-10-14 ENCOUNTER — TELEPHONE (OUTPATIENT)
Dept: HEMATOLOGY/ONCOLOGY | Facility: HOSPITAL | Age: 65
End: 2020-10-14

## 2020-10-14 NOTE — TELEPHONE ENCOUNTER
Called patient and left message. Inquiring how she is feeling and if she is interested in pursuing US of axilla. Left message to call back.

## 2020-11-27 ENCOUNTER — TELEPHONE (OUTPATIENT)
Dept: HEMATOLOGY/ONCOLOGY | Facility: HOSPITAL | Age: 65
End: 2020-11-27

## 2020-11-27 RX ORDER — GABAPENTIN 300 MG/1
300 CAPSULE ORAL 3 TIMES DAILY
Qty: 90 CAPSULE | Refills: 1 | Status: SHIPPED
Start: 2020-11-27 | End: 2020-11-27

## 2020-11-27 RX ORDER — GABAPENTIN 300 MG/1
300 CAPSULE ORAL NIGHTLY
Qty: 30 CAPSULE | Refills: 1 | Status: SHIPPED | OUTPATIENT
Start: 2020-11-27 | End: 2021-01-25

## 2020-11-27 NOTE — TELEPHONE ENCOUNTER
Called and spoke with Cici Zepeda she had forgot to tell us she was already on Flax Seed oil 3000 mg per day with no relief.

## 2020-11-27 NOTE — TELEPHONE ENCOUNTER
Discussed with Dr Vin Callaway-   Have patient start with flax seed oil 1000mg 3 tabs per day and see if that would help. We don't want to start with gabapentin due to some of her other meds.  Please call patient - Thanks

## 2020-11-27 NOTE — TELEPHONE ENCOUNTER
Breast Cancer - Letrozole    Thai Jody called she is having trouble sleeping due to night sweats and that has worsen her eczema. She had been reading about Gabapentin helping with both of those symptoms, could that be helpful for her. Please advise.

## 2020-11-27 NOTE — TELEPHONE ENCOUNTER
Called pt and let her know Rx sent for gabapentin, take 1 at hs- may make her sleepy. Computer checked for possible drug interactions- no issues identified. No further questions.

## 2020-12-04 ENCOUNTER — OFFICE VISIT (OUTPATIENT)
Dept: INTEGRATIVE MEDICINE | Facility: CLINIC | Age: 65
End: 2020-12-04
Payer: COMMERCIAL

## 2020-12-04 DIAGNOSIS — J45.40 MODERATE PERSISTENT ASTHMA WITHOUT COMPLICATION: Chronic | ICD-10-CM

## 2020-12-04 PROCEDURE — 97810 ACUP 1/> WO ESTIM 1ST 15 MIN: CPT | Performed by: ACUPUNCTURIST

## 2020-12-04 PROCEDURE — 97811 ACUP 1/> W/O ESTIM EA ADD 15: CPT | Performed by: ACUPUNCTURIST

## 2020-12-04 NOTE — PROGRESS NOTES
Kraen Tilley is a 60year old female Acupuncture Therapy. Complaints:  1. Eczema  2. Allergies  3. Asthma     Initial Intake: Patient reports severe eczema that spreads through the front of the body inside of the legs LV/SP, fingers especially the inde

## 2020-12-11 ENCOUNTER — OFFICE VISIT (OUTPATIENT)
Dept: INTEGRATIVE MEDICINE | Facility: CLINIC | Age: 65
End: 2020-12-11
Payer: COMMERCIAL

## 2020-12-11 DIAGNOSIS — R51.9 FRONTAL HEADACHE: Primary | ICD-10-CM

## 2020-12-11 PROCEDURE — 97811 ACUP 1/> W/O ESTIM EA ADD 15: CPT | Performed by: ACUPUNCTURIST

## 2020-12-11 PROCEDURE — 97810 ACUP 1/> WO ESTIM 1ST 15 MIN: CPT | Performed by: ACUPUNCTURIST

## 2020-12-11 NOTE — PROGRESS NOTES
Bia Londono is a 60year old female Acupuncture Therapy. Complaints:  1. Frontal HA associated with Allergies  2. Skin irritation associated with eczema  3.  SOB associated with Asthma     Initial Intake: Patient reports severe eczema that spreads t

## 2020-12-17 ENCOUNTER — OFFICE VISIT (OUTPATIENT)
Dept: INTEGRATIVE MEDICINE | Facility: CLINIC | Age: 65
End: 2020-12-17
Payer: COMMERCIAL

## 2020-12-17 DIAGNOSIS — R51.9 FRONTAL HEADACHE: Primary | ICD-10-CM

## 2020-12-17 PROCEDURE — 97811 ACUP 1/> W/O ESTIM EA ADD 15: CPT | Performed by: ACUPUNCTURIST

## 2020-12-17 PROCEDURE — 97810 ACUP 1/> WO ESTIM 1ST 15 MIN: CPT | Performed by: ACUPUNCTURIST

## 2020-12-17 NOTE — PROGRESS NOTES
Baron Bender is a 60year old female Acupuncture Therapy. Complaints:  1. Frontal HA associated with nasal congestion  2. Skin irritation associated with eczema  3.  SOB associated with Asthma     Initial Intake: Patient reports severe eczema that sp

## 2021-01-21 ENCOUNTER — OFFICE VISIT (OUTPATIENT)
Dept: HEMATOLOGY/ONCOLOGY | Facility: HOSPITAL | Age: 66
End: 2021-01-21
Attending: NURSE PRACTITIONER
Payer: COMMERCIAL

## 2021-01-21 ENCOUNTER — PATIENT MESSAGE (OUTPATIENT)
Dept: FAMILY MEDICINE CLINIC | Facility: CLINIC | Age: 66
End: 2021-01-21

## 2021-01-21 ENCOUNTER — TELEPHONE (OUTPATIENT)
Dept: INTERNAL MEDICINE CLINIC | Facility: CLINIC | Age: 66
End: 2021-01-21

## 2021-01-21 VITALS
HEIGHT: 62 IN | WEIGHT: 138 LBS | BODY MASS INDEX: 25.4 KG/M2 | OXYGEN SATURATION: 98 % | TEMPERATURE: 99 F | SYSTOLIC BLOOD PRESSURE: 153 MMHG | RESPIRATION RATE: 16 BRPM | HEART RATE: 74 BPM | DIASTOLIC BLOOD PRESSURE: 79 MMHG

## 2021-01-21 DIAGNOSIS — Z12.31 SCREENING MAMMOGRAM, ENCOUNTER FOR: ICD-10-CM

## 2021-01-21 DIAGNOSIS — C50.211 MALIGNANT NEOPLASM OF UPPER-INNER QUADRANT OF RIGHT BREAST IN FEMALE, ESTROGEN RECEPTOR POSITIVE (HCC): Primary | ICD-10-CM

## 2021-01-21 DIAGNOSIS — Z17.0 MALIGNANT NEOPLASM OF UPPER-INNER QUADRANT OF RIGHT BREAST IN FEMALE, ESTROGEN RECEPTOR POSITIVE (HCC): Primary | ICD-10-CM

## 2021-01-21 DIAGNOSIS — Z79.811 ENCOUNTER FOR MONITORING AROMATASE INHIBITOR THERAPY: ICD-10-CM

## 2021-01-21 DIAGNOSIS — Z51.81 ENCOUNTER FOR MONITORING AROMATASE INHIBITOR THERAPY: ICD-10-CM

## 2021-01-21 DIAGNOSIS — R59.0 ENLARGED LYMPH NODES IN ARMPIT: ICD-10-CM

## 2021-01-21 DIAGNOSIS — N60.99 ATYPICAL LOBULAR HYPERPLASIA OF BREAST: ICD-10-CM

## 2021-01-21 DIAGNOSIS — M85.89 OSTEOPENIA OF MULTIPLE SITES: ICD-10-CM

## 2021-01-21 PROCEDURE — 99214 OFFICE O/P EST MOD 30 MIN: CPT | Performed by: INTERNAL MEDICINE

## 2021-01-21 NOTE — TELEPHONE ENCOUNTER
Pt states has pain in right thumb. Pt reports some weakness and denies numbness/tingling. Pt states \"my thumb clicks into two positions only. Pt not aware that she injured thumb. Advised OV and pt agrees to plan.     OV scheduled 1/23/21 for f

## 2021-01-21 NOTE — TELEPHONE ENCOUNTER
----- Message from Clark Gordon sent at 1/21/2021  3:36 PM CST -----  Regarding: Non-Urgent Medical Question  Contact: [phone]  I have been having increasing pain in my thumb joint. Do I need to see a doctor or do I need physical therapy?   Thank

## 2021-01-21 NOTE — TELEPHONE ENCOUNTER
From: Nery Tai  To: Talita Herrera MD  Sent: 1/21/2021 3:36 PM CST  Subject: Non-Urgent Medical Question    I have been having increasing pain in my thumb joint. Do I need to see a doctor or do I need physical therapy?   Thank you  Serina Hsu  [phone]

## 2021-01-21 NOTE — PROGRESS NOTES
GRISELDA       Zina Meigs is a 60year old female who is here today for follow-up of diagnosis of Malignant neoplasm of upper-inner quadrant of right breast in female, estrogen receptor positive (hcc)  (primary encounter diagnosis)  Encounter for monit 10 MG Oral Tab TAKE 1 TABLET (10 MG TOTAL) BY MOUTH NIGHTLY. AT BEDTIME 90 tablet 3   • Fluticasone Propionate HFA (FLOVENT HFA) 220 MCG/ACT Inhalation Aerosol Inhale 2 puffs into the lungs daily.  1 Inhaler 3   • AZELASTINE HCL 0.1 % Nasal Solution INSTILL Esophageal reflux    • Essential hypertension 2017    controlled with Rx   • Exposure to medical diagnostic radiation    • Extrinsic asthma, unspecified    • First degree AV block 2013   • High blood pressure    • Malignant neoplasm of upper-inner quadrant 62.6 kg (138 lb)   LMP  (LMP Unknown)   SpO2 98%   BMI 25.24 kg/m²   Wt Readings from Last 6 Encounters:  01/21/21 : 62.6 kg (138 lb)  10/07/20 : 59 kg (130 lb)  07/21/20 : 58.3 kg (128 lb 9.6 oz)  03/03/20 : 56.3 kg (124 lb 2 oz)  01/21/20 : 58 kg (127 lb This showed osteopenia in the femoral neck, the hips and lumbar spine were normal.  Patient with normal vitamin D level in January 2020. She is encouraged to continue with supplementation of vitamin D and calcium. Recommend weightbearing exercise.   She w

## 2021-01-23 ENCOUNTER — OFFICE VISIT (OUTPATIENT)
Dept: FAMILY MEDICINE CLINIC | Facility: CLINIC | Age: 66
End: 2021-01-23
Payer: COMMERCIAL

## 2021-01-23 VITALS
DIASTOLIC BLOOD PRESSURE: 68 MMHG | WEIGHT: 135.19 LBS | SYSTOLIC BLOOD PRESSURE: 102 MMHG | TEMPERATURE: 97 F | BODY MASS INDEX: 24.88 KG/M2 | RESPIRATION RATE: 18 BRPM | HEIGHT: 62 IN | HEART RATE: 98 BPM

## 2021-01-23 DIAGNOSIS — M65.312 TRIGGER FINGER OF LEFT THUMB: ICD-10-CM

## 2021-01-23 DIAGNOSIS — M19.049 CMC ARTHRITIS: Primary | ICD-10-CM

## 2021-01-23 DIAGNOSIS — R10.30 LOWER ABDOMINAL PAIN: ICD-10-CM

## 2021-01-23 LAB
APPEARANCE: YELLOW
BILIRUBIN: NEGATIVE
GLUCOSE (URINE DIPSTICK): NEGATIVE MG/DL
KETONES (URINE DIPSTICK): NEGATIVE MG/DL
LEUKOCYTES: NEGATIVE
MULTISTIX LOT#: 1044 NUMERIC
NITRITE, URINE: NEGATIVE
OCCULT BLOOD: NEGATIVE
PH, URINE: 6.5 (ref 4.5–8)
PROTEIN (URINE DIPSTICK): NEGATIVE MG/DL
SPECIFIC GRAVITY: 1.01 (ref 1–1.03)
URINE-COLOR: CLEAR

## 2021-01-23 PROCEDURE — 99214 OFFICE O/P EST MOD 30 MIN: CPT | Performed by: FAMILY MEDICINE

## 2021-01-23 PROCEDURE — 3008F BODY MASS INDEX DOCD: CPT | Performed by: FAMILY MEDICINE

## 2021-01-23 PROCEDURE — 81002 URINALYSIS NONAUTO W/O SCOPE: CPT | Performed by: FAMILY MEDICINE

## 2021-01-23 PROCEDURE — 3074F SYST BP LT 130 MM HG: CPT | Performed by: FAMILY MEDICINE

## 2021-01-23 PROCEDURE — 3078F DIAST BP <80 MM HG: CPT | Performed by: FAMILY MEDICINE

## 2021-01-23 RX ORDER — MELOXICAM 15 MG/1
15 TABLET ORAL DAILY
Qty: 30 TABLET | Refills: 1 | Status: SHIPPED | OUTPATIENT
Start: 2021-01-23

## 2021-01-23 NOTE — PROGRESS NOTES
HPI:    Patient ID: Drew Salazar is a 60year old female. Hand Pain   This is a new problem. The current episode started 1 to 4 weeks ago. There has been no history of extremity trauma. The problem occurs intermittently.  The problem has been [place] Linseed, (FLAX SEED OIL) 1000 MG Oral Cap Take by mouth 3 (three) times daily. • Calcium Carbonate-Vitamin D (CALTRATE 600+D OR) Take by mouth daily.      • Tacrolimus 0.03 % External Ointment BENI TO FACIAL RASH BID  0   • TRIAMCINOLONE ACETONIDE 0.1 % had did have diverticuli on colonoscopy, but has not had diverticulitis. Mild left lower quadrant pain without rebound or guarding. Check urinalysis and culture. Recommend bowel rest and monitor.   She will call if worsening, consider empiric treatment f

## 2021-01-24 ENCOUNTER — TELEPHONE (OUTPATIENT)
Dept: FAMILY MEDICINE CLINIC | Facility: CLINIC | Age: 66
End: 2021-01-24

## 2021-01-24 RX ORDER — METRONIDAZOLE 500 MG/1
500 TABLET ORAL 2 TIMES DAILY
Qty: 14 TABLET | Refills: 0 | Status: SHIPPED | OUTPATIENT
Start: 2021-01-24 | End: 2021-01-31

## 2021-01-24 RX ORDER — CIPROFLOXACIN 500 MG/1
500 TABLET, FILM COATED ORAL 2 TIMES DAILY
Qty: 14 TABLET | Refills: 0 | Status: SHIPPED | OUTPATIENT
Start: 2021-01-24 | End: 2021-01-31

## 2021-01-24 NOTE — TELEPHONE ENCOUNTER
On-call page–patient seen in office yesterday for hand pain but was experiencing lower abdominal discomfort. Exam suspicious for diverticulitis. Today pain is worsening. No nausea or vomiting. Pain bilateral lower abdomen left greater than right.   Woul

## 2021-01-25 ENCOUNTER — TELEPHONE (OUTPATIENT)
Dept: FAMILY MEDICINE CLINIC | Facility: CLINIC | Age: 66
End: 2021-01-25

## 2021-01-25 RX ORDER — GABAPENTIN 300 MG/1
CAPSULE ORAL
Qty: 30 CAPSULE | Refills: 1 | Status: SHIPPED | OUTPATIENT
Start: 2021-01-25 | End: 2021-04-16

## 2021-01-25 NOTE — TELEPHONE ENCOUNTER
Message # [phone]         2021 07:21p   [EPHRAIM]  To:  From:  VIVIEN Jean-Baptiste MD:  Phone#:  ----------------------------------------------------------------------  Ceasar GUY 57, [phone], FEVER Paged at number :  PAGE: [phone] at : [facility]

## 2021-02-22 ENCOUNTER — OFFICE VISIT (OUTPATIENT)
Dept: OCCUPATIONAL MEDICINE | Facility: HOSPITAL | Age: 66
End: 2021-02-22
Attending: FAMILY MEDICINE
Payer: COMMERCIAL

## 2021-02-22 DIAGNOSIS — M19.049 CMC ARTHRITIS: ICD-10-CM

## 2021-02-22 DIAGNOSIS — M65.312 TRIGGER FINGER OF LEFT THUMB: ICD-10-CM

## 2021-02-22 PROCEDURE — 97760 ORTHOTIC MGMT&TRAING 1ST ENC: CPT | Performed by: OCCUPATIONAL THERAPIST

## 2021-02-22 PROCEDURE — 97166 OT EVAL MOD COMPLEX 45 MIN: CPT | Performed by: OCCUPATIONAL THERAPIST

## 2021-02-22 NOTE — PROGRESS NOTES
OCCUPATIONAL THERAPY UPPER EXTREMITY EVALUATION:   Referring Physician: Dr. Gage Hummel  Date of onset: Dec.2020  Diagnosis: thumb CMC, left thumb trigger finger Date of Service: 02/22/21     PATIENT SUMMARY:   Severa Boop is a 60year old y/o female who p OBJECTIVE:   OBSERVATION: Patient seen for OT evaluation, trigger finger splint orthosis fabrication and thumb AROM.     ORTHOTICS: trigger finger orthosis    EDEMA:  RIGHT HAND:    Thumb   P1 5.9   PIP    P2    DIP    P3      LEFT HAND:    Thumb   P1 5.8 independent and compliant with comprehensive HEP to maintain progress achieved in OT. Patient will demonstrate increase in left thumb RODRGIUES to at least 130 degrees for ease in zipping coat.   Patient will demonstrate independence with don/doff trigger finger

## 2021-03-18 ENCOUNTER — OFFICE VISIT (OUTPATIENT)
Dept: OCCUPATIONAL MEDICINE | Facility: HOSPITAL | Age: 66
End: 2021-03-18
Attending: FAMILY MEDICINE
Payer: COMMERCIAL

## 2021-03-18 PROCEDURE — 97035 APP MDLTY 1+ULTRASOUND EA 15: CPT | Performed by: OCCUPATIONAL THERAPIST

## 2021-03-18 PROCEDURE — 97110 THERAPEUTIC EXERCISES: CPT | Performed by: OCCUPATIONAL THERAPIST

## 2021-03-18 PROCEDURE — 97140 MANUAL THERAPY 1/> REGIONS: CPT | Performed by: OCCUPATIONAL THERAPIST

## 2021-03-18 NOTE — PROGRESS NOTES
Dx:  thumb CMC, left thumb trigger finger        Authorized # of Visits:  8        Next MD visit: none scheduled  Fall Risk: standard         Precautions: n/a           Medication Changes since last visit?: No  Subjective: \"I've been wearing the thumb b coat.  Patient will demonstrate independence with don/doff trigger finger orthosis. .. .(Acheived)  Patient will demonstrate increase in left lateral and three point pinch to at least 10 lbs for ease in opening jars      Plan: Continue to work on reducing pa

## 2021-03-22 ENCOUNTER — OFFICE VISIT (OUTPATIENT)
Dept: OCCUPATIONAL MEDICINE | Facility: HOSPITAL | Age: 66
End: 2021-03-22
Attending: FAMILY MEDICINE
Payer: COMMERCIAL

## 2021-03-22 PROCEDURE — 97035 APP MDLTY 1+ULTRASOUND EA 15: CPT | Performed by: OCCUPATIONAL THERAPIST

## 2021-03-22 PROCEDURE — 97140 MANUAL THERAPY 1/> REGIONS: CPT | Performed by: OCCUPATIONAL THERAPIST

## 2021-03-22 PROCEDURE — 97110 THERAPEUTIC EXERCISES: CPT | Performed by: OCCUPATIONAL THERAPIST

## 2021-03-22 RX ORDER — FLUTICASONE PROPIONATE 220 UG/1
AEROSOL, METERED RESPIRATORY (INHALATION)
Qty: 36 INHALER | Refills: 3 | Status: SHIPPED | OUTPATIENT
Start: 2021-03-22

## 2021-03-22 NOTE — PROGRESS NOTES
Dx:  thumb CMC, left thumb trigger finger        Authorized # of Visits:  8        Next MD visit: none scheduled  Fall Risk: standard         Precautions: n/a           Medication Changes since last visit?: No  Subjective: \"My thumb is feeling better si 1/10.  Pt will be independent and compliant with comprehensive HEP to maintain progress achieved in OT. Patient will demonstrate increase in left thumb RODRIGUES to at least 130 degrees for ease in zipping coat.   Patient will demonstrate independence with don/d

## 2021-03-24 ENCOUNTER — OFFICE VISIT (OUTPATIENT)
Dept: OCCUPATIONAL MEDICINE | Facility: HOSPITAL | Age: 66
End: 2021-03-24
Attending: FAMILY MEDICINE
Payer: COMMERCIAL

## 2021-03-24 PROCEDURE — 97035 APP MDLTY 1+ULTRASOUND EA 15: CPT | Performed by: OCCUPATIONAL THERAPIST

## 2021-03-24 PROCEDURE — 97110 THERAPEUTIC EXERCISES: CPT | Performed by: OCCUPATIONAL THERAPIST

## 2021-03-24 PROCEDURE — 97018 PARAFFIN BATH THERAPY: CPT | Performed by: OCCUPATIONAL THERAPIST

## 2021-03-24 PROCEDURE — 97140 MANUAL THERAPY 1/> REGIONS: CPT | Performed by: OCCUPATIONAL THERAPIST

## 2021-03-24 NOTE — PROGRESS NOTES
Dx:  thumb CMC, left thumb trigger finger        Authorized # of Visits:  8        Next MD visit: none scheduled  Fall Risk: standard         Precautions: n/a           Medication Changes since last visit?: No  Subjective: Patient reports thumb continues pulsed ultrasound 3.0 MHZ, 0.9 w/cm2    8 min over thumb A1 pulley  8 min over thumb A1 pulley  8 min over thumb A1 pulley                 Assessment: Patient improved left thumb RODRIGUES by 47 degrees. , upgraded gripping and resistive exercise with no increase

## 2021-03-26 ENCOUNTER — APPOINTMENT (OUTPATIENT)
Dept: OCCUPATIONAL MEDICINE | Facility: HOSPITAL | Age: 66
End: 2021-03-26
Payer: COMMERCIAL

## 2021-03-29 ENCOUNTER — OFFICE VISIT (OUTPATIENT)
Dept: OCCUPATIONAL MEDICINE | Facility: HOSPITAL | Age: 66
End: 2021-03-29
Attending: FAMILY MEDICINE
Payer: COMMERCIAL

## 2021-03-29 PROCEDURE — 97110 THERAPEUTIC EXERCISES: CPT | Performed by: OCCUPATIONAL THERAPIST

## 2021-03-29 PROCEDURE — 97140 MANUAL THERAPY 1/> REGIONS: CPT | Performed by: OCCUPATIONAL THERAPIST

## 2021-03-29 PROCEDURE — 97018 PARAFFIN BATH THERAPY: CPT | Performed by: OCCUPATIONAL THERAPIST

## 2021-03-29 PROCEDURE — 97035 APP MDLTY 1+ULTRASOUND EA 15: CPT | Performed by: OCCUPATIONAL THERAPIST

## 2021-03-29 NOTE — PROGRESS NOTES
Dx:  thumb CMC, left thumb trigger finger        Authorized # of Visits:  8        Next MD visit: none scheduled  Fall Risk: standard         Precautions: n/a           Medication Changes since last visit?: No  Subjective: \"My thumb is feeling a lot bet Neuromuscular Re-education                                                             Modalities                    moist heat       paraffin 5 min  paraffin 5 min          50% pulsed ultrasound 3.0 MHZ, 0.9 w/cm2

## 2021-03-31 ENCOUNTER — TELEPHONE (OUTPATIENT)
Dept: PHYSICAL THERAPY | Age: 66
End: 2021-03-31

## 2021-03-31 ENCOUNTER — APPOINTMENT (OUTPATIENT)
Dept: OCCUPATIONAL MEDICINE | Facility: HOSPITAL | Age: 66
End: 2021-03-31
Attending: FAMILY MEDICINE
Payer: COMMERCIAL

## 2021-04-02 ENCOUNTER — APPOINTMENT (OUTPATIENT)
Dept: OCCUPATIONAL MEDICINE | Facility: HOSPITAL | Age: 66
End: 2021-04-02
Attending: FAMILY MEDICINE
Payer: COMMERCIAL

## 2021-04-05 ENCOUNTER — OFFICE VISIT (OUTPATIENT)
Dept: OCCUPATIONAL MEDICINE | Facility: HOSPITAL | Age: 66
End: 2021-04-05
Attending: FAMILY MEDICINE
Payer: COMMERCIAL

## 2021-04-05 PROCEDURE — 97110 THERAPEUTIC EXERCISES: CPT | Performed by: OCCUPATIONAL THERAPIST

## 2021-04-05 PROCEDURE — 97140 MANUAL THERAPY 1/> REGIONS: CPT | Performed by: OCCUPATIONAL THERAPIST

## 2021-04-05 NOTE — PROGRESS NOTES
Dx:  thumb CMC, left thumb trigger finger        Authorized # of Visits:  8        Next MD visit: none scheduled  Fall Risk: standard         Precautions: n/a           Medication Changes since last visit?: No  Subjective:  \"The good news is my left thum thumb flexor stretches    10 sec x 5  10 sec x 5  yellow web hand flexion  x 10  red web  x 20  red web  x 20        yellow web      x 20  x20             HEP instruction                       see below, x10, 3x/day                 Ther

## 2021-04-13 ENCOUNTER — OFFICE VISIT (OUTPATIENT)
Dept: OCCUPATIONAL MEDICINE | Facility: HOSPITAL | Age: 66
End: 2021-04-13
Attending: FAMILY MEDICINE
Payer: COMMERCIAL

## 2021-04-13 PROCEDURE — 97140 MANUAL THERAPY 1/> REGIONS: CPT | Performed by: OCCUPATIONAL THERAPIST

## 2021-04-13 PROCEDURE — 97110 THERAPEUTIC EXERCISES: CPT | Performed by: OCCUPATIONAL THERAPIST

## 2021-04-15 ENCOUNTER — APPOINTMENT (OUTPATIENT)
Dept: OCCUPATIONAL MEDICINE | Facility: HOSPITAL | Age: 66
End: 2021-04-15
Attending: FAMILY MEDICINE
Payer: COMMERCIAL

## 2021-04-16 RX ORDER — GABAPENTIN 300 MG/1
CAPSULE ORAL
Qty: 30 CAPSULE | Refills: 1 | Status: SHIPPED | OUTPATIENT
Start: 2021-04-16 | End: 2021-07-09

## 2021-05-14 RX ORDER — LOSARTAN POTASSIUM AND HYDROCHLOROTHIAZIDE 12.5; 1 MG/1; MG/1
TABLET ORAL
Qty: 90 TABLET | Refills: 3 | Status: SHIPPED | OUTPATIENT
Start: 2021-05-14

## 2021-06-03 RX ORDER — ZOLPIDEM TARTRATE 12.5 MG/1
TABLET, FILM COATED, EXTENDED RELEASE ORAL
Qty: 15 TABLET | Refills: 0 | Status: SHIPPED | OUTPATIENT
Start: 2021-06-03

## 2021-07-02 ENCOUNTER — TELEPHONE (OUTPATIENT)
Dept: FAMILY MEDICINE CLINIC | Facility: CLINIC | Age: 66
End: 2021-07-02

## 2021-07-02 RX ORDER — SERTRALINE HYDROCHLORIDE 100 MG/1
50 TABLET, FILM COATED ORAL DAILY
Qty: 90 TABLET | Refills: 1 | Status: SHIPPED | OUTPATIENT
Start: 2021-07-02 | End: 2021-08-08

## 2021-07-02 RX ORDER — ZALEPLON 5 MG/1
5 CAPSULE ORAL NIGHTLY
Qty: 30 CAPSULE | Refills: 1 | Status: SHIPPED | OUTPATIENT
Start: 2021-07-02

## 2021-07-02 NOTE — TELEPHONE ENCOUNTER
Patient contacted. She reports her granddaughter  sudden infant death on  at 7 months of age. It was the same day her  had back surgery. Trying to cope with supporting her son's family, her  and his recovery.   He fell the night aft

## 2021-07-02 NOTE — TELEPHONE ENCOUNTER
Patient states she's going through some hard times regarding family (will not state) and wondering if Dr. Ninoska Corrigan can increase her Zoloft. Patient denies suicidal ideations.

## 2021-07-09 RX ORDER — GABAPENTIN 300 MG/1
CAPSULE ORAL
Qty: 30 CAPSULE | Refills: 1 | Status: SHIPPED | OUTPATIENT
Start: 2021-07-09 | End: 2021-09-24

## 2021-07-15 RX ORDER — LETROZOLE 2.5 MG/1
TABLET, FILM COATED ORAL
Qty: 90 TABLET | Refills: 2 | Status: SHIPPED | OUTPATIENT
Start: 2021-07-15 | End: 2022-02-28

## 2021-08-06 RX ORDER — MONTELUKAST SODIUM 10 MG/1
10 TABLET ORAL NIGHTLY
Qty: 90 TABLET | Refills: 3 | Status: SHIPPED | OUTPATIENT
Start: 2021-08-06

## 2021-08-08 ENCOUNTER — PATIENT MESSAGE (OUTPATIENT)
Dept: FAMILY MEDICINE CLINIC | Facility: CLINIC | Age: 66
End: 2021-08-08

## 2021-08-16 ENCOUNTER — PATIENT MESSAGE (OUTPATIENT)
Dept: FAMILY MEDICINE CLINIC | Facility: CLINIC | Age: 66
End: 2021-08-16

## 2021-08-18 NOTE — TELEPHONE ENCOUNTER
Dr Lyon=see patient's message, pended right dose of sertraline. See also 8/8/21 refill MyChart encounter. From: Deja Flores  To: Talita Robles MD  Sent: 8/16/2021 10:31 PM CDT  Subject: Prescription Question    Hi.  I messaged last week to claudy

## 2021-09-24 RX ORDER — GABAPENTIN 300 MG/1
CAPSULE ORAL
Qty: 30 CAPSULE | Refills: 1 | Status: SHIPPED | OUTPATIENT
Start: 2021-09-24 | End: 2021-12-10

## 2021-10-11 RX ORDER — BUPROPION HYDROCHLORIDE 150 MG/1
TABLET ORAL
Qty: 90 TABLET | Refills: 3 | Status: SHIPPED | OUTPATIENT
Start: 2021-10-11

## 2021-11-01 ENCOUNTER — OFFICE VISIT (OUTPATIENT)
Dept: HEMATOLOGY/ONCOLOGY | Facility: HOSPITAL | Age: 66
End: 2021-11-01
Attending: NURSE PRACTITIONER
Payer: MEDICARE

## 2021-11-01 VITALS
SYSTOLIC BLOOD PRESSURE: 137 MMHG | OXYGEN SATURATION: 98 % | DIASTOLIC BLOOD PRESSURE: 71 MMHG | WEIGHT: 135 LBS | BODY MASS INDEX: 24.84 KG/M2 | RESPIRATION RATE: 16 BRPM | HEART RATE: 70 BPM | TEMPERATURE: 98 F | HEIGHT: 62 IN

## 2021-11-01 DIAGNOSIS — Z17.0 MALIGNANT NEOPLASM OF UPPER-INNER QUADRANT OF RIGHT BREAST IN FEMALE, ESTROGEN RECEPTOR POSITIVE (HCC): Primary | ICD-10-CM

## 2021-11-01 DIAGNOSIS — C50.211 MALIGNANT NEOPLASM OF UPPER-INNER QUADRANT OF RIGHT BREAST IN FEMALE, ESTROGEN RECEPTOR POSITIVE (HCC): Primary | ICD-10-CM

## 2021-11-01 DIAGNOSIS — Z79.811 ENCOUNTER FOR MONITORING AROMATASE INHIBITOR THERAPY: ICD-10-CM

## 2021-11-01 DIAGNOSIS — Z51.81 ENCOUNTER FOR MONITORING AROMATASE INHIBITOR THERAPY: ICD-10-CM

## 2021-11-01 PROCEDURE — 99214 OFFICE O/P EST MOD 30 MIN: CPT | Performed by: NURSE PRACTITIONER

## 2021-11-01 NOTE — PROGRESS NOTES
GRISELDA       Cedrick Dempsey is a 73year old female who is here today for follow-up of diagnosis of Malignant neoplasm of upper-inner quadrant of right breast in female, estrogen receptor positive (hcc)  (primary encounter diagnosis)  Encounter for monit FLOVENT  MCG/ACT Inhalation Aerosol INHALE 2 PUFFS BY MOUTH INTO THE LUNGS DAILY 36 Inhaler 3   • Meloxicam 15 MG Oral Tab Take 1 tablet (15 mg total) by mouth daily. For 2 wks then as needed.  30 tablet 1   • AZELASTINE HCL 0.1 % Nasal Solution INST neoplasm of upper-inner quadrant of right breast in female, estrogen receptor positive ([facility] [address] 4/22/2019   • Migraines    • Sinusitis      Past Surgical History:   Procedure Laterality Date   • BONE DENSITY TEST SCAN  2012    5/10/2012 normal bone mineral de Oropharynx is clear. Neck: No JVD. No palpable lymphadenopathy. Neck is supple. Chest: Clear to auscultation. Breasts: R breast with surgical scar, axillary scar palpable, no masses. L breast no masses. Heart: Regular rate and rhythm.    Abdomen: Sof hour(s)). Imaging & Referrals:  None   No orders of the defined types were placed in this encounter.

## 2021-12-10 RX ORDER — GABAPENTIN 300 MG/1
300 CAPSULE ORAL NIGHTLY
Qty: 90 CAPSULE | Refills: 1 | Status: SHIPPED | OUTPATIENT
Start: 2021-12-10 | End: 2022-02-28

## 2021-12-23 ENCOUNTER — HOSPITAL ENCOUNTER (OUTPATIENT)
Dept: BONE DENSITY | Facility: HOSPITAL | Age: 66
Discharge: HOME | End: 2021-12-23
Attending: INTERNAL MEDICINE
Payer: MEDICARE

## 2021-12-23 DIAGNOSIS — M85.89 OSTEOPENIA OF MULTIPLE SITES: ICD-10-CM

## 2021-12-23 PROCEDURE — 77080 DXA BONE DENSITY AXIAL: CPT | Performed by: INTERNAL MEDICINE

## 2021-12-27 ENCOUNTER — HOSPITAL ENCOUNTER (OUTPATIENT)
Dept: MAMMOGRAPHY | Age: 66
Discharge: HOME | End: 2021-12-27
Attending: INTERNAL MEDICINE
Payer: MEDICARE

## 2021-12-27 DIAGNOSIS — Z12.31 SCREENING MAMMOGRAM, ENCOUNTER FOR: ICD-10-CM

## 2021-12-27 PROCEDURE — 77067 SCR MAMMO BI INCL CAD: CPT | Performed by: INTERNAL MEDICINE

## 2021-12-27 PROCEDURE — 77063 BREAST TOMOSYNTHESIS BI: CPT | Performed by: INTERNAL MEDICINE

## 2021-12-28 DIAGNOSIS — Z78.0 OSTEOPENIA AFTER MENOPAUSE: Primary | ICD-10-CM

## 2021-12-28 DIAGNOSIS — M85.80 OSTEOPENIA AFTER MENOPAUSE: Primary | ICD-10-CM

## 2022-01-13 ENCOUNTER — NURSE ONLY (OUTPATIENT)
Dept: FAMILY MEDICINE CLINIC | Facility: CLINIC | Age: 67
End: 2022-01-13
Payer: COMMERCIAL

## 2022-01-13 DIAGNOSIS — Z23 NEED FOR VACCINATION: Primary | ICD-10-CM

## 2022-01-13 PROCEDURE — 90662 IIV NO PRSV INCREASED AG IM: CPT | Performed by: FAMILY MEDICINE

## 2022-01-13 PROCEDURE — 90471 IMMUNIZATION ADMIN: CPT | Performed by: FAMILY MEDICINE

## 2022-02-07 ENCOUNTER — LAB ENCOUNTER (OUTPATIENT)
Dept: LAB | Age: 67
End: 2022-02-07
Attending: INTERNAL MEDICINE
Payer: MEDICARE

## 2022-02-07 DIAGNOSIS — M85.80 OSTEOPENIA AFTER MENOPAUSE: ICD-10-CM

## 2022-02-07 DIAGNOSIS — Z78.0 OSTEOPENIA AFTER MENOPAUSE: ICD-10-CM

## 2022-02-07 LAB — VIT D+METAB SERPL-MCNC: 29.4 NG/ML (ref 30–100)

## 2022-02-07 PROCEDURE — 82306 VITAMIN D 25 HYDROXY: CPT

## 2022-02-07 PROCEDURE — 36415 COLL VENOUS BLD VENIPUNCTURE: CPT

## 2022-02-28 RX ORDER — LETROZOLE 2.5 MG/1
2.5 TABLET, FILM COATED ORAL DAILY
Qty: 90 TABLET | Refills: 2 | Status: SHIPPED | OUTPATIENT
Start: 2022-02-28 | End: 2022-11-02

## 2022-02-28 RX ORDER — GABAPENTIN 300 MG/1
300 CAPSULE ORAL NIGHTLY
Qty: 90 CAPSULE | Refills: 1 | Status: SHIPPED | OUTPATIENT
Start: 2022-02-28 | End: 2022-12-22

## 2022-03-14 ENCOUNTER — PATIENT MESSAGE (OUTPATIENT)
Dept: FAMILY MEDICINE CLINIC | Facility: CLINIC | Age: 67
End: 2022-03-14

## 2022-03-14 RX ORDER — MONTELUKAST SODIUM 10 MG/1
10 TABLET ORAL NIGHTLY
Qty: 90 TABLET | Refills: 3 | Status: CANCELLED | OUTPATIENT
Start: 2022-03-14

## 2022-03-15 RX ORDER — MONTELUKAST SODIUM 10 MG/1
10 TABLET ORAL NIGHTLY
Qty: 90 TABLET | Refills: 1 | Status: SHIPPED | OUTPATIENT
Start: 2022-03-15

## 2022-03-15 RX ORDER — MONTELUKAST SODIUM 10 MG/1
10 TABLET ORAL NIGHTLY
Qty: 90 TABLET | Refills: 1 | Status: CANCELLED | OUTPATIENT
Start: 2022-03-15

## 2022-03-15 NOTE — TELEPHONE ENCOUNTER
From: Melinda Butler  To: Talita Bain MD  Sent: 3/14/2022 5:53 PM CDT  Subject: Rx    I am using [facility] for my prescriptions. I have an upcoming refill for Montelukast and they called to say that they submitted a request for a prescription and have not gotten a response. Could you look into? Thank you.

## 2022-04-13 NOTE — TELEPHONE ENCOUNTER
Please contact patient to schedule appt for routine PE, and send back to me if needed prior to visit.

## 2022-04-15 NOTE — TELEPHONE ENCOUNTER
Sertraline pended for approval.    Future Appointments   Date Time Provider Ta Liset   5/2/2022 11:30 AM Tomas Reyes  [facility] [place] HEM ONC E[facility]   5/17/2022  6:15 PM Esequiel Corral MD [address] [place] [address]

## 2022-04-15 NOTE — TELEPHONE ENCOUNTER
Spoke to patient, she made an appt 5-17, is on waitlist for sooner appt, but yes, does need this medication, Sertraline, refilled asap. She is out of medication.

## 2022-05-02 ENCOUNTER — OFFICE VISIT (OUTPATIENT)
Dept: HEMATOLOGY/ONCOLOGY | Facility: HOSPITAL | Age: 67
End: 2022-05-02
Attending: INTERNAL MEDICINE
Payer: MEDICARE

## 2022-05-02 VITALS
OXYGEN SATURATION: 99 % | HEART RATE: 70 BPM | TEMPERATURE: 98 F | HEIGHT: 62 IN | SYSTOLIC BLOOD PRESSURE: 134 MMHG | RESPIRATION RATE: 16 BRPM | DIASTOLIC BLOOD PRESSURE: 69 MMHG | BODY MASS INDEX: 24.48 KG/M2 | WEIGHT: 133 LBS

## 2022-05-02 DIAGNOSIS — M85.89 OSTEOPENIA OF MULTIPLE SITES: ICD-10-CM

## 2022-05-02 DIAGNOSIS — Z12.31 SCREENING MAMMOGRAM, ENCOUNTER FOR: ICD-10-CM

## 2022-05-02 DIAGNOSIS — Z79.811 ENCOUNTER FOR MONITORING AROMATASE INHIBITOR THERAPY: ICD-10-CM

## 2022-05-02 DIAGNOSIS — C50.211 MALIGNANT NEOPLASM OF UPPER-INNER QUADRANT OF RIGHT BREAST IN FEMALE, ESTROGEN RECEPTOR POSITIVE (HCC): Primary | ICD-10-CM

## 2022-05-02 DIAGNOSIS — Z17.0 MALIGNANT NEOPLASM OF UPPER-INNER QUADRANT OF RIGHT BREAST IN FEMALE, ESTROGEN RECEPTOR POSITIVE (HCC): Primary | ICD-10-CM

## 2022-05-02 DIAGNOSIS — N60.99 ATYPICAL LOBULAR HYPERPLASIA OF BREAST: ICD-10-CM

## 2022-05-02 DIAGNOSIS — Z51.81 ENCOUNTER FOR MONITORING AROMATASE INHIBITOR THERAPY: ICD-10-CM

## 2022-05-02 PROCEDURE — 99214 OFFICE O/P EST MOD 30 MIN: CPT | Performed by: INTERNAL MEDICINE

## 2022-05-17 ENCOUNTER — OFFICE VISIT (OUTPATIENT)
Dept: FAMILY MEDICINE CLINIC | Facility: CLINIC | Age: 67
End: 2022-05-17
Payer: COMMERCIAL

## 2022-05-17 VITALS
DIASTOLIC BLOOD PRESSURE: 79 MMHG | HEART RATE: 72 BPM | HEIGHT: 62 IN | BODY MASS INDEX: 24.33 KG/M2 | SYSTOLIC BLOOD PRESSURE: 133 MMHG | RESPIRATION RATE: 18 BRPM | WEIGHT: 132.19 LBS | TEMPERATURE: 98 F

## 2022-05-17 DIAGNOSIS — Z17.0 MALIGNANT NEOPLASM OF UPPER-INNER QUADRANT OF RIGHT BREAST IN FEMALE, ESTROGEN RECEPTOR POSITIVE (HCC): ICD-10-CM

## 2022-05-17 DIAGNOSIS — J06.9 UPPER RESPIRATORY TRACT INFECTION, UNSPECIFIED TYPE: ICD-10-CM

## 2022-05-17 DIAGNOSIS — Z00.00 ENCOUNTER FOR ANNUAL HEALTH EXAMINATION: ICD-10-CM

## 2022-05-17 DIAGNOSIS — M85.89 OSTEOPENIA OF MULTIPLE SITES: ICD-10-CM

## 2022-05-17 DIAGNOSIS — L30.9 ECZEMA, UNSPECIFIED TYPE: ICD-10-CM

## 2022-05-17 DIAGNOSIS — J45.40 MODERATE PERSISTENT ASTHMA WITHOUT COMPLICATION: Chronic | ICD-10-CM

## 2022-05-17 DIAGNOSIS — I10 ESSENTIAL HYPERTENSION: ICD-10-CM

## 2022-05-17 DIAGNOSIS — J30.89 NON-SEASONAL ALLERGIC RHINITIS, UNSPECIFIED TRIGGER: Chronic | ICD-10-CM

## 2022-05-17 DIAGNOSIS — J45.41 MODERATE PERSISTENT ASTHMA WITH ACUTE EXACERBATION: ICD-10-CM

## 2022-05-17 DIAGNOSIS — C50.211 MALIGNANT NEOPLASM OF UPPER-INNER QUADRANT OF RIGHT BREAST IN FEMALE, ESTROGEN RECEPTOR POSITIVE (HCC): ICD-10-CM

## 2022-05-17 DIAGNOSIS — Z00.00 ROUTINE PHYSICAL EXAMINATION: Primary | ICD-10-CM

## 2022-05-17 DIAGNOSIS — Z86.010 HISTORY OF COLON POLYPS: ICD-10-CM

## 2022-05-17 DIAGNOSIS — H91.90 HEARING LOSS, UNSPECIFIED HEARING LOSS TYPE, UNSPECIFIED LATERALITY: ICD-10-CM

## 2022-05-17 DIAGNOSIS — F33.41 RECURRENT MAJOR DEPRESSIVE DISORDER, IN PARTIAL REMISSION (HCC): Chronic | ICD-10-CM

## 2022-05-17 PROBLEM — F32.4 MAJOR DEPRESSION IN PARTIAL REMISSION: Chronic | Status: ACTIVE | Noted: 2022-05-17

## 2022-05-17 PROBLEM — Z51.81 ENCOUNTER FOR MONITORING AROMATASE INHIBITOR THERAPY: Status: RESOLVED | Noted: 2020-01-21 | Resolved: 2022-05-17

## 2022-05-17 PROBLEM — Z79.811 ENCOUNTER FOR MONITORING AROMATASE INHIBITOR THERAPY: Status: RESOLVED | Noted: 2020-01-21 | Resolved: 2022-05-17

## 2022-05-17 PROBLEM — Z13.79 ASHKENAZI JEWISH ANCESTRY REQUIRING POPULATION-SPECIFIC SCREENING FOR GENETIC DISORDER: Status: RESOLVED | Noted: 2019-04-22 | Resolved: 2022-05-17

## 2022-05-17 PROBLEM — F32.4 MAJOR DEPRESSION IN PARTIAL REMISSION (HCC): Chronic | Status: ACTIVE | Noted: 2022-05-17

## 2022-05-17 PROBLEM — R59.0 LYMPHADENOPATHY, AXILLARY: Status: RESOLVED | Noted: 2020-10-07 | Resolved: 2022-05-17

## 2022-05-17 PROCEDURE — 3008F BODY MASS INDEX DOCD: CPT | Performed by: FAMILY MEDICINE

## 2022-05-17 PROCEDURE — 3075F SYST BP GE 130 - 139MM HG: CPT | Performed by: FAMILY MEDICINE

## 2022-05-17 PROCEDURE — 90732 PPSV23 VACC 2 YRS+ SUBQ/IM: CPT | Performed by: FAMILY MEDICINE

## 2022-05-17 PROCEDURE — 96160 PT-FOCUSED HLTH RISK ASSMT: CPT | Performed by: FAMILY MEDICINE

## 2022-05-17 PROCEDURE — G0403 EKG FOR INITIAL PREVENT EXAM: HCPCS | Performed by: FAMILY MEDICINE

## 2022-05-17 PROCEDURE — G0009 ADMIN PNEUMOCOCCAL VACCINE: HCPCS | Performed by: FAMILY MEDICINE

## 2022-05-17 PROCEDURE — G0402 INITIAL PREVENTIVE EXAM: HCPCS | Performed by: FAMILY MEDICINE

## 2022-05-17 PROCEDURE — 99397 PER PM REEVAL EST PAT 65+ YR: CPT | Performed by: FAMILY MEDICINE

## 2022-05-17 PROCEDURE — 3078F DIAST BP <80 MM HG: CPT | Performed by: FAMILY MEDICINE

## 2022-05-17 RX ORDER — ALBUTEROL SULFATE 90 UG/1
2 AEROSOL, METERED RESPIRATORY (INHALATION) 4 TIMES DAILY PRN
Qty: 1 EACH | Refills: 3 | Status: SHIPPED | OUTPATIENT
Start: 2022-05-17

## 2022-05-17 RX ORDER — LORAZEPAM 0.5 MG/1
0.5 TABLET ORAL EVERY 6 HOURS PRN
Qty: 20 TABLET | Refills: 0 | Status: SHIPPED | OUTPATIENT
Start: 2022-05-17

## 2022-05-17 RX ORDER — ZALEPLON 5 MG/1
5 CAPSULE ORAL NIGHTLY
Qty: 30 CAPSULE | Refills: 1 | Status: SHIPPED | OUTPATIENT
Start: 2022-05-17

## 2022-05-17 RX ORDER — SERTRALINE HYDROCHLORIDE 100 MG/1
100 TABLET, FILM COATED ORAL DAILY
Qty: 90 TABLET | Refills: 1 | Status: SHIPPED | OUTPATIENT
Start: 2022-05-17

## 2022-05-27 ENCOUNTER — LAB ENCOUNTER (OUTPATIENT)
Dept: LAB | Age: 67
End: 2022-05-27
Attending: FAMILY MEDICINE
Payer: MEDICARE

## 2022-05-27 DIAGNOSIS — I10 ESSENTIAL HYPERTENSION: ICD-10-CM

## 2022-05-27 DIAGNOSIS — Z00.00 ROUTINE PHYSICAL EXAMINATION: Primary | ICD-10-CM

## 2022-05-28 LAB
ABSOLUTE BASOPHILS: 86 CELLS/UL (ref 0–200)
ABSOLUTE EOSINOPHILS: 972 CELLS/UL (ref 15–500)
ABSOLUTE LYMPHOCYTES: 2288 CELLS/UL (ref 850–3900)
ABSOLUTE MONOCYTES: 585 CELLS/UL (ref 200–950)
ABSOLUTE NEUTROPHILS: 4670 CELLS/UL (ref 1500–7800)
BASOPHILS: 1 %
BUN: 20 MG/DL (ref 7–25)
CALCIUM: 9.5 MG/DL (ref 8.6–10.4)
CARBON DIOXIDE: 31 MMOL/L (ref 20–32)
CHLORIDE: 101 MMOL/L (ref 98–110)
CHOL/HDLC RATIO: 4.1 (CALC)
CHOLESTEROL, TOTAL: 212 MG/DL
CREATININE: 0.9 MG/DL (ref 0.5–0.99)
EGFR IF AFRICN AM: 78 ML/MIN/1.73M2
EGFR IF NONAFRICN AM: 67 ML/MIN/1.73M2
EOSINOPHILS: 11.3 %
GLUCOSE: 90 MG/DL (ref 65–99)
HDL CHOLESTEROL: 52 MG/DL
HEMATOCRIT: 37.2 % (ref 35–45)
HEMOGLOBIN: 12.8 G/DL (ref 11.7–15.5)
LDL-CHOLESTEROL: 114 MG/DL (CALC)
LYMPHOCYTES: 26.6 %
MCH: 29.6 PG (ref 27–33)
MCHC: 34.4 G/DL (ref 32–36)
MCV: 86.1 FL (ref 80–100)
MONOCYTES: 6.8 %
MPV: 10.9 FL (ref 7.5–12.5)
NEUTROPHILS: 54.3 %
NON-HDL CHOLESTEROL: 160 MG/DL (CALC)
PLATELET COUNT: 266 THOUSAND/UL (ref 140–400)
POTASSIUM: 3.9 MMOL/L (ref 3.5–5.3)
RDW: 13 % (ref 11–15)
RED BLOOD CELL COUNT: 4.32 MILLION/UL (ref 3.8–5.1)
SODIUM: 140 MMOL/L (ref 135–146)
TRIGLYCERIDES: 323 MG/DL
WHITE BLOOD CELL COUNT: 8.6 THOUSAND/UL (ref 3.8–10.8)

## 2022-05-30 ENCOUNTER — PATIENT MESSAGE (OUTPATIENT)
Dept: FAMILY MEDICINE CLINIC | Facility: CLINIC | Age: 67
End: 2022-05-30

## 2022-05-30 RX ORDER — ALBUTEROL SULFATE 90 UG/1
2 AEROSOL, METERED RESPIRATORY (INHALATION) EVERY 4 HOURS PRN
Qty: 3 EACH | Refills: 3 | Status: SHIPPED | OUTPATIENT
Start: 2022-05-30

## 2022-05-30 NOTE — TELEPHONE ENCOUNTER
From: Amelia Georgia  To: Talita Rosen MD  Sent: 5/30/2022 2:52 PM CDT  Subject: albuterol    On May 17 you sent an Rx for Albuterol to [facility]. They are telling me that my insurance requires either that I use ProAir or that you send a justification to them as to why Albuterol/Ventolin is necessary. Can you get in touch with [facility] to either change the Rx to ProAir or justify the Ventolin?   Thanks

## 2022-08-03 ENCOUNTER — NURSE TRIAGE (OUTPATIENT)
Dept: FAMILY MEDICINE CLINIC | Facility: CLINIC | Age: 67
End: 2022-08-03

## 2022-08-03 ENCOUNTER — HOSPITAL ENCOUNTER (OUTPATIENT)
Age: 67
Discharge: HOME | End: 2022-08-03
Payer: MEDICARE

## 2022-08-03 VITALS
RESPIRATION RATE: 20 BRPM | OXYGEN SATURATION: 97 % | HEART RATE: 84 BPM | DIASTOLIC BLOOD PRESSURE: 70 MMHG | TEMPERATURE: 98 F | SYSTOLIC BLOOD PRESSURE: 117 MMHG

## 2022-08-03 DIAGNOSIS — K62.5 RECTAL BLEEDING: ICD-10-CM

## 2022-08-03 DIAGNOSIS — R10.30 LOWER ABDOMINAL PAIN: Primary | ICD-10-CM

## 2022-08-03 LAB
#MXD IC: 0.4 X10ˆ3/UL (ref 0.1–1)
BILIRUB UR QL STRIP: NEGATIVE
BUN BLD-MCNC: 17 MG/DL (ref 7–18)
CHLORIDE BLD-SCNC: 101 MMOL/L (ref 98–112)
CLARITY UR: CLEAR
CO2 BLD-SCNC: 26 MMOL/L (ref 21–32)
COLOR UR: YELLOW
CREAT BLD-MCNC: 0.7 MG/DL
GFR SERPLBLD BASED ON 1.73 SQ M-ARVRAT: 96 ML/MIN/1.73M2 (ref 60–?)
GLUCOSE BLD-MCNC: 125 MG/DL (ref 70–99)
GLUCOSE UR STRIP-MCNC: NEGATIVE MG/DL
HCT VFR BLD AUTO: 40.6 %
HCT VFR BLD CALC: 43 %
HGB BLD-MCNC: 13.8 G/DL
HGB UR QL STRIP: NEGATIVE
ISTAT IONIZED CALCIUM FOR CHEM 8: 1.14 MMOL/L (ref 1.12–1.32)
KETONES UR STRIP-MCNC: NEGATIVE MG/DL
LEUKOCYTE ESTERASE UR QL STRIP: NEGATIVE
LYMPHOCYTES # BLD AUTO: 1.6 X10ˆ3/UL (ref 1–4)
LYMPHOCYTES NFR BLD AUTO: 13 %
MCH RBC QN AUTO: 30.6 PG (ref 26–34)
MCHC RBC AUTO-ENTMCNC: 34 G/DL (ref 31–37)
MCV RBC AUTO: 90 FL (ref 80–100)
MIXED CELL %: 3.6 %
NEUTROPHILS # BLD AUTO: 10.4 X10ˆ3/UL (ref 1.5–7.7)
NEUTROPHILS NFR BLD AUTO: 83.4 %
NITRITE UR QL STRIP: NEGATIVE
PH UR STRIP: 7 [PH]
PLATELET # BLD AUTO: 243 X10ˆ3/UL (ref 150–450)
POTASSIUM BLD-SCNC: 3.5 MMOL/L (ref 3.6–5.1)
PROT UR STRIP-MCNC: NEGATIVE MG/DL
RBC # BLD AUTO: 4.51 X10ˆ6/UL
SODIUM BLD-SCNC: 137 MMOL/L (ref 136–145)
SP GR UR STRIP: 1.01
UROBILINOGEN UR STRIP-ACNC: <2 MG/DL
WBC # BLD AUTO: 12.4 X10ˆ3/UL (ref 4–11)

## 2022-08-03 PROCEDURE — 81002 URINALYSIS NONAUTO W/O SCOPE: CPT | Performed by: NURSE PRACTITIONER

## 2022-08-03 PROCEDURE — 99213 OFFICE O/P EST LOW 20 MIN: CPT | Performed by: NURSE PRACTITIONER

## 2022-08-03 PROCEDURE — 36415 COLL VENOUS BLD VENIPUNCTURE: CPT | Performed by: NURSE PRACTITIONER

## 2022-08-03 PROCEDURE — 85025 COMPLETE CBC W/AUTO DIFF WBC: CPT | Performed by: NURSE PRACTITIONER

## 2022-08-03 PROCEDURE — 80047 BASIC METABLC PNL IONIZED CA: CPT | Performed by: NURSE PRACTITIONER

## 2022-08-03 NOTE — ED INITIAL ASSESSMENT (HPI)
Pt c/o lower abdominal cramping x 3 days, states she noticed some blood on her last BM about one hour ago.  Denies fever/chills nausea or vomiting

## 2022-10-13 RX ORDER — LOSARTAN POTASSIUM AND HYDROCHLOROTHIAZIDE 12.5; 1 MG/1; MG/1
1 TABLET ORAL DAILY
Qty: 90 TABLET | Refills: 1 | Status: SHIPPED | OUTPATIENT
Start: 2022-10-13 | End: 2023-01-03

## 2022-10-13 NOTE — TELEPHONE ENCOUNTER
Refill passed per [address] [place] protocol. Requested Prescriptions   Pending Prescriptions Disp Refills    LOSARTAN POTASSIUM-HCTZ 100-12.5 MG Oral Tab [Pharmacy Med Name: Sasha Araiza 100-12.5 MG TAB] 90 tablet 3     Sig: TAKE 1 TABLET BY MOUTH EVERY DAY        Hypertensive Medications Protocol Passed - 10/13/2022 12:29 AM        Passed - In person appointment in the past 12 or next 3 months       Recent Outpatient Visits              4 months ago Routine physical examination    [address] [place], [address], Danyel Aaron MD    Office Visit    5 months ago Malignant neoplasm of upper-inner quadrant of right breast in female, estrogen receptor positive [facility])    [facility] Hematology Oncology Shine Tomlin MD    Office Visit    9 months ago Need for vaccination    [address], [address]    Nurse Only    11 months ago Malignant neoplasm of upper-inner quadrant of right breast in female, estrogen receptor positive [facility])    [facility] Hematology Oncology [address]ACE[address]    Office Visit    1 year ago     [id number]    Office Visit     Future Appointments         Provider Department Appt Notes    In 6 days Asuncion Carrillo [facility] Audiology Hearing loss    In 2 weeks Shine Tomlin [address] [place] [address] Hematology Oncology FOLLOW UP VISIT. CL  6M               Passed - Last BP reading less than 140/90     BP Readings from Last 1 Encounters:  08/03/22 : 117/70                Passed - CMP or BMP in past 6 months     Recent Results (from the past 4392 hour(s))   BASIC METABOLIC PANEL (8)    Collection Time: 05/27/22  3:17 PM   Result Value Ref Range    GLUCOSE 90 65 - 99 mg/dL     Comment:               Fasting reference interval         UREA NITROGEN (BUN) 20 7 - 25 mg/dL    CREATININE 0.90 0.50 - 0.99 mg/dL     Comment: For patients >52years of age, the reference limit  for Creatinine is approximately 13% higher for people  identified as -American. eGFR NON-AFR. AMERICAN 67 > OR = 60 mL/min/1.73m2    eGFR AFRICAN AMERICAN 78 > OR = 60 mL/min/1.73m2    BUN/CREATININE RATIO NOT APPLICABLE 6 - 22 (calc)    SODIUM 140 135 - 146 mmol/L    POTASSIUM 3.9 3.5 - 5.3 mmol/L    CHLORIDE 101 98 - 110 mmol/L    CARBON DIOXIDE 31 20 - 32 mmol/L    CALCIUM 9.5 8.6 - 10.4 mg/dL     *Note: Due to a large number of results and/or encounters for the requested time period, some results have not been displayed. A complete set of results can be found in Results Review. Passed - In person appointment or virtual visit in the past 6 months       Recent Outpatient Visits              4 months ago Routine physical examination    [address] [place], Lorraine Postal, MD    Office Visit    5 months ago Malignant neoplasm of upper-inner quadrant of right breast in female, estrogen receptor positive [facility])    [facility] Hematology Oncology Phillip Muñiz MD    Office Visit    9 months ago Need for vaccination    [address]    Nurse Only    11 months ago Malignant neoplasm of upper-inner quadrant of right breast in female, estrogen receptor positive [facility] Hematology Oncology TRACEY Nair    Office Visit    1 year ago     [address] [place] [address]    Office Visit     Future Appointments         Provider Department Appt Notes    In 6 days Asuncion Mota [facility] Audiology Hearing loss    In 2 weeks Manjeet Garland 19 Hematology Oncology FOLLOW UP VISIT. CL  6M               Passed - EGFRCR or GFRNAA > 50     GFR Evaluation  EGFRCR: 96 , resulted on 8/3/2022                Recent Outpatient Visits              4 months ago Routine physical examination    [address] Tierra Coburn MD    Office Visit    5 months ago Malignant neoplasm of upper-inner quadrant of right breast in female, estrogen receptor positive [facility])    [facility] Hematology Oncology Omid Ludwig MD    Office Visit    9 months ago Need for vaccination    [facility], [facility], [address], [facility]    Nurse Only    11 months ago Malignant neoplasm of upper-inner quadrant of right breast in female, estrogen receptor positive [facility])    [facility] Hematology Oncology TRACEY Rincon    Office Visit    1 year ago     [address] [place] [address], OT    Office Visit          Future Appointments         Provider Department Appt Notes    In 6 days Asuncion Henriquez [facility] Audiology Hearing loss    In 2 weeks Manjeet Haynes 19 Hematology Oncology FOLLOW UP VISIT. CL  6M

## 2022-10-19 ENCOUNTER — OFFICE VISIT (OUTPATIENT)
Dept: AUDIOLOGY | Facility: CLINIC | Age: 67
End: 2022-10-19
Payer: COMMERCIAL

## 2022-10-19 DIAGNOSIS — H90.3 SENSORINEURAL HEARING LOSS, BILATERAL: Primary | ICD-10-CM

## 2022-10-19 PROCEDURE — 92557 COMPREHENSIVE HEARING TEST: CPT | Performed by: AUDIOLOGIST

## 2022-10-19 PROCEDURE — 92567 TYMPANOMETRY: CPT | Performed by: AUDIOLOGIST

## 2022-10-20 ENCOUNTER — TELEPHONE (OUTPATIENT)
Dept: FAMILY MEDICINE CLINIC | Facility: CLINIC | Age: 67
End: 2022-10-20

## 2022-11-01 ENCOUNTER — APPOINTMENT (OUTPATIENT)
Dept: HEMATOLOGY/ONCOLOGY | Facility: HOSPITAL | Age: 67
End: 2022-11-01
Payer: MEDICARE

## 2022-11-02 ENCOUNTER — OFFICE VISIT (OUTPATIENT)
Dept: HEMATOLOGY/ONCOLOGY | Facility: HOSPITAL | Age: 67
End: 2022-11-02
Attending: INTERNAL MEDICINE
Payer: MEDICARE

## 2022-11-02 VITALS
HEIGHT: 62 IN | WEIGHT: 135.19 LBS | SYSTOLIC BLOOD PRESSURE: 128 MMHG | DIASTOLIC BLOOD PRESSURE: 64 MMHG | HEART RATE: 66 BPM | RESPIRATION RATE: 16 BRPM | TEMPERATURE: 99 F | OXYGEN SATURATION: 97 % | BODY MASS INDEX: 24.88 KG/M2

## 2022-11-02 DIAGNOSIS — Z12.31 SCREENING MAMMOGRAM, ENCOUNTER FOR: ICD-10-CM

## 2022-11-02 DIAGNOSIS — Z17.0 MALIGNANT NEOPLASM OF UPPER-INNER QUADRANT OF RIGHT BREAST IN FEMALE, ESTROGEN RECEPTOR POSITIVE (HCC): Primary | ICD-10-CM

## 2022-11-02 DIAGNOSIS — Z79.811 ENCOUNTER FOR MONITORING AROMATASE INHIBITOR THERAPY: ICD-10-CM

## 2022-11-02 DIAGNOSIS — T45.1X5A HOT FLASHES RELATED TO AROMATASE INHIBITOR THERAPY: ICD-10-CM

## 2022-11-02 DIAGNOSIS — C50.211 MALIGNANT NEOPLASM OF UPPER-INNER QUADRANT OF RIGHT BREAST IN FEMALE, ESTROGEN RECEPTOR POSITIVE (HCC): Primary | ICD-10-CM

## 2022-11-02 DIAGNOSIS — R23.2 HOT FLASHES RELATED TO AROMATASE INHIBITOR THERAPY: ICD-10-CM

## 2022-11-02 DIAGNOSIS — Z51.81 ENCOUNTER FOR MONITORING AROMATASE INHIBITOR THERAPY: ICD-10-CM

## 2022-11-02 DIAGNOSIS — N60.99 ATYPICAL LOBULAR HYPERPLASIA OF BREAST: ICD-10-CM

## 2022-11-02 PROCEDURE — 99214 OFFICE O/P EST MOD 30 MIN: CPT | Performed by: INTERNAL MEDICINE

## 2022-11-02 RX ORDER — LETROZOLE 2.5 MG/1
2.5 TABLET, FILM COATED ORAL DAILY
Qty: 90 TABLET | Refills: 3 | Status: SHIPPED | OUTPATIENT
Start: 2022-11-02

## 2023-01-03 RX ORDER — LOSARTAN POTASSIUM AND HYDROCHLOROTHIAZIDE 12.5; 1 MG/1; MG/1
1 TABLET ORAL DAILY
Qty: 90 TABLET | Refills: 3 | Status: SHIPPED | OUTPATIENT
Start: 2023-01-03

## 2023-02-25 ENCOUNTER — PATIENT MESSAGE (OUTPATIENT)
Dept: FAMILY MEDICINE CLINIC | Facility: CLINIC | Age: 68
End: 2023-02-25

## 2023-02-26 RX ORDER — LOSARTAN POTASSIUM AND HYDROCHLOROTHIAZIDE 12.5; 1 MG/1; MG/1
1 TABLET ORAL DAILY
Qty: 90 TABLET | Refills: 0 | Status: SHIPPED | OUTPATIENT
Start: 2023-02-26

## 2023-02-26 NOTE — TELEPHONE ENCOUNTER
Please review. Protocol failed / No protocol. Requested Prescriptions   Pending Prescriptions Disp Refills    Losartan Potassium-HCTZ 100-12.5 MG Oral Tab 90 tablet 0     Sig: Take 1 tablet by mouth daily. Hypertensive Medications Protocol Failed - 2/26/2023  1:06 PM        Failed - CMP or BMP in past 6 months     No results found for this or any previous visit (from the past 4392 hour(s)). Failed - In person appointment or virtual visit in the past 6 months     Recent Outpatient Visits              3 months ago Malignant neoplasm of upper-inner quadrant of right breast in female, estrogen receptor positive [facility])    [facility] Hematology Oncology Theodore Yanes MD    Office Visit    4 months ago Sensorineural hearing loss, bilateral    [address], Rona Allred Au.D    Office Visit    9 months ago Routine physical examination    [address] [facility], Tara Larkin MD    Office Visit    10 months ago Malignant neoplasm of upper-inner quadrant of right breast in female, estrogen receptor positive [facility])    [facility] Hematology Oncology Theodore Yanes MD    Office Visit    1 year ago Need for vaccination    [address] [place] [place],[address], [address], [address]    Nurse Only          Future Appointments         Provider Department Appt Notes    In 2 months Manjeet Pritchett 19 Hematology Oncology FOLLOW UP VISIT. CL  6M               Passed - In person appointment in the past 12 or next 3 months     Recent Outpatient Visits              3 months ago Malignant neoplasm of upper-inner quadrant of right breast in female, estrogen receptor positive [facility])    [facility] Hematology Oncology Theodore Yanes MD    Office Visit    4 months ago Sensorineural hearing loss, bilateral    [address], Rona Allred Au.D    Office Visit    9 months ago Routine physical examination    Darci Lazo [facility] Betsey Urena MD    Office Visit    10 months ago Malignant neoplasm of upper-inner quadrant of right breast in female, estrogen receptor positive [facility])    [facility] Hematology Oncology Ludin Najera MD    Office Visit    1 year ago Need for vaccination    [address], [address], [facility]    Nurse Only          Future Appointments         Provider Department Appt Notes    In 2 months Manjeet Blackwood 19 Hematology Oncology FOLLOW UP VISIT. CL  6M               Passed - Last BP reading less than 140/90     BP Readings from Last 1 Encounters:  11/02/22 : 128/64              Passed - EGFRCR or GFRNAA > 50     GFR Evaluation  EGFRCR: 96 , resulted on 8/3/2022               Recent Outpatient Visits              3 months ago Malignant neoplasm of upper-inner quadrant of right breast in female, estrogen receptor positive [facility])    [facility] Hematology Oncology Ludin Najera MD    Office Visit    4 months ago Sensorineural hearing loss, bilateral    Brigida Kim, Leonie Sacks, AuLUBNA    Office Visit    9 months ago Routine physical examination    Darci Lazo [place]    Office Visit    10 months ago Malignant neoplasm of upper-inner quadrant of right breast in female, estrogen receptor positive [facility])    [facility] Hematology Oncology Ludin Najera MD    Office Visit    1 year ago Need for vaccination    [address], [address], [facility]    Nurse Only            Future Appointments         Provider Department Appt Notes    In 2 months Manjeet Blackwood 19 Hematology Oncology FOLLOW UP VISIT. CL  6M

## 2023-03-02 RX ORDER — BUPROPION HYDROCHLORIDE 150 MG/1
150 TABLET ORAL DAILY
Qty: 90 TABLET | Refills: 3 | Status: CANCELLED | OUTPATIENT
Start: 2023-03-02

## 2023-03-03 RX ORDER — BUPROPION HYDROCHLORIDE 150 MG/1
150 TABLET ORAL DAILY
Qty: 90 TABLET | Refills: 0 | Status: SHIPPED | OUTPATIENT
Start: 2023-03-03

## 2023-03-03 NOTE — TELEPHONE ENCOUNTER
Refill passed per [facility] protocol. Requested Prescriptions   Pending Prescriptions Disp Refills    buPROPion  MG Oral Tablet 24 Hr 90 tablet 0     Sig: Take 1 tablet (150 mg total) by mouth daily. Psychiatric Non-Scheduled (Anti-Anxiety) Failed - 3/3/2023  9:36 AM        Failed - In person appointment or virtual visit in the past 6 mos or appointment in next 3 mos     Recent Outpatient Visits              4 months ago Malignant neoplasm of upper-inner quadrant of right breast in female, estrogen receptor positive [facility])    [facility] Hematology Oncology Jessica Valera MD    Office Visit    4 months ago Sensorineural hearing loss, bilateral    Brigida Antonio Simuel Grieve, Au.D    Office Visit    9 months ago Routine physical examination    Amy Antonio Exie Byes, MD    Office Visit    10 months ago Malignant neoplasm of upper-inner quadrant of right breast in female, estrogen receptor positive [facility])    [facility] Hematology Oncology Jessica Valera MD    Office Visit    1 year ago Need for vaccination    [address], [facility]    Nurse Only          Future Appointments         Provider Department Appt Notes    In 2 months Manjeet [address]Equivel 19 Hematology Oncology FOLLOW UP VISIT. CL  6M

## 2023-05-02 ENCOUNTER — OFFICE VISIT (OUTPATIENT)
Dept: HEMATOLOGY/ONCOLOGY | Facility: HOSPITAL | Age: 68
End: 2023-05-02
Attending: INTERNAL MEDICINE
Payer: MEDICARE

## 2023-05-02 VITALS
RESPIRATION RATE: 16 BRPM | WEIGHT: 136.81 LBS | TEMPERATURE: 98 F | HEIGHT: 62 IN | OXYGEN SATURATION: 98 % | DIASTOLIC BLOOD PRESSURE: 57 MMHG | BODY MASS INDEX: 25.18 KG/M2 | HEART RATE: 75 BPM | SYSTOLIC BLOOD PRESSURE: 111 MMHG

## 2023-05-02 DIAGNOSIS — T45.1X5A HOT FLASHES RELATED TO AROMATASE INHIBITOR THERAPY: ICD-10-CM

## 2023-05-02 DIAGNOSIS — N60.99 ATYPICAL LOBULAR HYPERPLASIA OF BREAST: ICD-10-CM

## 2023-05-02 DIAGNOSIS — Z17.0 MALIGNANT NEOPLASM OF UPPER-INNER QUADRANT OF RIGHT BREAST IN FEMALE, ESTROGEN RECEPTOR POSITIVE (HCC): Primary | ICD-10-CM

## 2023-05-02 DIAGNOSIS — M85.89 OSTEOPENIA OF MULTIPLE SITES: ICD-10-CM

## 2023-05-02 DIAGNOSIS — Z51.81 ENCOUNTER FOR MONITORING AROMATASE INHIBITOR THERAPY: ICD-10-CM

## 2023-05-02 DIAGNOSIS — C50.211 MALIGNANT NEOPLASM OF UPPER-INNER QUADRANT OF RIGHT BREAST IN FEMALE, ESTROGEN RECEPTOR POSITIVE (HCC): Primary | ICD-10-CM

## 2023-05-02 DIAGNOSIS — Z79.811 ENCOUNTER FOR MONITORING AROMATASE INHIBITOR THERAPY: ICD-10-CM

## 2023-05-02 DIAGNOSIS — Z12.31 SCREENING MAMMOGRAM, ENCOUNTER FOR: ICD-10-CM

## 2023-05-02 DIAGNOSIS — R23.2 HOT FLASHES RELATED TO AROMATASE INHIBITOR THERAPY: ICD-10-CM

## 2023-05-02 PROCEDURE — 99214 OFFICE O/P EST MOD 30 MIN: CPT | Performed by: INTERNAL MEDICINE

## 2023-05-03 ENCOUNTER — HOSPITAL ENCOUNTER (OUTPATIENT)
Dept: MAMMOGRAPHY | Age: 68
Discharge: HOME | End: 2023-05-03
Attending: INTERNAL MEDICINE
Payer: MEDICARE

## 2023-05-03 DIAGNOSIS — Z12.31 SCREENING MAMMOGRAM, ENCOUNTER FOR: ICD-10-CM

## 2023-05-03 PROCEDURE — 77067 SCR MAMMO BI INCL CAD: CPT | Performed by: INTERNAL MEDICINE

## 2023-05-03 PROCEDURE — 77063 BREAST TOMOSYNTHESIS BI: CPT | Performed by: INTERNAL MEDICINE

## 2023-05-22 NOTE — TELEPHONE ENCOUNTER
Please contact patient to schedule routine physical.  Send back to me if refills needed prior to visit.

## 2023-05-22 NOTE — TELEPHONE ENCOUNTER
Please review. Protocol failed / Has no protocol. Requested Prescriptions   Pending Prescriptions Disp Refills    LOSARTAN POTASSIUM-HCTZ 100-12.5 MG Oral Tab [Pharmacy Med Name: Elen Youngblood 100-12.5 MG TAB] 90 tablet 0     Sig: TAKE 1 TABLET BY MOUTH EVERY DAY       Hypertensive Medications Protocol Failed - 5/20/2023  7:30 AM        Failed - In person appointment in the past 12 or next 3 months     Recent Outpatient Visits              2 weeks ago Malignant neoplasm of upper-inner quadrant of right breast in female, estrogen receptor positive [facility])    [facility] Hematology Oncology Phillip Muñiz MD    Office Visit    6 months ago Malignant neoplasm of upper-inner quadrant of right breast in female, estrogen receptor positive [facility])    [facility] Hematology Oncology Phillip Muñiz MD    Office Visit    7 months ago Sensorineural hearing loss, bilateral    [facility], [address], Imer Santillan Au.D    Office Visit    1 year ago Routine physical examination    [employer], [address], Jamel Richard MD    Office Visit    1 year ago Malignant neoplasm of upper-inner quadrant of right breast in female, estrogen receptor positive [facility])    [facility] Hematology Oncology Phillip Muñiz MD    Office Visit          Future Appointments         Provider Department Appt Notes    In 5 months Manjeet [address] 19 Hematology Oncology FOLLOW UP VISIT. CL  6M               Failed - CMP or BMP in past 6 months     No results found for this or any previous visit (from the past 4392 hour(s)).               Failed - In person appointment or virtual visit in the past 6 months     Recent Outpatient Visits              2 weeks ago Malignant neoplasm of upper-inner quadrant of right breast in female, estrogen receptor positive [facility])    [facility] Hematology Oncology Phillip Muñiz MD    Office Visit    6 months ago Malignant neoplasm of upper-inner quadrant of right breast in female, estrogen receptor positive [facility])    [facility] Hematology Oncology Nathan Kenny MD    Office Visit    7 months ago Sensorineural hearing loss, bilateral    [facility], [address], Alina Paredes Caro.D    Office Visit    1 year ago Routine physical examination    [address], [address], Vincent Frye MD    Office Visit    1 year ago Malignant neoplasm of upper-inner quadrant of right breast in female, estrogen receptor positive [facility])    [facility] Hematology Oncology Nathan Kenny MD    Office Visit          Future Appointments         Provider Department Appt Notes    In 5 months Nathan Kenny Man[address]et [place] Hematology Oncology FOLLOW UP VISIT. CL  6M               Passed - Last BP reading less than 140/90     BP Readings from Last 1 Encounters:  05/02/23 : 111/57                Passed - EGFRCR or GFRNAA > 50     GFR Evaluation  EGFRCR: 96 , resulted on 8/3/2022               Future Appointments         Provider Department Appt Notes    In 5 months Nathan Kenny Manjeet [place] [address] Hematology Oncology FOLLOW UP VISIT. CL  6M           Recent Outpatient Visits              2 weeks ago Malignant neoplasm of upper-inner quadrant of right breast in female, estrogen receptor positive [facility])    [facility] Hematology Oncology Nathan Kenny MD    Office Visit    6 months ago Malignant neoplasm of upper-inner quadrant of right breast in female, estrogen receptor positive [facility])    [facility] Hematology Oncology Nathan Kenny MD    Office Visit    7 months ago Sensorineural hearing loss, bilateral    [facility], [address], Reggie Acuña Au.D    Office Visit    1 year ago Routine physical examination    [address] [facility] [address] [address], Vincent Frye MD    Office Visit    1 year ago Malignant neoplasm of upper-inner quadrant of right breast in female, estrogen receptor positive [facility])    [facility] Hematology Oncology Mao Castrejon MD    Office Visit

## 2023-05-24 RX ORDER — LOSARTAN POTASSIUM AND HYDROCHLOROTHIAZIDE 12.5; 1 MG/1; MG/1
1 TABLET ORAL DAILY
Qty: 90 TABLET | Refills: 3 | Status: SHIPPED | OUTPATIENT
Start: 2023-05-24

## 2023-05-24 NOTE — TELEPHONE ENCOUNTER
Please review. Protocol failed / No protocol. Appointment scheduled 6/13. Requested Prescriptions   Pending Prescriptions Disp Refills    LOSARTAN POTASSIUM-HCTZ 100-12.5 MG Oral Tab [Pharmacy Med Name: Sandor Spears 100-12.5 MG TAB] 90 tablet 0     Sig: TAKE 1 TABLET BY MOUTH EVERY DAY       Hypertensive Medications Protocol Failed - 5/24/2023  9:43 AM        Failed - CMP or BMP in past 6 months     No results found for this or any previous visit (from the past 4392 hour(s)). Failed - In person appointment or virtual visit in the past 6 months     Recent Outpatient Visits              3 weeks ago Malignant neoplasm of upper-inner quadrant of right breast in female, estrogen receptor positive [facility])    [facility] Hematology Oncology Jessica Valera MD    Office Visit    6 months ago Malignant neoplasm of upper-inner quadrant of right breast in female, estrogen receptor positive [facility])    [facility] Hematology Oncology Jessica Valera MD    Office Visit    7 months ago Sensorineural hearing loss, bilateral    [address] [place], [place] [address]Brigida Simuel Grieve, Au.D    Office Visit    1 year ago Routine physical examination    [address] [place] [address] Alexander Pena MD    Office Visit    1 year ago Malignant neoplasm of upper-inner quadrant of right breast in female, estrogen receptor positive [facility])    [facility] Hematology Oncology Jessica Valera MD    Office Visit          Future Appointments         Provider Department Appt Notes    In 2 weeks Alexander Pena MD [address] [place] [address] px, last px: 28/81/66, policy informed    In 5 months Manjeet [address] 19 Hematology Oncology FOLLOW UP VISIT. CL  6M               Passed - In person appointment in the past 12 or next 3 months     Recent Outpatient Visits              3 weeks ago Malignant neoplasm of upper-inner quadrant of right breast in female, estrogen receptor positive [facility])    [facility] Hematology Oncology Jessica Valera MD    Office Visit    6 months ago Malignant neoplasm of upper-inner quadrant of right breast in female, estrogen receptor positive [facility])    [facility] Hematology Oncology Jessica Valera MD    Office Visit    7 months ago Sensorineural hearing loss, bilateral    Brigida Rogers Simuel Grieve, Au.D    Office Visit    1 year ago [facility]ine physical examination    [facility]    Office Visit    1 year ago Malignant neoplasm of upper-inner quadrant of right breast in female, estrogen receptor positive [facility])    [facility] Hematology Oncology Jessica Valera MD    Office Visit          Future Appointments         Provider Department Appt Notes    In 2 weeks MD Tesha Bryant [facility] px, last px: 40/73/97, policy informed    In 5 months Manjeet [address] 19 Hematology Oncology FOLLOW UP VISIT. CL  6M               Passed - Last BP reading less than 140/90     BP Readings from Last 1 Encounters:  05/02/23 : 111/57                Passed - EGFRCR or GFRNAA > 50     GFR Evaluation  EGFRCR: 96 , resulted on 8/3/2022                 Recent Outpatient Visits              3 weeks ago Malignant neoplasm of upper-inner quadrant of right breast in female, estrogen receptor positive [facility])    [facility] Hematology Oncology Jessica Valera MD    Office Visit    6 months ago Malignant neoplasm of upper-inner quadrant of right breast in female, estrogen receptor positive [facility])    [facility] Hematology Oncology Jessica Valera MD    Office Visit    7 months ago Sensorineural hearing loss, bilateral    Elvira Magallanes, [address], Asuncion Fierro    Office Visit    1 year ago Routine physical examination    [facility], [address], [facility] [address] Francois Connor MD    Office Visit    1 year ago Malignant neoplasm of upper-inner quadrant of right breast in female, estrogen receptor positive [facility])    [facility] Hematology Oncology Dina Andrade MD    Office Visit            Future Appointments         Provider Department Appt Notes    In 2 weeks Francois Connor MD [address] [facility], [facility] [address] px, last px: 30/49/06, policy informed    In 5 [facility] Hematology Oncology FOLLOW UP VISIT. CL  6M

## 2023-06-13 ENCOUNTER — OFFICE VISIT (OUTPATIENT)
Dept: FAMILY MEDICINE CLINIC | Facility: CLINIC | Age: 68
End: 2023-06-13

## 2023-06-13 ENCOUNTER — LAB ENCOUNTER (OUTPATIENT)
Dept: LAB | Age: 68
End: 2023-06-13
Attending: FAMILY MEDICINE
Payer: MEDICARE

## 2023-06-13 VITALS
SYSTOLIC BLOOD PRESSURE: 125 MMHG | HEART RATE: 78 BPM | HEIGHT: 61.42 IN | WEIGHT: 135.13 LBS | BODY MASS INDEX: 25.18 KG/M2 | DIASTOLIC BLOOD PRESSURE: 74 MMHG | TEMPERATURE: 98 F

## 2023-06-13 DIAGNOSIS — Z17.0 MALIGNANT NEOPLASM OF UPPER-INNER QUADRANT OF RIGHT BREAST IN FEMALE, ESTROGEN RECEPTOR POSITIVE (HCC): ICD-10-CM

## 2023-06-13 DIAGNOSIS — J45.40 MODERATE PERSISTENT ASTHMA WITHOUT COMPLICATION: Chronic | ICD-10-CM

## 2023-06-13 DIAGNOSIS — M85.89 OSTEOPENIA OF MULTIPLE SITES: ICD-10-CM

## 2023-06-13 DIAGNOSIS — J30.89 NON-SEASONAL ALLERGIC RHINITIS, UNSPECIFIED TRIGGER: Chronic | ICD-10-CM

## 2023-06-13 DIAGNOSIS — Z00.00 ENCOUNTER FOR ANNUAL PHYSICAL EXAM: Primary | ICD-10-CM

## 2023-06-13 DIAGNOSIS — F33.41 RECURRENT MAJOR DEPRESSIVE DISORDER, IN PARTIAL REMISSION (HCC): Chronic | ICD-10-CM

## 2023-06-13 DIAGNOSIS — I10 ESSENTIAL HYPERTENSION: ICD-10-CM

## 2023-06-13 DIAGNOSIS — C50.211 MALIGNANT NEOPLASM OF UPPER-INNER QUADRANT OF RIGHT BREAST IN FEMALE, ESTROGEN RECEPTOR POSITIVE (HCC): ICD-10-CM

## 2023-06-13 DIAGNOSIS — Z00.00 ENCOUNTER FOR ANNUAL HEALTH EXAMINATION: ICD-10-CM

## 2023-06-13 DIAGNOSIS — Z86.010 HISTORY OF COLON POLYPS: ICD-10-CM

## 2023-06-13 RX ORDER — KETOTIFEN FUMARATE 0.35 MG/ML
1 SOLUTION/ DROPS OPHTHALMIC AS DIRECTED
COMMUNITY

## 2023-06-13 RX ORDER — SODIUM CHLORIDE 20 MG/ML
SOLUTION OPHTHALMIC
COMMUNITY

## 2023-06-14 LAB
ABSOLUTE BASOPHILS: 50 CELLS/UL (ref 0–200)
ABSOLUTE EOSINOPHILS: 626 CELLS/UL (ref 15–500)
ABSOLUTE LYMPHOCYTES: 1383 CELLS/UL (ref 850–3900)
ABSOLUTE MONOCYTES: 397 CELLS/UL (ref 200–950)
ABSOLUTE NEUTROPHILS: 3745 CELLS/UL (ref 1500–7800)
ALBUMIN/GLOBULIN RATIO: 1.7 (CALC) (ref 1–2.5)
ALBUMIN: 4.5 G/DL (ref 3.6–5.1)
ALKALINE PHOSPHATASE: 91 U/L (ref 37–153)
ALT: 21 U/L (ref 6–29)
AST: 21 U/L (ref 10–35)
BASOPHILS: 0.8 %
BILIRUBIN, TOTAL: 0.5 MG/DL (ref 0.2–1.2)
BUN: 18 MG/DL (ref 7–25)
CALCIUM: 9.5 MG/DL (ref 8.6–10.4)
CARBON DIOXIDE: 27 MMOL/L (ref 20–32)
CHLORIDE: 104 MMOL/L (ref 98–110)
CHOL/HDLC RATIO: 3.1 (CALC)
CHOLESTEROL, TOTAL: 246 MG/DL
CREATININE: 0.84 MG/DL (ref 0.5–1.05)
EGFR: 77 ML/MIN/1.73M2
EOSINOPHILS: 10.1 %
GLOBULIN: 2.6 G/DL (CALC) (ref 1.9–3.7)
GLUCOSE: 106 MG/DL (ref 65–99)
HDL CHOLESTEROL: 79 MG/DL
HEMATOCRIT: 39.1 % (ref 35–45)
HEMOGLOBIN: 13.2 G/DL (ref 11.7–15.5)
LDL-CHOLESTEROL: 134 MG/DL (CALC)
LYMPHOCYTES: 22.3 %
MCH: 29.3 PG (ref 27–33)
MCHC: 33.8 G/DL (ref 32–36)
MCV: 86.9 FL (ref 80–100)
MONOCYTES: 6.4 %
MPV: 10.9 FL (ref 7.5–12.5)
NEUTROPHILS: 60.4 %
NON-HDL CHOLESTEROL: 167 MG/DL (CALC)
PLATELET COUNT: 220 THOUSAND/UL (ref 140–400)
POTASSIUM: 3.9 MMOL/L (ref 3.5–5.3)
PROTEIN, TOTAL: 7.1 G/DL (ref 6.1–8.1)
RDW: 13.3 % (ref 11–15)
RED BLOOD CELL COUNT: 4.5 MILLION/UL (ref 3.8–5.1)
SODIUM: 141 MMOL/L (ref 135–146)
TRIGLYCERIDES: 192 MG/DL
WHITE BLOOD CELL COUNT: 6.2 THOUSAND/UL (ref 3.8–10.8)

## 2023-09-01 ENCOUNTER — NURSE TRIAGE (OUTPATIENT)
Dept: FAMILY MEDICINE CLINIC | Facility: CLINIC | Age: 68
End: 2023-09-01

## 2023-09-17 DIAGNOSIS — F33.41 RECURRENT MAJOR DEPRESSIVE DISORDER, IN PARTIAL REMISSION (HCC): Chronic | ICD-10-CM

## 2023-09-18 RX ORDER — BUPROPION HYDROCHLORIDE 150 MG/1
150 TABLET ORAL DAILY
Qty: 90 TABLET | Refills: 3 | OUTPATIENT
Start: 2023-09-18

## 2023-11-02 ENCOUNTER — OFFICE VISIT (OUTPATIENT)
Dept: HEMATOLOGY/ONCOLOGY | Facility: HOSPITAL | Age: 68
End: 2023-11-02
Attending: INTERNAL MEDICINE
Payer: MEDICARE

## 2023-11-02 VITALS
TEMPERATURE: 98 F | WEIGHT: 134 LBS | HEIGHT: 62 IN | OXYGEN SATURATION: 97 % | HEART RATE: 72 BPM | RESPIRATION RATE: 16 BRPM | DIASTOLIC BLOOD PRESSURE: 70 MMHG | SYSTOLIC BLOOD PRESSURE: 133 MMHG | BODY MASS INDEX: 24.66 KG/M2

## 2023-11-02 DIAGNOSIS — M85.89 OSTEOPENIA OF MULTIPLE SITES: ICD-10-CM

## 2023-11-02 DIAGNOSIS — Z17.0 MALIGNANT NEOPLASM OF UPPER-INNER QUADRANT OF RIGHT BREAST IN FEMALE, ESTROGEN RECEPTOR POSITIVE: Primary | ICD-10-CM

## 2023-11-02 DIAGNOSIS — C50.211 MALIGNANT NEOPLASM OF UPPER-INNER QUADRANT OF RIGHT BREAST IN FEMALE, ESTROGEN RECEPTOR POSITIVE: Primary | ICD-10-CM

## 2023-11-02 DIAGNOSIS — Z51.81 ENCOUNTER FOR MONITORING AROMATASE INHIBITOR THERAPY: ICD-10-CM

## 2023-11-02 DIAGNOSIS — T45.1X5A HOT FLASHES RELATED TO AROMATASE INHIBITOR THERAPY: ICD-10-CM

## 2023-11-02 DIAGNOSIS — Z12.31 SCREENING MAMMOGRAM, ENCOUNTER FOR: ICD-10-CM

## 2023-11-02 DIAGNOSIS — R23.2 HOT FLASHES RELATED TO AROMATASE INHIBITOR THERAPY: ICD-10-CM

## 2023-11-02 DIAGNOSIS — Z79.811 ENCOUNTER FOR MONITORING AROMATASE INHIBITOR THERAPY: ICD-10-CM

## 2023-11-02 PROCEDURE — 99214 OFFICE O/P EST MOD 30 MIN: CPT | Performed by: INTERNAL MEDICINE

## 2023-11-02 RX ORDER — LETROZOLE 2.5 MG/1
2.5 TABLET, FILM COATED ORAL DAILY
Qty: 90 TABLET | Refills: 1 | Status: SHIPPED | OUTPATIENT
Start: 2023-11-02

## 2023-12-08 NOTE — TELEPHONE ENCOUNTER
Jerrell Miller was calling regarding vitamins she talked about at last appt. She would like to know the dosage.  Please advise thanks    [facility]      SUBJECTIVE  Physical therapy orders received.  Discussed current status with RN and then pt.  Pt was pivot to wheel chair prior to hospitalization back in 9/2023.  Significant loss of strength and function with danelle since that time with exception of one sit to stand last week.  Pt is currently at most recent baseline.  Will hold on evaluation at this time. Discussed same with pt and verbalized understanding.      OBJECTIVE                         Therapy procedure time and total treatment time can be found documented on the Time Entry flowsheet

## 2024-01-16 ENCOUNTER — HOSPITAL ENCOUNTER (OUTPATIENT)
Dept: BONE DENSITY | Facility: HOSPITAL | Age: 69
Discharge: HOME | End: 2024-01-16
Attending: INTERNAL MEDICINE
Payer: MEDICARE

## 2024-01-16 DIAGNOSIS — M85.89 OSTEOPENIA OF MULTIPLE SITES: ICD-10-CM

## 2024-01-16 PROCEDURE — 77080 DXA BONE DENSITY AXIAL: CPT | Performed by: INTERNAL MEDICINE

## 2024-04-08 ENCOUNTER — HOSPITAL ENCOUNTER (OUTPATIENT)
Age: 69
Discharge: HOME | End: 2024-04-08
Payer: MEDICARE

## 2024-04-08 VITALS
DIASTOLIC BLOOD PRESSURE: 62 MMHG | RESPIRATION RATE: 20 BRPM | OXYGEN SATURATION: 100 % | HEART RATE: 69 BPM | TEMPERATURE: 98 F | SYSTOLIC BLOOD PRESSURE: 106 MMHG

## 2024-04-08 DIAGNOSIS — L03.032 CELLULITIS OF LEFT TOE: Primary | ICD-10-CM

## 2024-04-08 PROCEDURE — 99213 OFFICE O/P EST LOW 20 MIN: CPT | Performed by: NURSE PRACTITIONER

## 2024-04-08 RX ORDER — NAPROXEN 500 MG/1
500 TABLET ORAL 2 TIMES DAILY PRN
Qty: 20 TABLET | Refills: 0 | Status: SHIPPED | OUTPATIENT
Start: 2024-04-08 | End: 2024-04-18

## 2024-04-08 RX ORDER — CEPHALEXIN 500 MG/1
500 CAPSULE ORAL 3 TIMES DAILY
Qty: 21 CAPSULE | Refills: 0 | Status: SHIPPED | OUTPATIENT
Start: 2024-04-08 | End: 2024-04-15

## 2024-04-08 NOTE — ED INITIAL ASSESSMENT (HPI)
Pt states having body aches, lost her voice and a lot of sinus congestion with coughing. Pt states has been fatigued as well. Pt states symptoms began this past Thursday pt states hx of sinus infections.     Pt states having pain in the big toe of right foot after she cut the nail . Pt concerned for infection.

## 2024-04-08 NOTE — ED PROVIDER NOTES
Patient Seen in: [facility]      History   No chief complaint on file.    Stated Complaint: sinus infection / toe infection    Subjective:   HPI  Patient is a 68-year-old female who presents to the immediate care center with 2 distinct concerns.  Her primary concern is for redness and swelling to the lateral aspect of her right great toe.  This is been persistent for several days after trimming her toenail.  She notices there is been purulent drainage and the toe has been crusted to her sock.  She denies motor or sensory deficit.    She has secondary concern and that she has had nasal congestion, sore throat, sinus pressure, and mild cough for the last 4 days.  She has been using a [place] pot at home (without distilled or purified water).  She denies known illness exposure.  She has had no headache or dizziness; no chest pain or shortness of air.        Objective:   Past Medical History:   Diagnosis Date    Anxiety state     Ashkenazi Orthodox ancestry requiring population-specific genetic screening     Ashkenazi Orthodox ancestry requiring population-specific screening for genetic disorder 4/22/2019    Asthma (HCC)     Atypical lobular hyperplasia of breast     Atypical lobular hyperplasia of breast 4/26/2012    Depression     Diverticulosis of colon 2008    DJD (degenerative joint disease) of hip 2010    DJD hips and lumbar. Physical therapy    DJD (degenerative joint disease), lumbar 2010    DJD hips and lumbar. Physical therapy    Eczema     Encounter for monitoring aromatase inhibitor therapy 1/21/2020    Esophageal reflux     Essential hypertension 2017    controlled with Rx    Exposure to medical diagnostic radiation     Extrinsic asthma, unspecified     First degree AV block 2013    Gastroesophageal reflux disease without esophagitis 10/26/2015    High blood pressure     Lymphadenopathy, axillary 10/7/2020    Malignant neoplasm of upper-inner quadrant of right breast in female, estrogen receptor  positive (HCC) 4/22/2019    Migraines     Sinusitis               Past Surgical History:   Procedure Laterality Date    BONE DENSITY TEST SCAN  2012    5/10/2012 normal bone mineral density    BREAST BIOPSY Right 2012    [facility]    COLONOSCOPY  2008    asymptomatic diverticulosis on colonoscopy    COLONOSCOPY N/A 1/8/2020    Procedure: COLONOSCOPY;  Surgeon: Kwame Wagner MD;  Location: [facility]    ELECTROCARDIOGRAM, COMPLETE  4/12/2013    scanned to media tab    LUMPECTOMY RIGHT Right 05/06/2019    BOLA LOCALIZATION WIRE 1 SITE RIGHT (CPT=19281) Right     RADIATION RIGHT Right 2019    SKIN SURGERY  2005    scalp cyst ex x 3    SKIN SURGERY  1/16/2015    excision scalp cysts x2                Social History     Socioeconomic History    Marital status:     Number of children: 2   Occupational History    Occupation:    Tobacco Use    Smoking status: Never    Smokeless tobacco: Never   Vaping Use    Vaping Use: Never used   Substance and Sexual Activity    Alcohol use: Yes     Comment: 4 drinks weekly    Drug use: Never   Other Topics Concern    Caffeine Concern Yes     Comment: coffee, 1 cup              Review of Systems   Constitutional:  Negative for appetite change, chills and fever.   HENT:  Positive for congestion, sinus pressure, sinus pain, sore throat and voice change.    Respiratory:  Positive for cough. Negative for shortness of breath.    Cardiovascular:  Negative for chest pain.   Musculoskeletal:  Negative for arthralgias and myalgias.   Skin:         Redness to the lateral aspect of her left great toe       Positive for stated complaint: sinus infection / toe infection  Other systems are as noted in HPI.  Constitutional and vital signs reviewed.      All other systems reviewed and negative except as noted above.    Physical Exam     ED Triage Vitals [04/08/24 1203]   /62   Pulse 69   Resp 20   Temp 97.8 °F (36.6 °C)   Temp src Temporal   SpO2 100 %   O2 Device None (Room air)        Current:/62   Pulse 69   Temp 97.8 °F (36.6 °C) (Temporal)   Resp 20   LMP  (LMP Unknown)   SpO2 100%         Physical Exam  Vitals and nursing note reviewed.   Constitutional:       General: She is not in acute distress.     Appearance: She is not ill-appearing.   HENT:      Head: Normocephalic.      Nose: Congestion present.   Eyes:      Conjunctiva/sclera: Conjunctivae normal.   Pulmonary:      Effort: Pulmonary effort is normal. No respiratory distress.   Musculoskeletal:      Cervical back: Normal range of motion and neck supple.        Feet:    Neurological:      Mental Status: She is alert and oriented to person, place, and time.   Psychiatric:         Behavior: Behavior normal.               ED Course   Labs Reviewed - No data to display                   MDM      Differential diagnoses were reviewed with patient at bedside prior to disposition.  She was informed that her sinus infection is likely viral.  She was encouraged to use over-the-counter Flonase as directed.  She was also educated to the importance of using distilled water and her [place] pot.    She was informed we will start her on antibiotic for her toe as there is significant redness to the area to treat potential infection that may have been caused by trimming of her nail.  She will follow with her primary care provider next week if symptoms continue.                                 Medical Decision Making  Differential diagnoses considered included, but are not exclusive of: bacterial vs viral sinusitis, dehydration, pneumonia, influenza, Covid-19 infection, and other viral upper respiratory infection.     Differential diagnoses considered today include, but are not exclusive of: Cellulitis, paronychia, felon; localized inflammatory response.    Problems Addressed:  Cellulitis of left toe: self-limited or minor problem    Risk  OTC drugs.  Prescription drug management.        Disposition and Plan     Clinical Impression:  1.  Cellulitis of left toe         Disposition:  Discharge  4/8/2024 12:52 pm    Follow-up:  Talita Lyon MD  [address] [place] [address]  [phone]    Schedule an appointment as soon as possible for a visit in 1 week  As needed          Medications Prescribed:  Current Discharge Medication List        START taking these medications    Details   cephalexin 500 MG Oral Cap Take 1 capsule (500 mg total) by mouth 3 (three) times daily for 7 days.  Qty: 21 capsule, Refills: 0      naproxen 500 MG Oral Tab Take 1 tablet (500 mg total) by mouth 2 (two) times daily as needed.  Qty: 20 tablet, Refills: 0

## 2024-04-15 RX ORDER — GABAPENTIN 300 MG/1
300 CAPSULE ORAL NIGHTLY
Qty: 90 CAPSULE | Refills: 3 | Status: SHIPPED | OUTPATIENT
Start: 2024-04-15 | End: 2024-04-16

## 2024-04-16 ENCOUNTER — TELEPHONE (OUTPATIENT)
Dept: HEMATOLOGY/ONCOLOGY | Facility: HOSPITAL | Age: 69
End: 2024-04-16

## 2024-04-16 RX ORDER — GABAPENTIN 300 MG/1
300 CAPSULE ORAL NIGHTLY
Qty: 90 CAPSULE | Refills: 3 | Status: SHIPPED | OUTPATIENT
Start: 2024-04-16

## 2024-04-16 NOTE — TELEPHONE ENCOUNTER
Pt is calling to request that her Gabapentin be sent to [facility] in [place]. She no longer has optum-NL

## 2024-04-16 NOTE — TELEPHONE ENCOUNTER
Prescription sent to new pharmacy per patient request. Patient notified to call pharmacy to advise on  time. Patient verbalized understanding.

## 2024-05-13 ENCOUNTER — OFFICE VISIT (OUTPATIENT)
Dept: HEMATOLOGY/ONCOLOGY | Facility: HOSPITAL | Age: 69
End: 2024-05-13
Attending: INTERNAL MEDICINE
Payer: MEDICARE

## 2024-05-13 VITALS
RESPIRATION RATE: 16 BRPM | HEART RATE: 70 BPM | DIASTOLIC BLOOD PRESSURE: 67 MMHG | HEIGHT: 62 IN | SYSTOLIC BLOOD PRESSURE: 120 MMHG | WEIGHT: 134 LBS | OXYGEN SATURATION: 98 % | BODY MASS INDEX: 24.66 KG/M2 | TEMPERATURE: 99 F

## 2024-05-13 DIAGNOSIS — Z85.3 ENCOUNTER FOR FOLLOW-UP SURVEILLANCE OF BREAST CANCER: ICD-10-CM

## 2024-05-13 DIAGNOSIS — Z51.81 ENCOUNTER FOR MONITORING AROMATASE INHIBITOR THERAPY: ICD-10-CM

## 2024-05-13 DIAGNOSIS — N60.99 ATYPICAL LOBULAR HYPERPLASIA OF BREAST: ICD-10-CM

## 2024-05-13 DIAGNOSIS — C50.211 MALIGNANT NEOPLASM OF UPPER-INNER QUADRANT OF RIGHT BREAST IN FEMALE, ESTROGEN RECEPTOR POSITIVE (HCC): Primary | ICD-10-CM

## 2024-05-13 DIAGNOSIS — Z17.0 MALIGNANT NEOPLASM OF UPPER-INNER QUADRANT OF RIGHT BREAST IN FEMALE, ESTROGEN RECEPTOR POSITIVE (HCC): Primary | ICD-10-CM

## 2024-05-13 DIAGNOSIS — R23.2 HOT FLASHES RELATED TO AROMATASE INHIBITOR THERAPY: ICD-10-CM

## 2024-05-13 DIAGNOSIS — Z79.811 ENCOUNTER FOR MONITORING AROMATASE INHIBITOR THERAPY: ICD-10-CM

## 2024-05-13 DIAGNOSIS — Z08 ENCOUNTER FOR FOLLOW-UP SURVEILLANCE OF BREAST CANCER: ICD-10-CM

## 2024-05-13 DIAGNOSIS — T45.1X5A HOT FLASHES RELATED TO AROMATASE INHIBITOR THERAPY: ICD-10-CM

## 2024-05-13 DIAGNOSIS — Z12.31 SCREENING MAMMOGRAM, ENCOUNTER FOR: ICD-10-CM

## 2024-05-13 DIAGNOSIS — M85.89 OSTEOPENIA OF MULTIPLE SITES: ICD-10-CM

## 2024-05-13 PROCEDURE — G2211 COMPLEX E/M VISIT ADD ON: HCPCS | Performed by: INTERNAL MEDICINE

## 2024-05-13 PROCEDURE — 99214 OFFICE O/P EST MOD 30 MIN: CPT | Performed by: INTERNAL MEDICINE

## 2024-05-13 NOTE — PROGRESS NOTES
GRISELDA     Carrie Byrne is a 68 year old female who is here today for follow-up of diagnosis of   Encounter Diagnoses   Name Primary?    Malignant neoplasm of upper-inner quadrant of right breast in female, estrogen receptor positive (HCC) Yes    Encounter for monitoring aromatase inhibitor therapy     Encounter for follow-up surveillance of breast cancer     Screening mammogram, encounter for     Osteopenia of multiple sites     Hot flashes related to aromatase inhibitor therapy     Atypical lobular hyperplasia of breast         She is compliant with the letrozole.      Still c/o hot flashes.  States itching keeps her up at night than hot flashes.    No changes on SBE.        Good ROM of the RUE and no edema.      Keeping active.      She is taking calcium and vitamin D and additional vitamin D as recommended.     ECOG PS 0      Review of Systems:     Review of Systems   Constitutional:  Negative for appetite change, fatigue and unexpected weight change.   HENT:           Has URI symptoms   Eyes:  Positive for eye problems (R eye corneal errosion).   Respiratory:  Positive for shortness of breath. Negative for cough.    Cardiovascular:  Negative for chest pain.   Gastrointestinal:  Negative for abdominal pain.   Endocrine: Positive for hot flashes.   Musculoskeletal:  Positive for arthralgias (hands, L elbow pain and achilles tendon in the am is stiff.). Negative for back pain and neck pain.        States tension on the upper back   Skin:  Positive for itching.   Neurological:  Negative for dizziness and headaches.   Hematological:  Negative for adenopathy.   Psychiatric/Behavioral:  Positive for sleep disturbance.         States at times does not feel like doing anything.         Current Outpatient Medications   Medication Sig Dispense Refill    letrozole 2.5 MG Oral Tab Take 1 tablet (2.5 mg total) by mouth daily. 90 tablet 0    gabapentin 300 MG Oral Cap Take 1 capsule (300 mg total) by mouth nightly. 90  capsule 3    Sodium Chloride, Hypertonic, (MANOJ 128) 2 % Ophthalmic Solution       sertraline 100 MG Oral Tab Take 1 tablet (100 mg total) by mouth every morning. 90 tablet 3    montelukast 10 MG Oral Tab Take 1 tablet (10 mg total) by mouth nightly. 90 tablet 3    Losartan Potassium-HCTZ 100-12.5 MG Oral Tab Take 1 tablet by mouth daily. 90 tablet 3    albuterol (PROAIR HFA) 108 (90 Base) MCG/ACT Inhalation Aero Soln Inhale 2 puffs into the lungs every 4 (four) hours as needed for Wheezing. 3 each 3    Zaleplon (SONATA) 5 MG Oral Cap Take 1 capsule (5 mg total) by mouth nightly. As needed for insomnia. 30 capsule 1    LORazepam 0.5 MG Oral Tab Take 1 tablet (0.5 mg total) by mouth every 6 (six) hours as needed for Anxiety. 20 tablet 0    FLOVENT  MCG/ACT Inhalation Aerosol INHALE 2 PUFFS BY MOUTH INTO THE LUNGS DAILY 36 Inhaler 3    MAGNESIUM OR Take by mouth daily.      Calcium Carbonate-Vitamin D (CALTRATE 600+D OR) Take by mouth daily.      Tacrolimus 0.03 % External Ointment BENI TO FACIAL RASH BID  0    Fexofenadine HCl (ALLEGRA) 60 MG Oral Tab Take  by mouth.       Allergies:   Allergies   Allergen Reactions    Sulfa Antibiotics ANAPHYLAXIS     Other reaction(s): anaphlaxsis    Augmentin [Amoxicillin-Pot Clavulanate] NAUSEA AND VOMITING    Fish OTHER (SEE COMMENTS)     Other reaction(s): Facial Swelling    Adhesive Tape (Rosins) RASH     From Tegaderm dressing after breast surgery       Past Medical History:    Anxiety state    Ashkenazi [place]anda[place] ancestry requiring population-specific genetic screening    Ashkenazi Mandaen ancestry requiring population-specific screening for genetic disorder    Asthma (HCC)    Atypical lobular hyperplasia of breast    Atypical lobular hyperplasia of breast    Depression    Diverticulosis of colon    DJD (degenerative joint disease) of hip    DJD hips and lumbar. Physical therapy    DJD (degenerative joint disease), lumbar    DJD hips and lumbar. Physical therapy    Eczema     Encounter for monitoring aromatase inhibitor therapy    Esophageal reflux    Essential hypertension    controlled with Rx    Exposure to medical diagnostic radiation    Extrinsic asthma, unspecified    First degree AV block    Gastroesophageal reflux disease without esophagitis    High blood pressure    Lymphadenopathy, axillary    Malignant neoplasm of upper-inner quadrant of right breast in female, estrogen receptor positive (HCC)    Migraines    Sinusitis     Past Surgical History:   Procedure Laterality Date    Bone density test scan  2012    5/10/2012 normal bone mineral density    Breast biopsy Right 2012    [facility]    Colonoscopy  2008    asymptomatic diverticulosis on colonoscopy    Colonoscopy N/A 1/8/2020    Procedure: COLONOSCOPY;  Surgeon: Kwame Wagner MD;  Location: [facility]    Electrocardiogram, complete  4/12/2013    scanned to media tab    Lumpectomy right Right 05/06/2019    Zane localization wire 1 site right (cpt=19281) Right     Radiation right Right 2019    Skin surgery  2005    scalp cyst ex x 3    Skin surgery  1/16/2015    excision scalp cysts x2     Social History     Socioeconomic History    Marital status:     Number of children: 2   Occupational History    Occupation:    Tobacco Use    Smoking status: Never    Smokeless tobacco: Never   Vaping Use    Vaping status: Never Used   Substance and Sexual Activity    Alcohol use: Yes     Comment: 4 drinks weekly    Drug use: Never   Other Topics Concern    Caffeine Concern Yes     Comment: coffee, 1 cup       Family History   Problem Relation Age of Onset    Hypertension Father     Heart Disorder Father     Diabetes Mother     Allergies Daughter     Allergies Sister     Cancer Brother 73        slow progressing prostate    Cancer Maternal Aunt         unknown primary    Cancer Paternal Uncle 85        unknown type    Breast Cancer Self 63    Ovarian Cancer Neg          PHYSICAL EXAM:    /67 (BP Location: Left arm, Patient  Position: Sitting, Cuff Size: adult)   Pulse 70   Temp 98.5 °F (36.9 °C) (Oral)   Resp 16   Ht 1.575 m (5' 2\")   Wt 60.8 kg (134 lb)   LMP  (LMP Unknown)   SpO2 98%   BMI 24.51 kg/m²   Wt Readings from Last 6 Encounters:   05/13/24 60.8 kg (134 lb)   11/02/23 60.8 kg (134 lb)   06/13/23 61.3 kg (135 lb 2 oz)   05/02/23 62.1 kg (136 lb 12.8 oz)   11/02/22 61.3 kg (135 lb 3.2 oz)   05/17/22 60 kg (132 lb 3.2 oz)     General: Patient is alert and oriented x 3, not in acute distress.  HEENT: EOMs intact. PERRL.   Neck: No JVD. No palpable lymphadenopathy. Neck is supple.  Chest: Clear to auscultation.  Breasts: R breast with surgical scar, axillary scar palpable, no masses.  L breast no masses.   Heart: Regular rate and rhythm.   Abdomen: Soft, non tender with good bowel sounds.  Extremities: No edema.  Neurological: Grossly intact.   Lymphatics: There is no palpable lymphadenopathy throughout in the cervical, supraclavicular, axillary or inguinal regions.  Psych/Depression: normal        ASSESSMENT/PLAN:     Encounter Diagnoses   Name Primary?    Malignant neoplasm of upper-inner quadrant of right breast in female, estrogen receptor positive (HCC) Yes    Encounter for monitoring aromatase inhibitor therapy     Encounter for follow-up surveillance of breast cancer     Screening mammogram, encounter for     Osteopenia of multiple sites     Hot flashes related to aromatase inhibitor therapy     Atypical lobular hyperplasia of breast         Cancer Staging  Malignant neoplasm of upper-inner quadrant of right breast in female, estrogen receptor positive (HCC)  Staging form: Breast, AJCC 8th Edition  - Pathologic stage from 5/14/2019: Stage IA (pT1a, pN0(sn), cM0, G1, ER+, RI+, HER2-) - Signed by Noel Murdock MD on 5/14/2019      Patient has completed breast conservation with a lumpectomy on 5/6/2019 and completed adjuvant radiation therapy on 6/28/2019.    On adjuvant AI for 5 yrs until July of 2024.    AI hot  flashes:  No longer on gabapentin as worried about risk of dementia.  Reviewed literature with the patient, discussed that this is safe to use.  She will take at bedtime for hot flashes and sleep.    Mammogram with last screening on 5/3/2023,  BI-RADS 2, this will be due in 12 months.  Scheduled for 6/25/24.    Baseline DEXA prior to start of AI was performed on 7/11/2019.  This showed, however interval increase in bone mineral density at the lumbar, total hip and femoral and  January of 2024.  Repeat  in January of 2026, she is encouraged to continue with supplementation of vitamin D and calcium.  Recommend weightbearing exercise.      No follow-ups on file.    MDM: moderate risk.  Continuity of complex medical care  No orders of the defined types were placed in this encounter.      Results From Past 48 Hours:  No results found for this or any previous visit (from the past 48 hour(s)).      Imaging & Referrals:  None   No orders of the defined types were placed in this encounter.    Narrative  PROCEDURE: XR DEXA BONE DENSITOMETRY (CPT=77080)     COMPARISON: [facility], XR DEXA BONE DENSITOMETRY (CPT=77080), 12/23/2021, 12:30 PM.     INDICATIONS: M85.89 Osteopenia of multiple sites     TECHNIQUE: Measurement of bone mineral density of the lumbar spine and hip was performed on a Hologic dual energy x-ray absorptiometry scanner.     FINDINGS:     LEFT FEMORAL NECK  BMD: 0.794 gm/sq. cm. T SCORE: -0.5 Z SCORE: 1.1     LEFT TOTAL HIP  BMD: 0.878 gm/sq. cm. T SCORE: -0.5 Z SCORE: 0.8     PA LUMBAR SPINE (L1 - L4)  BMD: 1.210 gm/sq. cm. T SCORE: 1.5 Z SCORE: 3.4        T scores are a comparison to sex-matched patients with mean peak bone mass and are given in standard deviation (s.d.).  Each 1 s.d. corresponds to approximately 10% below peak normal bone density.       WORLD HEALTH ORGANIZATION CRITERIA  NORMAL T SCORE: Above -1 s.d.    OSTEOPENIA T SCORE: Between -1 and -2.5 s.d.    OSTEOPOROSIS T  SCORE: -2.5 s.d.     National Osteoporosis Foundation Clinician's Guide to Prevention and Treatment of Osteoporosis recommendations for treatment:  Post menopausal women and men age 50 and older presenting with the following should be considered for treatment:  * A hip or vertebral (clinical or morphometric) fracture  * T score < -2.5 at the femoral neck or spine after appropriate evaluation to exclude secondary causes.  * Low bone mass (T score between -1.0 and -2.5 at the femoral neck or spine) and a 10-year probability of a hip fracture > 3% or a 10-year probability of a major osteoporosis-related fracture > 20% based on the [place]-adapted WHO algorithm              Impression  CONCLUSION:  1. Findings in the left femoral neck are in the normal range, and there is no increased fracture risk.  There has been increase in the BMD by 15.9% from previous study.  Findings in the total left hip are in the normal range, and there is no increased  fracture risk.  There has been increase in the BMD by 0.9% from previous study.  The 10 year fracture risk for major osteoporotic fracture is 7.6%, and for hip fracture is 0.4%.  2. Findings in the total AP lumbar spine are in the normal range, and there is no increased fracture risk.  There has been decrease in the BMD by 3.6% from previous study.           Dictated by (CST): Braulio Graham MD on 1/16/2024 at 5:13 PM      Finalized by (CST): Braulio Graham MD on 1/16/2024 at 5:14 PM

## 2024-05-16 PROBLEM — J44.89 ASTHMA WITH COPD (CHRONIC OBSTRUCTIVE PULMONARY DISEASE) (HCC): Chronic | Status: ACTIVE | Noted: 2024-05-16

## 2024-05-21 ENCOUNTER — OFFICE VISIT (OUTPATIENT)
Dept: FAMILY MEDICINE CLINIC | Facility: CLINIC | Age: 69
End: 2024-05-21

## 2024-05-21 ENCOUNTER — LAB ENCOUNTER (OUTPATIENT)
Dept: LAB | Age: 69
End: 2024-05-21
Attending: FAMILY MEDICINE

## 2024-05-21 VITALS
SYSTOLIC BLOOD PRESSURE: 114 MMHG | HEART RATE: 74 BPM | HEIGHT: 61.81 IN | OXYGEN SATURATION: 97 % | BODY MASS INDEX: 24.66 KG/M2 | TEMPERATURE: 99 F | DIASTOLIC BLOOD PRESSURE: 65 MMHG | WEIGHT: 134 LBS

## 2024-05-21 DIAGNOSIS — Z17.0 MALIGNANT NEOPLASM OF UPPER-INNER QUADRANT OF RIGHT BREAST IN FEMALE, ESTROGEN RECEPTOR POSITIVE (HCC): ICD-10-CM

## 2024-05-21 DIAGNOSIS — M85.89 OSTEOPENIA OF MULTIPLE SITES: ICD-10-CM

## 2024-05-21 DIAGNOSIS — Z86.010 HISTORY OF COLON POLYPS: ICD-10-CM

## 2024-05-21 DIAGNOSIS — Z00.00 ENCOUNTER FOR ANNUAL HEALTH EXAMINATION: Primary | ICD-10-CM

## 2024-05-21 DIAGNOSIS — J45.40 MODERATE PERSISTENT ASTHMA WITHOUT COMPLICATION (HCC): ICD-10-CM

## 2024-05-21 DIAGNOSIS — Z01.419 ENCOUNTER FOR GYNECOLOGICAL EXAMINATION WITHOUT ABNORMAL FINDING: ICD-10-CM

## 2024-05-21 DIAGNOSIS — C50.211 MALIGNANT NEOPLASM OF UPPER-INNER QUADRANT OF RIGHT BREAST IN FEMALE, ESTROGEN RECEPTOR POSITIVE (HCC): ICD-10-CM

## 2024-05-21 DIAGNOSIS — I10 ESSENTIAL HYPERTENSION: ICD-10-CM

## 2024-05-21 DIAGNOSIS — J30.89 NON-SEASONAL ALLERGIC RHINITIS, UNSPECIFIED TRIGGER: Chronic | ICD-10-CM

## 2024-05-21 DIAGNOSIS — F33.41 RECURRENT MAJOR DEPRESSIVE DISORDER, IN PARTIAL REMISSION (HCC): Chronic | ICD-10-CM

## 2024-05-21 LAB
ALBUMIN SERPL-MCNC: 4.4 G/DL (ref 3.2–4.8)
ALBUMIN/GLOB SERPL: 1.5 {RATIO} (ref 1–2)
ALP LIVER SERPL-CCNC: 83 U/L
ALT SERPL-CCNC: 21 U/L
ANION GAP SERPL CALC-SCNC: 7 MMOL/L (ref 0–18)
AST SERPL-CCNC: 24 U/L (ref ?–34)
BILIRUB SERPL-MCNC: 0.5 MG/DL (ref 0.2–1.1)
BUN BLD-MCNC: 20 MG/DL (ref 9–23)
BUN/CREAT SERPL: 24.7 (ref 10–20)
CALCIUM BLD-MCNC: 9.3 MG/DL (ref 8.7–10.4)
CHLORIDE SERPL-SCNC: 106 MMOL/L (ref 98–112)
CHOLEST SERPL-MCNC: 230 MG/DL (ref ?–200)
CO2 SERPL-SCNC: 29 MMOL/L (ref 21–32)
CREAT BLD-MCNC: 0.81 MG/DL
DEPRECATED RDW RBC AUTO: 44.5 FL (ref 35.1–46.3)
EGFRCR SERPLBLD CKD-EPI 2021: 80 ML/MIN/1.73M2 (ref 60–?)
ERYTHROCYTE [DISTWIDTH] IN BLOOD BY AUTOMATED COUNT: 13.7 % (ref 11–15)
FASTING PATIENT LIPID ANSWER: NO
FASTING STATUS PATIENT QL REPORTED: NO
GLOBULIN PLAS-MCNC: 2.9 G/DL (ref 2–3.5)
GLUCOSE BLD-MCNC: 99 MG/DL (ref 70–99)
HCT VFR BLD AUTO: 38.1 %
HDLC SERPL-MCNC: 55 MG/DL (ref 40–59)
HGB BLD-MCNC: 12.3 G/DL
LDLC SERPL CALC-MCNC: 121 MG/DL (ref ?–100)
MCH RBC QN AUTO: 28.7 PG (ref 26–34)
MCHC RBC AUTO-ENTMCNC: 32.3 G/DL (ref 31–37)
MCV RBC AUTO: 88.8 FL
NONHDLC SERPL-MCNC: 175 MG/DL (ref ?–130)
OSMOLALITY SERPL CALC.SUM OF ELEC: 297 MOSM/KG (ref 275–295)
PLATELET # BLD AUTO: 255 10(3)UL (ref 150–450)
POTASSIUM SERPL-SCNC: 3.9 MMOL/L (ref 3.5–5.1)
PROT SERPL-MCNC: 7.3 G/DL (ref 5.7–8.2)
RBC # BLD AUTO: 4.29 X10(6)UL
SODIUM SERPL-SCNC: 142 MMOL/L (ref 136–145)
TRIGL SERPL-MCNC: 309 MG/DL (ref 30–149)
VLDLC SERPL CALC-MCNC: 55 MG/DL (ref 0–30)
WBC # BLD AUTO: 7.1 X10(3) UL (ref 4–11)

## 2024-05-21 PROCEDURE — 85027 COMPLETE CBC AUTOMATED: CPT

## 2024-05-21 PROCEDURE — G0439 PPPS, SUBSEQ VISIT: HCPCS | Performed by: FAMILY MEDICINE

## 2024-05-21 PROCEDURE — 80053 COMPREHEN METABOLIC PANEL: CPT

## 2024-05-21 PROCEDURE — 80061 LIPID PANEL: CPT

## 2024-05-21 PROCEDURE — 96160 PT-FOCUSED HLTH RISK ASSMT: CPT | Performed by: FAMILY MEDICINE

## 2024-05-21 PROCEDURE — 36415 COLL VENOUS BLD VENIPUNCTURE: CPT

## 2024-05-21 RX ORDER — CHOLECALCIFEROL (VITAMIN D3) 50 MCG
2000 TABLET ORAL DAILY
COMMUNITY
Start: 2024-05-21

## 2024-05-21 RX ORDER — HYDROXYZINE HYDROCHLORIDE 25 MG/1
25 TABLET, FILM COATED ORAL DAILY
COMMUNITY
Start: 2024-03-12

## 2024-05-21 NOTE — PATIENT INSTRUCTIONS
Carrie Byrne's SCREENING SCHEDULE   Tests on this list are recommended by your physician but may not be covered, or covered at this frequency, by your insurer.   Please check with your insurance carrier before scheduling to verify coverage.   PREVENTATIVE SERVICES FREQUENCY &  COVERAGE DETAILS LAST COMPLETION DATE   Diabetes Screening    Fasting Blood Sugar /  Glucose    One screening every 12 months if never tested or if previously tested but not diagnosed with pre-diabetes   One screening every 6 months if diagnosed with pre-diabetes Lab Results   Component Value Date     (H) 06/13/2023        Cardiovascular Disease Screening    Lipid Panel  Cholesterol  Lipoprotein (HDL)  Triglycerides Covered every 5 years for all Medicare beneficiaries without apparent signs or symptoms of cardiovascular disease Lab Results   Component Value Date    CHOLEST 246 (H) 06/13/2023    HDL 79 06/13/2023     (H) 06/13/2023    TRIG 192 (H) 06/13/2023         Electrocardiogram (EKG)   Covered if needed at Welcome to Medicare, and non-screening if indicated for medical reasons 05/17/2022      Ultrasound Screening for Abdominal Aortic Aneurysm (AAA) Covered once in a lifetime for one of the following risk factors   • Men who are 65-75 years old and have ever smoked   • Anyone with a family history -     Colorectal Cancer Screening  Covered for ages 50-85; only need ONE of the following:    Colonoscopy   Covered every 10 years    Covered every 2 years if patient is at high risk or previous colonoscopy was abnormal 01/08/2020    Health Maintenance   Topic Date Due   • Colorectal Cancer Screening  01/08/2025       Flexible Sigmoidoscopy   Covered every 4 years -    Fecal Occult Blood Test Covered annually -   Bone Density Screening    Bone density screening    Covered every 2 years after age 65 if diagnosed with risk of osteoporosis or estrogen deficiency.    Covered yearly for long-term glucocorticoid medication use  (Steroids) Last Dexa Scan:    XR DEXA BONE DENSITOMETRY (CPT=77080) 01/16/2024      No recommendations at this time   Pap and Pelvic    Pap   Covered every 2 years for women at normal risk; Annually if at high risk -  Health Maintenance   Topic Date Due   • Pap Smear  01/28/2024       Chlamydia Annually if high risk -  No recommendations at this time   Screening Mammogram    Mammogram     Recommend annually for all female patients aged 40 and older    One baseline mammogram covered for patients aged 35-39 05/03/2023    Health Maintenance   Topic Date Due   • Mammogram  05/03/2024       Immunizations    Influenza Covered once per flu season  Please get every year -  No recommendations at this time    Pneumococcal Each vaccine ([id number] & [id number]) covered once after 65 Prevnar 13: -    [id number]: 05/17/2022     No recommendations at this time    Hepatitis B One screening covered for patients with certain risk factors   -  No recommendations at this time    Tetanus Toxoid Not covered by Medicare Part B unless medically necessary (cut with metal); may be covered with your pharmacy prescription benefits 01/04/2008    Tetanus, Diptheria and Pertusis TD and TDaP Not covered by Medicare Part B -  No recommendations at this time    Zoster Not covered by Medicare Part B; may be covered with your pharmacy  prescription benefits 01/05/2017  No recommendations at this time     Annual Monitoring of Persistent Medications (ACE/ARB, digoxin diuretics, anticonvulsants)    Potassium Annually Lab Results   Component Value Date    K 3.9 06/13/2023         Creatinine   Annually Lab Results   Component Value Date    CREATSERUM 0.84 06/13/2023         BUN Annually Lab Results   Component Value Date    BUN 18 06/13/2023       Drug Serum Conc Annually No results found for: \"DIGOXIN\", \"DIG\", \"VALP\"

## 2024-05-21 NOTE — PROGRESS NOTES
Subjective:   Carrie Byrne is a 68 year old female who presents for a MA (Medicare Advantage) Supervisit (Once per calendar year) and  issues as below .   Generally feeling well    History/Other:   Fall Risk Assessment:   She has been screened for Falls and is low risk.      Cognitive Assessment:   She had a completely normal cognitive assessment - see flowsheet entries       Functional Ability/Status:   Carrie Byrne has some abnormal functions as listed below:  She has Hearing problems based on screening of functional status.She has Vision problems based on screening of functional status. She has problems with Memory based on screening of functional status.       Depression Screening (PHQ-2/PHQ-9): PHQ-2 SCORE: 0  , done 5/21/2024   If you checked off any problems, how difficult have these problems made it for you to do your work, take care of things at home, or get along with other people?: Not difficult at all    Last [place] Suicide Screening on 5/21/2024 was No Risk.          Advanced Directives:   She does NOT have a Living Will. [Do you have a living will?: No]  She does NOT have a Power of  for Health Care. [Do you have a healthcare power of ?: No]  Discussed Advance Care Planning with patient (and family/surrogate if present). Standard forms made available to patient in After Visit Summary.      Patient Active Problem List   Diagnosis    Allergic rhinitis due to allergen    Essential hypertension    Malignant neoplasm of upper-inner quadrant of right breast in female, estrogen receptor positive (HCC)    History of colon polyps    Osteopenia of multiple sites    Major depression in partial remission (HCC)    Moderate persistent asthma without complication (HCC)     Allergies:  She is allergic to sulfa antibiotics, augmentin [amoxicillin-pot clavulanate], fish, and adhesive tape (rosins).    Current Medications:  Outpatient Medications Marked as Taking for the 5/21/24 encounter  (Office Visit) with Talita Lyon MD   Medication Sig    hydrocortisone 2.5 % External Ointment APPLY TO FACE TWICE A DAY FOR 7-10 DAYS    hydrOXYzine 25 MG Oral Tab Take 1 tablet (25 mg total) by mouth daily.    letrozole 2.5 MG Oral Tab Take 1 tablet (2.5 mg total) by mouth daily.    gabapentin 300 MG Oral Cap Take 1 capsule (300 mg total) by mouth nightly.    Sodium Chloride, Hypertonic, (MANOJ 128) 2 % Ophthalmic Solution     sertraline 100 MG Oral Tab Take 1 tablet (100 mg total) by mouth every morning.    montelukast 10 MG Oral Tab Take 1 tablet (10 mg total) by mouth nightly.    Losartan Potassium-HCTZ 100-12.5 MG Oral Tab Take 1 tablet by mouth daily.    albuterol (PROAIR HFA) 108 (90 Base) MCG/ACT Inhalation Aero Soln Inhale 2 puffs into the lungs every 4 (four) hours as needed for Wheezing.    FLOVENT  MCG/ACT Inhalation Aerosol INHALE 2 PUFFS BY MOUTH INTO THE LUNGS DAILY    MAGNESIUM OR Take by mouth daily.    Calcium Carbonate-Vitamin D (CALTRATE 600+D OR) Take by mouth daily.    Tacrolimus 0.03 % External Ointment BENI TO FACIAL RASH BID    Fexofenadine HCl (ALLEGRA) 60 MG Oral Tab Take  by mouth.       Medical History:  She  has a past medical history of Anxiety state, Ashkenazi Evangelical ancestry requiring population-specific genetic screening, Ashkenazi Evangelical ancestry requiring population-specific screening for genetic disorder (4/22/2019), Asthma (HCC), Atypical lobular hyperplasia of breast, Atypical lobular hyperplasia of breast (4/26/2012), Depression, Diverticulosis of colon (2008), DJD (degenerative joint disease) of hip (2010), DJD (degenerative joint disease), lumbar (2010), Eczema, Encounter for monitoring aromatase inhibitor therapy (1/21/2020), Esophageal reflux, Essential hypertension (2017), Exposure to medical diagnostic radiation, Extrinsic asthma, unspecified, First degree AV block (2013), Gastroesophageal reflux disease without esophagitis (10/26/2015), High blood pressure,  Lymphadenopathy, axillary (10/7/2020), Malignant neoplasm of upper-inner quadrant of right breast in female, estrogen receptor positive (HCC) (4/22/2019), Migraines, and Sinusitis.  Surgical History:  She  has a past surgical history that includes colonoscopy (2008); Bone Density Test (Scan) (2012); electrocardiogram, complete (4/12/2013); Breast biopsy (Right, 2012); skin surgery (2005); skin surgery (1/16/2015); colonoscopy (N/A, 1/8/2020); susan localization wire 1 site right (cpt=19281) (Right); lumpectomy right (Right, 05/06/2019); and radiation right (Right, 2019).   Family History:  Her family history includes Allergies in her daughter and sister; Breast Cancer (age of onset: 62) in her self; Cancer in her maternal aunt; Cancer (age of onset: 73) in her brother; Cancer (age of onset: 85) in her paternal uncle; Diabetes in her mother; Heart Disorder in her father; Hypertension in her father.  Social History:  She  reports that she has never smoked. She has never used smokeless tobacco. She reports current alcohol use. She reports that she does not use drugs.    Tobacco:  She has never smoked tobacco.    CAGE Alcohol Screen:   CAGE screening score of 0 on 5/21/2024, showing low risk of alcohol abuse.      Patient Care Team:  Talita Lyon MD as PCP - General (Family Medicine)  Slava Yao MD (Surgical Oncology)  Edilson Lal MD (OTOLARYNGOLOGY)  Jermaine Harrsi MD (Allergy and Immunology)  Burt Niño MD (SURGERY, GENERAL)  Gama Azar MD (Radiation Oncology)  Romero, Griselle, Melania Henry OT as Occupational Therapist (Occupational Therapist)    Review of Systems     Negative except see below    Objective:   Physical Exam  General Appearance:  Alert, cooperative, no distress, appears stated age   Head:  Normocephalic, without obvious abnormality, atraumatic   Eyes:  PERRL, conjunctiva/corneas clear, EOM's intact both eyes   Ears:  Normal TM's and external ear canals, both ears   Nose:  Nares normal, septum midline,mucosa normal, no drainage or sinus tenderness   Throat: Lips, mucosa, and tongue normal; teeth and gums normal   Neck: Supple, symmetrical, trachea midline, no adenopathy;  thyroid: not enlarged, symmetric, no tenderness/mass/nodules; no carotid bruit or JVD   Back:   Symmetric, no curvature, ROM normal, no CVA tenderness   Lungs:   Clear to auscultation bilaterally, respirations unlabored   Heart:  Regular rate and rhythm, S1 and S2 normal, no murmur, rub, or gallop   Abdomen:   Soft, non-tender, bowel sounds active all four quadrants,  no masses, no organomegaly   Pelvic: Deferred   Extremities: Extremities normal, atraumatic, no cyanosis or edema   Pulses: 2+ and symmetric   Skin: Skin color, texture, turgor normal, no rashes or lesions   Lymph nodes: Cervical, supraclavicular, and axillary nodes normal   Neurologic: Normal       /65   Pulse 74   Temp 98.5 °F (36.9 °C) (Oral)   Ht 5' 1.81\" (1.57 m)   Wt 134 lb (60.8 kg)   LMP  (LMP Unknown)   SpO2 97%   BMI 24.66 kg/m²  Estimated body mass index is 24.66 kg/m² as calculated from the following:    Height as of this encounter: 5' 1.81\" (1.57 m).    Weight as of this encounter: 134 lb (60.8 kg).    Medicare Hearing Assessment:   Hearing Screening    Time taken: 5/21/2024  1:27 PM  Entry User: [employer]  Screening Method: Finger Rub  Finger Rub Result: Pass         Visual Acuity:   Right Eye Visual Acuity: Uncorrected Right Eye Chart Acuity: 20/100   Left Eye Visual Acuity: Uncorrected Left Eye Chart Acuity: 20/30   Both Eyes Visual Acuity: Uncorrected Both Eyes Chart Acuity: 20/40   Able To Tolerate Visual Acuity: Yes        Assessment & Plan:   Carrie Byrne is a 68 year old female who presents for a Medicare Assessment.     1. Encounter for annual health examination (Primary)-patient is , adult children, 5-year-old grandson.  She is postmenopausal without spotting.  Continues to do very part-time work  for [employer].  Exercising with dog walking, strength training, pickleball, ballet, gardening.  Recent issues with corneal erosion.  Small uncomplicated splinter dorsal right DIP removed today.  Pap smear done today  Nonfasting labs done today    2. Malignant neoplasm of upper-inner quadrant of right breast in female, estrogen receptor positive (HCC)-followed by Dr. Navarrete, continuing with letrozole for 2 more months.  3. Recurrent major depressive disorder, in partial remission (HCC)-continues to see therapist, sertraline 100 mg daily.  Takes hydralazine every night for eczema.  Using lorazepam rarely.  4. Essential hypertension-blood pressure controlled with losartan/HCT.  -     Comp Metabolic Panel (14); Future; Expected date: 05/21/2024  -     Lipid Panel; Future; Expected date: 05/21/2024  -     CBC, Platelet; No Differential; Future; Expected date: 05/21/2024  5. Moderate persistent asthma without complication (HCC)-continuing Singulair daily.  Flovent and albuterol with symptoms.  6. History of colon polyps  Overview:  1/2020, repeat colonoscopy 5 years  7. Osteopenia of multiple sites-last BMD actually normal.  Continue weightbearing exercise, calcium in diet, vitamin D supplement.  Overview:  2024 BMD was normal no treatment. Repeat BMD in 3 yrs  8. Non-seasonal allergic rhinitis, unspecified trigger-continuing Allegra, Singulair.    The patient indicates understanding of these issues and agrees to the plan.  Lab work ordered.  Reinforced healthy diet, lifestyle, and exercise.      No follow-ups on file.     Talita Lyon MD, 5/21/2024     Supplementary Documentation:   General Health:  In the past six months, have you lost more than 10 pounds without trying?: 2 - No  Has your appetite been poor?: No  Type of Diet: Balanced  How does the patient maintain a good energy level?: Appropriate Exercise  How would you describe your daily physical activity?: Moderate  How would you describe your current health state?:  Good  How do you maintain positive mental well-being?: Social Interaction;Puzzles;Games;Visiting Friends;Visiting Family  On a scale of 0 to 10, with 0 being no pain and 10 being severe pain, what is your pain level?: 0 - (None)  In the past six months, have you experienced urine leakage?: 0-No  At any time do you feel concerned for the safety/well-being of yourself and/or your children, in your home or elsewhere?: No  Have you had any immunizations at another office such as Influenza, Hepatitis B, Tetanus, or Pneumococcal?: Kristin Byrne's SCREENING SCHEDULE   Tests on this list are recommended by your physician but may not be covered, or covered at this frequency, by your insurer.   Please check with your insurance carrier before scheduling to verify coverage.   PREVENTATIVE SERVICES FREQUENCY &  COVERAGE DETAILS LAST COMPLETION DATE   Diabetes Screening    Fasting Blood Sugar /  Glucose    One screening every 12 months if never tested or if previously tested but not diagnosed with pre-diabetes   One screening every 6 months if diagnosed with pre-diabetes Lab Results   Component Value Date     (H) 06/13/2023        Cardiovascular Disease Screening    Lipid Panel  Cholesterol  Lipoprotein (HDL)  Triglycerides Covered every 5 years for all Medicare beneficiaries without apparent signs or symptoms of cardiovascular disease Lab Results   Component Value Date    CHOLEST 246 (H) 06/13/2023    HDL 79 06/13/2023     (H) 06/13/2023    TRIG 192 (H) 06/13/2023         Electrocardiogram (EKG)   Covered if needed at Welcome to Medicare, and non-screening if indicated for medical reasons 05/17/2022      Ultrasound Screening for Abdominal Aortic Aneurysm (AAA) Covered once in a lifetime for one of the following risk factors    Men who are 65-75 years old and have ever smoked    Anyone with a family history -     Colorectal Cancer Screening  Covered for ages 50-85; only need ONE of the following:     Colonoscopy   Covered every 10 years    Covered every 2 years if patient is at high risk or previous colonoscopy was abnormal 01/08/2020    Health Maintenance   Topic Date Due    Colorectal Cancer Screening  01/08/2025       Flexible Sigmoidoscopy   Covered every 4 years -    Fecal Occult Blood Test Covered annually -   Bone Density Screening    Bone density screening    Covered every 2 years after age 65 if diagnosed with risk of osteoporosis or estrogen deficiency.    Covered yearly for long-term glucocorticoid medication use (Steroids) Last Dexa Scan:    XR DEXA BONE DENSITOMETRY (CPT=77080) 01/16/2024      No recommendations at this time   Pap and Pelvic    Pap   Covered every 2 years for women at normal risk; Annually if at high risk -  Health Maintenance   Topic Date Due    Pap Smear  01/28/2024       Chlamydia Annually if high risk -  No recommendations at this time   Screening Mammogram    Mammogram     Recommend annually for all female patients aged 40 and older    One baseline mammogram covered for patients aged 35-39 05/03/2023    Health Maintenance   Topic Date Due    Mammogram  05/03/2024       Immunizations    Influenza Covered once per flu season  Please get every year -  No recommendations at this time    Pneumococcal Each vaccine (Mzudahg21 & [id number]) covered once after 65 Prevnar 13: -    Fychxsrws00: 05/17/2022     No recommendations at this time    Hepatitis B One screening covered for patients with certain risk factors   -  No recommendations at this time    Tetanus Toxoid Not covered by Medicare Part B unless medically necessary (cut with metal); may be covered with your pharmacy prescription benefits 01/04/2008    Tetanus, Diptheria and Pertusis TD and TDaP Not covered by Medicare Part B -  No recommendations at this time    Zoster Not covered by Medicare Part B; may be covered with your pharmacy  prescription benefits 01/05/2017  No recommendations at this time     Annual Monitoring of  Persistent Medications (ACE/ARB, digoxin diuretics, anticonvulsants)    Potassium Annually Lab Results   Component Value Date    K 3.9 06/13/2023         Creatinine   Annually Lab Results   Component Value Date    CREATSERUM 0.84 06/13/2023         BUN Annually Lab Results   Component Value Date    BUN 18 06/13/2023       Drug Serum Conc Annually No results found for: \"DIGOXIN\", \"DIG\", \"VALP\"

## 2024-05-27 LAB — HPV I/H RISK 1 DNA SPEC QL NAA+PROBE: NEGATIVE

## 2024-05-28 RX ORDER — LOSARTAN POTASSIUM AND HYDROCHLOROTHIAZIDE 12.5; 1 MG/1; MG/1
1 TABLET ORAL DAILY
Qty: 90 TABLET | Refills: 3 | Status: SHIPPED | OUTPATIENT
Start: 2024-05-28

## 2024-05-28 NOTE — TELEPHONE ENCOUNTER
REFILL PASSED PER [facility] PROTOCOLS    Requested Prescriptions   Pending Prescriptions Disp Refills    LOSARTAN POTASSIUM-HCTZ 100-12.5 MG Oral Tab [Pharmacy Med Name: LOSARTAN-HCTZ 100-12.5 MG TAB] 90 tablet 3     Sig: TAKE 1 TABLET BY MOUTH EVERY DAY       Hypertension Medications Protocol Passed - 5/24/2024 12:26 AM        Passed - CMP or BMP in past 12 months        Passed - Last BP reading less than 140/90     BP Readings from Last 1 Encounters:   05/21/24 114/65               Passed - In person appointment or virtual visit in the past 12 mos or appointment in next 3 mos     Recent Outpatient Visits              1 week ago [facility]    2 weeks ago Malignant neoplasm of upper-inner quadrant of right breast in female, estrogen receptor positive (HCC)    [facility] Hematology Oncology Noel Murdock MD    Office Visit    6 months ago Malignant neoplasm of upper-inner quadrant of right breast in female, estrogen receptor positive (HCC)    [facility] Hematology Oncology Noel Murdock MD    Office Visit    11 months ago Encounter for annual physical exam    [facility] Talita Lyon MD    Office Visit    1 year ago Malignant neoplasm of upper-inner quadrant of right breast in female, estrogen receptor positive (HCC)    [facility] Hematology Oncology Noel Murdock MD    Office Visit          Future Appointments         Provider Department Appt Notes    In 4 weeks [address] [facility]     In 1 year Noel Murdock MD [facility] Hematology Oncology FOLLOW UP VISIT.CL  1 year                    Passed - EGFRCR or GFRNAA > 50     GFR Evaluation  EGFRCR: 80 , resulted on 5/21/2024               Future Appointments         Provider Department Appt Notes    In 4 weeks [facility]  [facility]     In 1 year Noel Murdock MD [facility] Hematology Oncology FOLLOW UP VISIT.CL  1 year          Recent Outpatient Visits              1 week ago [facility] for annual health examination    [facility]    2 weeks ago Malignant neoplasm of upper-inner quadrant of right breast in female, estrogen receptor positive (HCC)    [facility] Hematology Oncology Noel Murdock MD    Office Visit    6 months ago Malignant neoplasm of upper-inner quadrant of right breast in female, estrogen receptor positive (HCC)    [facility] Hematology Oncology Noel Murdock MD    Office Visit    11 months ago Encounter for annual physical exam    [facility]    Office Visit    1 year ago Malignant neoplasm of upper-inner quadrant of right breast in female, estrogen receptor positive (HCC)    [facility] Hematology Oncology Noel Murdock MD    Office Visit

## 2024-05-30 DIAGNOSIS — J30.89 NON-SEASONAL ALLERGIC RHINITIS, UNSPECIFIED TRIGGER: Chronic | ICD-10-CM

## 2024-06-03 RX ORDER — MONTELUKAST SODIUM 10 MG/1
10 TABLET ORAL NIGHTLY
Qty: 90 TABLET | Refills: 3 | Status: SHIPPED | OUTPATIENT
Start: 2024-06-03

## 2024-06-03 RX ORDER — MONTELUKAST SODIUM 10 MG/1
10 TABLET ORAL NIGHTLY
Qty: 90 TABLET | Refills: 3 | OUTPATIENT
Start: 2024-06-03

## 2024-06-03 NOTE — TELEPHONE ENCOUNTER
Please review. Protocol Failed; No Protocol    Requested Prescriptions   Pending Prescriptions Disp Refills    montelukast 10 MG Oral Tab 90 tablet 3     Sig: Take 1 tablet (10 mg total) by mouth nightly.       Asthma & COPD Medication Protocol Failed - 5/30/2024  7:15 PM        Failed - Asthma Action Score greater than or equal to 20        Failed - AAP/ACT given in last 12 months     No data recorded  No data recorded  No data recorded  No data recorded          Passed - Appointment in past 6 or next 3 months      Recent Outpatient Visits              1 week ago [facility] for annual health examination    [facility]    3 weeks ago Malignant neoplasm of upper-inner quadrant of right breast in female, estrogen receptor positive (HCC)    [facility] Hematology Oncology Noel Murdock MD    Office Visit    7 months ago Malignant neoplasm of upper-inner quadrant of right breast in female, estrogen receptor positive (HCC)    [facility] Hematology Oncology Noel Murdock MD    Office Visit    11 months ago Encounter for annual physical exam    [facility]    Office Visit    1 year ago Malignant neoplasm of upper-inner quadrant of right breast in female, estrogen receptor positive (HCC)    [facility] Hematology Oncology Noel Murdock MD    Office Visit          Future Appointments         Provider Department Appt Notes    In 3 weeks [address]     In 1 year Noel Murdock MD [facility] Hematology Oncology FOLLOW UP VISIT.CL  1 year                           Future Appointments         Provider Department Appt Notes    In 3 weeks [address]     In 1 year Noel Murdock MD [facility] Hematology Oncology FOLLOW UP VISIT.CL  1 year          Recent  Outpatient Visits              1 week ago Encounter for annual health examination    [facility] Talita Lyon MD    Office Visit    3 weeks ago Malignant neoplasm of upper-inner quadrant of right breast in female, estrogen receptor positive (HCC)    [facility] Hematology Oncology Noel Murdock MD    Office Visit    7 months ago Malignant neoplasm of upper-inner quadrant of right breast in female, estrogen receptor positive (HCC)    [facility] Hematology Oncology Noel Murdock MD    Office Visit    11 months ago Encounter for annual physical exam    [facility] Talita Lyon MD    Office Visit    1 year ago Malignant neoplasm of upper-inner quadrant of right breast in female, estrogen receptor positive (HCC)    [facility] Hematology Oncology Noel Murdock MD    Office Visit

## 2024-06-26 ENCOUNTER — HOSPITAL ENCOUNTER (OUTPATIENT)
Dept: MAMMOGRAPHY | Age: 69
Discharge: HOME | End: 2024-06-26
Attending: INTERNAL MEDICINE

## 2024-06-26 DIAGNOSIS — Z12.31 SCREENING MAMMOGRAM, ENCOUNTER FOR: ICD-10-CM

## 2024-06-26 PROCEDURE — 77063 BREAST TOMOSYNTHESIS BI: CPT | Performed by: INTERNAL MEDICINE

## 2024-06-26 PROCEDURE — 77067 SCR MAMMO BI INCL CAD: CPT | Performed by: INTERNAL MEDICINE

## 2024-08-08 RX ORDER — SERTRALINE HYDROCHLORIDE 100 MG/1
100 TABLET, FILM COATED ORAL EVERY MORNING
Qty: 90 TABLET | Refills: 3 | Status: SHIPPED | OUTPATIENT
Start: 2024-08-08

## 2024-08-08 NOTE — TELEPHONE ENCOUNTER
Patient would like a refill on Rx sertraline 100MG for 90 tablets. Please advise       Verified pharmacy [facility] mail order           Current Outpatient Medications   Medication Sig Dispense Refill    sertraline 100 MG Oral Tab Take 1 tablet (100 mg total) by mouth every morning. 90 tablet 3

## 2024-08-08 NOTE — TELEPHONE ENCOUNTER
Refill passed per [facility] protocol.  Requested Prescriptions   Pending Prescriptions Disp Refills    sertraline 100 MG Oral Tab 90 tablet 3     Sig: Take 1 tablet (100 mg total) by mouth every morning.       Psychiatric Non-Scheduled (Anti-Anxiety) Passed - 8/8/2024  4:14 PM        Passed - In person appointment or virtual visit in the past 6 mos or appointment in next 3 mos     Recent Outpatient Visits              2 months ago [facility] for annual health examination    [facility]    2 months ago Malignant neoplasm of upper-inner quadrant of right breast in female, estrogen receptor positive (HCC)    [facility] Hematology Oncology Noel Murdock MD    Office Visit    9 months ago Malignant neoplasm of upper-inner quadrant of right breast in female, estrogen receptor positive (HCC)    [facility] Hematology Oncology Noel Murdock MD    Office Visit    1 year ago Encounter for annual physical exam    [facility]    Office Visit    1 year ago Malignant neoplasm of upper-inner quadrant of right breast in female, estrogen receptor positive (HCC)    [facility] Hematology Oncology Noel Murdock MD    Office Visit          Future Appointments         Provider Department Appt Notes    In 10 months Noel Murdock MD [facility] Hematology Oncology FOLLOW UP VISIT.CL  1 year                    Passed - Depression Screening completed within the past 12 months           Recent Outpatient Visits              2 months ago Encounter for annual health examination    [facility]    Office Visit    2 months ago Malignant neoplasm of upper-inner quadrant of right breast in female, estrogen receptor positive (HCC)    [facility] Hematology Oncology Noel Murdock MD    Office Visit    9 months  ago Malignant neoplasm of upper-inner quadrant of right breast in female, estrogen receptor positive (HCC)    [facility] Hematology Oncology Noel Murdock MD    Office Visit    1 year ago Encounter for annual physical exam    [facility] Talita PADGETT MD    Office Visit    1 year ago Malignant neoplasm of upper-inner quadrant of right breast in female, estrogen receptor positive (HCC)    [facility] Hematology Oncology Noel Murdock MD    Office Visit          Future Appointments         Provider Department Appt Notes    In 10 months Noel Murdock MD [facility] Hematology Oncology FOLLOW UP VISIT.CL  1 year

## 2024-08-16 DIAGNOSIS — C50.211 MALIGNANT NEOPLASM OF UPPER-INNER QUADRANT OF RIGHT BREAST IN FEMALE, ESTROGEN RECEPTOR POSITIVE (HCC): ICD-10-CM

## 2024-08-16 DIAGNOSIS — Z17.0 MALIGNANT NEOPLASM OF UPPER-INNER QUADRANT OF RIGHT BREAST IN FEMALE, ESTROGEN RECEPTOR POSITIVE (HCC): ICD-10-CM

## 2024-08-16 RX ORDER — LETROZOLE 2.5 MG/1
2.5 TABLET, FILM COATED ORAL DAILY
Qty: 90 TABLET | Refills: 0 | OUTPATIENT
Start: 2024-08-16

## 2024-12-12 ENCOUNTER — TELEPHONE (OUTPATIENT)
Dept: GASTROENTEROLOGY | Facility: CLINIC | Age: 69
End: 2024-12-12

## 2024-12-12 NOTE — TELEPHONE ENCOUNTER
Patient outreach message received:      Colon recall for 5 years placed in pt outreach     Recall reminder letter sent out to patient via [employer].

## 2025-04-07 RX ORDER — GABAPENTIN 300 MG/1
300 CAPSULE ORAL NIGHTLY
Qty: 90 CAPSULE | Refills: 3 | Status: SHIPPED | OUTPATIENT
Start: 2025-04-07 | End: 2025-07-03 | Stop reason: ALTCHOICE

## 2025-05-16 ENCOUNTER — TELEPHONE (OUTPATIENT)
Dept: FAMILY MEDICINE CLINIC | Facility: CLINIC | Age: 70
End: 2025-05-16

## 2025-05-16 DIAGNOSIS — J30.89 NON-SEASONAL ALLERGIC RHINITIS, UNSPECIFIED TRIGGER: Chronic | ICD-10-CM

## 2025-05-16 RX ORDER — MONTELUKAST SODIUM 10 MG/1
10 TABLET ORAL NIGHTLY
Qty: 90 TABLET | Refills: 0 | Status: SHIPPED | OUTPATIENT
Start: 2025-05-16

## 2025-05-16 NOTE — TELEPHONE ENCOUNTER
Please review.  Protocol failed/has no protocol.    Last Office Visit: 05/21/2024  [employer] message sent to patient to schedule an office visit with Primary Care Provider. Will also route to Call Center to make a phone attempt.  Requested Prescriptions     Pending Prescriptions Disp Refills    MONTELUKAST 10 MG Oral Tab [Pharmacy Med Name: MONTELUKAST  TAB 10MG] 90 tablet 3     Sig: TAKE 1 TABLET NIGHTLY     Please see message below for upcoming appointment.    Future Appointments   Date Time Provider Department Center   7/3/2025 10:30 AM [facility]

## 2025-07-03 ENCOUNTER — OFFICE VISIT (OUTPATIENT)
Age: 70
End: 2025-07-03
Attending: INTERNAL MEDICINE
Payer: MEDICARE

## 2025-07-03 VITALS
TEMPERATURE: 99 F | WEIGHT: 135 LBS | RESPIRATION RATE: 16 BRPM | BODY MASS INDEX: 24.84 KG/M2 | HEIGHT: 62 IN | OXYGEN SATURATION: 96 % | HEART RATE: 72 BPM | DIASTOLIC BLOOD PRESSURE: 76 MMHG | SYSTOLIC BLOOD PRESSURE: 120 MMHG

## 2025-07-03 DIAGNOSIS — Z08 ENCOUNTER FOR FOLLOW-UP SURVEILLANCE OF BREAST CANCER: ICD-10-CM

## 2025-07-03 DIAGNOSIS — Z12.31 SCREENING MAMMOGRAM, ENCOUNTER FOR: ICD-10-CM

## 2025-07-03 DIAGNOSIS — Z85.3 HISTORY OF RIGHT BREAST CANCER: Primary | ICD-10-CM

## 2025-07-03 DIAGNOSIS — Z85.3 ENCOUNTER FOR FOLLOW-UP SURVEILLANCE OF BREAST CANCER: ICD-10-CM

## 2025-07-03 NOTE — PROGRESS NOTES
HPI     Carrie Byrne is a 69 year old female who is here today for follow-up of diagnosis of   Encounter Diagnoses   Name Primary?    History of right breast cancer Yes    Encounter for follow-up surveillance of breast cancer     Screening mammogram, encounter for       No changes on SBE.        Good ROM of the RUE and no edema.      Keeping active.      She is taking calcium on her diet and vitamin D.     ECOG PS 0      Review of Systems:     Review of Systems   Constitutional:  Negative for appetite change, fatigue and unexpected weight change.   Respiratory:  Negative for cough and shortness of breath.    Cardiovascular:  Negative for chest pain.   Gastrointestinal:  Negative for abdominal pain.   Endocrine: Positive for hot flashes (still has these but improved).   Musculoskeletal:  Negative for arthralgias, back pain and neck pain.        States tension on the upper back   Skin:  Positive for itching (from eczema).   Neurological:  Negative for dizziness and headaches.   Hematological:  Negative for adenopathy.   Psychiatric/Behavioral:  Positive for sleep disturbance (mostly ok).         States at times does not feel like doing anything.         Current Outpatient Medications   Medication Sig Dispense Refill    MONTELUKAST 10 MG Oral Tab TAKE 1 TABLET NIGHTLY 90 tablet 0    sertraline 100 MG Oral Tab Take 1 tablet (100 mg total) by mouth every morning. 90 tablet 3    Losartan Potassium-HCTZ 100-12.5 MG Oral Tab Take 1 tablet by mouth daily. 90 tablet 3    hydrocortisone 2.5 % External Ointment       hydrOXYzine 25 MG Oral Tab Take 1 tablet (25 mg total) by mouth in the morning.      Cholecalciferol (VITAMIN D) 50 MCG (2000 UT) Oral Tab Take 2,000 Int'l Units by mouth in the morning.      Sodium Chloride, Hypertonic, (MANOJ 128) 2 % Ophthalmic Solution       albuterol (PROAIR HFA) 108 (90 Base) MCG/ACT Inhalation Aero Soln Inhale 2 puffs into the lungs every 4 (four) hours as needed for Wheezing. 3 each 3     LORazepam 0.5 MG Oral Tab Take 1 tablet (0.5 mg total) by mouth every 6 (six) hours as needed for Anxiety. 20 tablet 0    FLOVENT  MCG/ACT Inhalation Aerosol INHALE 2 PUFFS BY MOUTH INTO THE LUNGS DAILY 36 Inhaler 3    MAGNESIUM OR Take by mouth in the morning.      Tacrolimus 0.03 % External Ointment   0    Fexofenadine HCl (ALLEGRA) 60 MG Oral Tab Take by mouth.       Allergies:   Allergies   Allergen Reactions    Sulfa Antibiotics ANAPHYLAXIS     Other reaction(s): anaphlaxsis    Augmentin [Amoxicillin-Pot Clavulanate] NAUSEA AND VOMITING    Fish OTHER (SEE COMMENTS)     Other reaction(s): Facial Swelling    Adhesive Tape (Rosins) RASH     From Tegaderm dressing after breast surgery       Past Medical History:    Anxiety state    Ashkenazi Congregation ancestry requiring population-specific genetic screening    Ashkenazi Congregation ancestry requiring population-specific screening for genetic disorder    Asthma (HCC)    Atypical lobular hyperplasia of breast    Atypical lobular hyperplasia of breast    Depression    Diverticulosis of colon    DJD (degenerative joint disease) of hip    DJD hips and lumbar. Physical therapy    DJD (degenerative joint disease), lumbar    DJD hips and lumbar. Physical therapy    Eczema    Encounter for monitoring aromatase inhibitor therapy    Esophageal reflux    Essential hypertension    controlled with Rx    Exposure to medical diagnostic radiation    Extrinsic asthma, unspecified    First degree AV block    Gastroesophageal reflux disease without esophagitis    High blood pressure    Lymphadenopathy, axillary    Malignant neoplasm of upper-inner quadrant of right breast in female, estrogen receptor positive (HCC)    Migraines    Sinusitis     Past Surgical History:   Procedure Laterality Date    Bone density test scan  2012    5/10/2012 normal bone mineral density    Breast biopsy Right 2012    [place]    Colonoscopy  2008    asymptomatic diverticulosis on colonoscopy    Colonoscopy  N/A 1/8/2020    Procedure: COLONOSCOPY;  Surgeon: Kwame Wagner MD;  Location: [facility]    Electrocardiogram, complete  4/12/2013    scanned to media tab    Lumpectomy right Right 05/06/2019    Zane localization wire 1 site right (cpt=19281) Right     Radiation right Right 2019    Skin surgery  2005    scalp cyst ex x 3    Skin surgery  1/16/2015    excision scalp cysts x2     Social History     Socioeconomic History    Marital status:     Number of children: 2   Occupational History    Occupation:    Tobacco Use    Smoking status: Never    Smokeless tobacco: Never   Vaping Use    Vaping status: Never Used   Substance and Sexual Activity    Alcohol use: Yes     Comment: 4 drinks weekly    Drug use: Never   Other Topics Concern    Caffeine Concern Yes     Comment: coffee, 1 cup       Family History   Problem Relation Age of Onset    Hypertension Father     Heart Disorder Father     Diabetes Mother     Allergies Daughter     Allergies Sister     Cancer Brother 73        slow progressing prostate    Cancer Maternal Aunt         unknown primary    Cancer Paternal Uncle 85        unknown type    Breast Cancer Self 63    Ovarian Cancer Neg          PHYSICAL EXAM:    /76 (BP Location: Left arm, Patient Position: Sitting, Cuff Size: adult)   Pulse 72   Temp 98.6 °F (37 °C) (Oral)   Resp 16   Ht 1.575 m (5' 2\")   Wt 61.2 kg (135 lb)   LMP  (LMP Unknown)   SpO2 96%   BMI 24.69 kg/m²   Wt Readings from Last 6 Encounters:   07/03/25 61.2 kg (135 lb)   05/21/24 60.8 kg (134 lb)   05/13/24 60.8 kg (134 lb)   11/02/23 60.8 kg (134 lb)   06/13/23 61.3 kg (135 lb 2 oz)   05/02/23 62.1 kg (136 lb 12.8 oz)     General: Patient is alert and oriented x 3, not in acute distress.  HEENT: EOMs intact. PERRL.   Neck: No JVD. No palpable lymphadenopathy. Neck is supple.  Chest: Clear to auscultation.  Breasts: R breast with surgical scar, axillary scar palpable, no masses.  L breast no masses.   Heart: Regular  rate and rhythm.   Abdomen: Soft, non tender with good bowel sounds.  Extremities: No edema.  Neurological: Grossly intact.   Lymphatics: There is no palpable lymphadenopathy throughout in the cervical, supraclavicular, axillary or inguinal regions.  Psych/Depression: normal        ASSESSMENT/PLAN:     Encounter Diagnoses   Name Primary?    History of right breast cancer Yes    Encounter for follow-up surveillance of breast cancer     Screening mammogram, encounter for         Cancer Staging  Malignant neoplasm of upper-inner quadrant of right breast in female, estrogen receptor positive (HCC)  Staging form: Breast, AJCC 8th Edition  - Pathologic stage from 5/14/2019: Stage IA (pT1a, pN0(sn), cM0, G1, ER+, OK+, HER2-) - Signed by Noel Murdock MD on 5/14/2019      Patient has completed breast conservation with a lumpectomy on 5/6/2019 and completed adjuvant radiation therapy on 6/28/2019.    Completed adjuvant AI x 5 yrs until July of 2024.    AI hot flashes:  No longer on gabapentin as worried about risk of dementia.  Reviewed literature with the patient, discussed that this is safe to use.  She will take at bedtime for hot flashes and sleep.    Mammogram with last screening on 6/26/24,  BI-RADS 2, this will be due in 12 months.  Order given to have at her earliest convenience    Baseline DEXA prior to start of AI was performed on 7/11/2019.  Last DEXA 1/16/24 normal.  She is encouraged to continue with supplementation of vitamin D and calcium.  Recommend weightbearing exercise.  DEXA per PCP.       Return in about 1 year (around 7/3/2026) for surveillance follow up.    MDM: low      Quality measures:    Hypertension target range 130-140/70-80    Tobacco:  She has never smoked tobacco.          No orders of the defined types were placed in this encounter.      Results From Past 48 Hours:  No results found for this or any previous visit (from the past 48 hours).      Imaging & Referrals:  [facility] AGATA 2D+3D SCREENING  BILAT (CPT=77067/32570)   No orders of the defined types were placed in this encounter.    PROCEDURE: [facility] AGATA 2D+3D SCREENING BILAT (20762/03905)      COMPARISON: [facility], [facility] AGATA 2D+3D DIAGNOSTIC BOLA RIGHT (CPT=77065/90737), 12/11/2019, 10:32 AM.  [facility], [facility] AGATA 2D+3D DIAGNOSTIC BOLA BILAT (JTK=24675/14869), 7/27/2020, 2:25 PM.  [facility] [place], [facility] AGATA 2D+3D SCREENING BILAT (93238/22058), 12/27/2021, 4:29 PM.  [facility] [place], [facility] AGATA 2D+3D SCREENING BILAT ([id number]), 5/03/2023, 3:02 PM.      INDICATIONS: Z12.31 Screening mammogram, encounter for      TECHNIQUE: Full field direct screening mammography was performed and images were reviewed with the Ntirety ANA 1.5.1.5 CAD device.  3D tomosynthesis was performed and reviewed         BREAST COMPOSITION:   Category c-Heterogeneously dense, which may obscure small masses.         FINDINGS: Stable benign postsurgical changes in the right breast.  The parenchyma pattern is stable with no new suspicious asymmetry, mass, architectural distortion, or microcalcifications identified in either breast.                  Impression  CONCLUSION:   Benign findings.  No mammographic evidence for breast malignancy.  As long as the patient's clinical breast exam remains unchanged, annual screening mammogram is recommended.      BI-RADS CATEGORY:     DIAGNOSTIC CATEGORY 2--BENIGN FINDING:       RECOMMENDATIONS:   ROUTINE MAMMOGRAM AND CLINICAL EVALUATION IN 12 MONTHS.                   PLEASE NOTE: NORMAL MAMMOGRAM DOES NOT EXCLUDE THE POSSIBILITY OF BREAST CANCER.  A CLINICALLY SUSPICIOUS PALPABLE LUMP SHOULD BE BIOPSIED.       For patients over the age of 40, the target due date for the patient's next mammogram has been entered into a reminder system.       Patient received a discharge summary from the technologist after completion of exam.      Breast marker legend used on  images    Triangle = Palpable lump   [place] = Skin tag or mole   BB = Nipple   Linear wild = Scar   Square = Pain            Dictated by (CST): Cole Salgado MD on 6/28/2024 at 3:32 PM       Finalized by (CST): Cole Salgado MD on 6/28/2024 at 3:33 PM

## 2025-07-15 RX ORDER — LOSARTAN POTASSIUM AND HYDROCHLOROTHIAZIDE 12.5; 1 MG/1; MG/1
1 TABLET ORAL DAILY
Qty: 90 TABLET | Refills: 3 | Status: SHIPPED | OUTPATIENT
Start: 2025-07-15

## 2025-07-15 NOTE — TELEPHONE ENCOUNTER
Please review; protocol failed/ has no protocol      Please see message below for upcoming appointment.    Future Appointments   Date Time Provider Department Center   7/18/2025  9:30 AM Talita Lyon MD [facility]

## 2025-07-18 ENCOUNTER — OFFICE VISIT (OUTPATIENT)
Dept: FAMILY MEDICINE CLINIC | Facility: CLINIC | Age: 70
End: 2025-07-18
Payer: MEDICARE

## 2025-07-18 VITALS
RESPIRATION RATE: 16 BRPM | BODY MASS INDEX: 25.06 KG/M2 | TEMPERATURE: 97 F | OXYGEN SATURATION: 98 % | DIASTOLIC BLOOD PRESSURE: 78 MMHG | WEIGHT: 136.19 LBS | HEART RATE: 67 BPM | HEIGHT: 62 IN | SYSTOLIC BLOOD PRESSURE: 127 MMHG

## 2025-07-18 DIAGNOSIS — Z85.3 HISTORY OF BREAST CANCER: ICD-10-CM

## 2025-07-18 DIAGNOSIS — J45.40 MODERATE PERSISTENT ASTHMA WITHOUT COMPLICATION (HCC): ICD-10-CM

## 2025-07-18 DIAGNOSIS — Z86.0100 HISTORY OF COLON POLYPS: ICD-10-CM

## 2025-07-18 DIAGNOSIS — Z00.00 ENCOUNTER FOR ANNUAL HEALTH EXAMINATION: Primary | ICD-10-CM

## 2025-07-18 DIAGNOSIS — F33.41 RECURRENT MAJOR DEPRESSIVE DISORDER, IN PARTIAL REMISSION: Chronic | ICD-10-CM

## 2025-07-18 DIAGNOSIS — I10 ESSENTIAL HYPERTENSION: ICD-10-CM

## 2025-07-18 DIAGNOSIS — J30.89 NON-SEASONAL ALLERGIC RHINITIS, UNSPECIFIED TRIGGER: Chronic | ICD-10-CM

## 2025-07-18 DIAGNOSIS — M85.89 OSTEOPENIA OF MULTIPLE SITES: ICD-10-CM

## 2025-07-18 PROBLEM — Z17.0 MALIGNANT NEOPLASM OF UPPER-INNER QUADRANT OF RIGHT BREAST IN FEMALE, ESTROGEN RECEPTOR POSITIVE (HCC): Status: RESOLVED | Noted: 2019-04-22 | Resolved: 2025-07-18

## 2025-07-18 PROBLEM — C50.211 MALIGNANT NEOPLASM OF UPPER-INNER QUADRANT OF RIGHT BREAST IN FEMALE, ESTROGEN RECEPTOR POSITIVE (HCC): Status: RESOLVED | Noted: 2019-04-22 | Resolved: 2025-07-18

## 2025-07-18 LAB
ALBUMIN SERPL-MCNC: 4.7 G/DL (ref 3.2–4.8)
ALBUMIN/GLOB SERPL: 1.8 {RATIO} (ref 1–2)
ALP LIVER SERPL-CCNC: 92 U/L (ref 55–142)
ALT SERPL-CCNC: 33 U/L (ref 10–49)
ANION GAP SERPL CALC-SCNC: 5 MMOL/L (ref 0–18)
AST SERPL-CCNC: 33 U/L (ref ?–34)
BILIRUB SERPL-MCNC: 0.7 MG/DL (ref 0.2–1.1)
BUN BLD-MCNC: 22 MG/DL (ref 9–23)
BUN/CREAT SERPL: 24.7 (ref 10–20)
CALCIUM BLD-MCNC: 9.4 MG/DL (ref 8.7–10.4)
CHLORIDE SERPL-SCNC: 104 MMOL/L (ref 98–112)
CHOLEST SERPL-MCNC: 214 MG/DL (ref ?–200)
CO2 SERPL-SCNC: 29 MMOL/L (ref 21–32)
CREAT BLD-MCNC: 0.89 MG/DL (ref 0.55–1.02)
DEPRECATED RDW RBC AUTO: 42.5 FL (ref 35.1–46.3)
EGFRCR SERPLBLD CKD-EPI 2021: 71 ML/MIN/1.73M2 (ref 60–?)
ERYTHROCYTE [DISTWIDTH] IN BLOOD BY AUTOMATED COUNT: 13.2 % (ref 11–15)
FASTING PATIENT LIPID ANSWER: YES
FASTING STATUS PATIENT QL REPORTED: YES
GLOBULIN PLAS-MCNC: 2.6 G/DL (ref 2–3.5)
GLUCOSE BLD-MCNC: 83 MG/DL (ref 70–99)
HCT VFR BLD AUTO: 39.1 % (ref 35–48)
HDLC SERPL-MCNC: 68 MG/DL (ref 40–59)
HGB BLD-MCNC: 12.9 G/DL (ref 12–16)
LDLC SERPL CALC-MCNC: 118 MG/DL (ref ?–100)
MCH RBC QN AUTO: 29.1 PG (ref 26–34)
MCHC RBC AUTO-ENTMCNC: 33 G/DL (ref 31–37)
MCV RBC AUTO: 88.1 FL (ref 80–100)
NONHDLC SERPL-MCNC: 146 MG/DL (ref ?–130)
OSMOLALITY SERPL CALC.SUM OF ELEC: 288 MOSM/KG (ref 275–295)
PLATELET # BLD AUTO: 245 10(3)UL (ref 150–450)
POTASSIUM SERPL-SCNC: 4.1 MMOL/L (ref 3.5–5.1)
PROT SERPL-MCNC: 7.3 G/DL (ref 5.7–8.2)
RBC # BLD AUTO: 4.44 X10(6)UL (ref 3.8–5.3)
SODIUM SERPL-SCNC: 138 MMOL/L (ref 136–145)
TRIGL SERPL-MCNC: 161 MG/DL (ref 30–149)
VLDLC SERPL CALC-MCNC: 28 MG/DL (ref 0–30)
WBC # BLD AUTO: 7.1 X10(3) UL (ref 4–11)

## 2025-07-18 PROCEDURE — 85027 COMPLETE CBC AUTOMATED: CPT | Performed by: FAMILY MEDICINE

## 2025-07-18 PROCEDURE — 80061 LIPID PANEL: CPT | Performed by: FAMILY MEDICINE

## 2025-07-18 PROCEDURE — 80053 COMPREHEN METABOLIC PANEL: CPT | Performed by: FAMILY MEDICINE

## 2025-07-18 NOTE — PROGRESS NOTES
Subjective:   Carrie Byrne is a 69 year old female who presents for a MA AHA (Medicare Advantage Annual Health Assessment) .   History of Present Illness      Generally feeling well  History/Other:   Fall Risk Assessment:   She has been screened for Falls and is low risk.      Cognitive Assessment:   She had a completely normal cognitive assessment - see flowsheet entries     Functional Ability/Status:   Carrie Byrne has some abnormal functions as listed below:  She has Vision problems based on screening of functional status.       Depression Screening (PHQ):  PHQ-2 SCORE: 0  , done 7/21/2025   Last [place] Suicide Screening on 7/18/2025 was No Risk.          Advanced Directives:   She does NOT have a Living Will. [Do you have a living will?: (Patient-Rptd) No]  She does NOT have a Power of  for Health Care. [Do you have a healthcare power of ?: (Patient-Rptd) No]  Discussed Advance Care Planning with patient (and family/surrogate if present). Standard forms made available to patient in After Visit Summary.      Patient Active Problem List   Diagnosis    Allergic rhinitis due to allergen    Essential hypertension    History of colon polyps    Osteopenia of multiple sites    Major depression in partial remission    Moderate persistent asthma without complication (HCC)    History of breast cancer     Allergies:  She is allergic to sulfa antibiotics, augmentin [amoxicillin-pot clavulanate], fish, and adhesive tape (rosins).    Current Medications:  Outpatient Medications Marked as Taking for the 7/18/25 encounter (Office Visit) with Talita Lyon MD   Medication Sig    Losartan Potassium-HCTZ 100-12.5 MG Oral Tab Take 1 tablet by mouth daily.    sertraline 100 MG Oral Tab Take 1 tablet (100 mg total) by mouth every morning.    MONTELUKAST 10 MG Oral Tab TAKE 1 TABLET NIGHTLY    hydrocortisone 2.5 % External Ointment     hydrOXYzine 25 MG Oral Tab Take 1 tablet (25 mg total) by mouth in the  morning.    Cholecalciferol (VITAMIN D) 50 MCG (2000 UT) Oral Tab Take 2,000 Int'l Units by mouth in the morning.    Sodium Chloride, Hypertonic, (MANOJ 128) 2 % Ophthalmic Solution     albuterol (PROAIR HFA) 108 (90 Base) MCG/ACT Inhalation Aero Soln Inhale 2 puffs into the lungs every 4 (four) hours as needed for Wheezing.    FLOVENT  MCG/ACT Inhalation Aerosol INHALE 2 PUFFS BY MOUTH INTO THE LUNGS DAILY    MAGNESIUM OR Take by mouth in the morning.    Tacrolimus 0.03 % External Ointment     Fexofenadine HCl (ALLEGRA) 60 MG Oral Tab Take by mouth.       Medical History:  She  has a past medical history of Anxiety state, Ashkenazi [place] ancestry requiring population-specific genetic screening, Ashkenazi [place] ancestry requiring population-specific screening for genetic disorder (4/22/2019), Asthma (HCC), Atypical lobular hyperplasia of breast, Atypical lobular hyperplasia of breast (4/26/2012), Depression, Diverticulosis of colon (2008), DJD (degenerative joint disease) of hip (2010), DJD (degenerative joint disease), lumbar (2010), Eczema, Encounter for monitoring aromatase inhibitor therapy (1/21/2020), Esophageal reflux, Essential hypertension (2017), Exposure to medical diagnostic radiation, Extrinsic asthma, unspecified, First degree AV block (2013), Gastroesophageal reflux disease without esophagitis (10/26/2015), High blood pressure, Lymphadenopathy, axillary (10/7/2020), Malignant neoplasm of upper-inner quadrant of right breast in female, estrogen receptor positive (HCC) (4/22/2019), Migraines, and Sinusitis.  Surgical History:  She  has a past surgical history that includes colonoscopy (2008); Bone Density Test (Scan) (2012); electrocardiogram, complete (4/12/2013); Breast biopsy (Right, 2012); skin surgery (2005); skin surgery (1/16/2015); colonoscopy (N/A, 1/8/2020); susan localization wire 1 site right (cpt=19281) (Right); lumpectomy right (Right, 05/06/2019); and radiation right (Right, 2019).    Family History:  Her family history includes Allergies in her daughter and sister; Breast Cancer (age of onset: 62) in her self; Cancer in her maternal aunt; Cancer (age of onset: 73) in her brother; Cancer (age of onset: 85) in her paternal uncle; Diabetes in her mother; Heart Disorder in her father; Hypertension in her father.  Social History:  She  reports that she has never smoked. She has never been exposed to tobacco smoke. She has never used smokeless tobacco. She reports current alcohol use. She reports that she does not use drugs.    Tobacco:  She has never smoked tobacco.    CAGE Alcohol Screen:   CAGE screening score of 0 on 7/10/2025, showing low risk of alcohol abuse.      Patient Care Team:  Talita Lyon MD as PCP - General (Family Medicine)  Slava Yao MD (Surgical Oncology)  Edilson Lal MD (OTOLARYNGOLOGY)  Jermaine Harris MD (Allergy and Immunology)  Burt Niño MD (SURGERY, GENERAL)  Gama Azar MD (Radiation Oncology)  Romero, Griselle, Melania Henry OT as Occupational Therapist (Occupational Therapist)    Review of Systems     Negative     Objective:   Physical Exam  General Appearance:  Alert, cooperative, no distress, appears stated age   Head:  Normocephalic, without obvious abnormality, atraumatic   Eyes:  PERRL, conjunctiva/corneas clear, EOM's intact both eyes   Ears:  Normal TM's and external ear canals, both ears   Nose: Nares normal, septum midline,mucosa normal, no drainage or sinus tenderness   Throat: Lips, mucosa, and tongue normal; teeth and gums normal   Neck: Supple, symmetrical, trachea midline, no adenopathy;  thyroid: not enlarged, symmetric, no tenderness/mass/nodules; no carotid bruit or JVD   Back:   Symmetric, no curvature, ROM normal, no CVA tenderness   Lungs:   Clear to auscultation bilaterally, respirations unlabored   Heart:  Regular rate and rhythm, S1 and S2 normal, no murmur, rub, or gallop   Abdomen:   Soft, non-tender, bowel sounds  active all four quadrants,  no masses, no organomegaly   Pelvic: Deferred   Extremities: Extremities normal, atraumatic, no cyanosis or edema   Pulses: 2+ and symmetric   Skin: Skin color, texture, turgor normal, no rashes or lesions   Lymph nodes: Cervical, supraclavicular, and axillary nodes normal   Neurologic: Normal    and Breasts:  normal appearance, no masses or tenderness    /78 (BP Location: Left arm, Patient Position: Sitting, Cuff Size: adult)   Pulse 67   Temp 97.3 °F (36.3 °C) (Tympanic)   Resp 16   Ht 5' 2\" (1.575 m)   Wt 136 lb 3.2 oz (61.8 kg)   LMP  (LMP Unknown)   SpO2 98%   BMI 24.91 kg/m²  Estimated body mass index is 24.91 kg/m² as calculated from the following:    Height as of this encounter: 5' 2\" (1.575 m).    Weight as of this encounter: 136 lb 3.2 oz (61.8 kg).    Medicare Hearing Assessment:   Hearing Screening    Time taken: 7/18/2025  9:47 AM  Entry User: Tristan Caraballo  Screening Method: Finger Rub  Finger Rub Result: Pass         Visual Acuity:   Right Eye Visual Acuity: Corrected Right Eye Chart Acuity: 20/30   Left Eye Visual Acuity: Corrected Left Eye Chart Acuity: 20/20   Both Eyes Visual Acuity: Corrected Both Eyes Chart Acuity: 20/20   Able To Tolerate Visual Acuity: Yes        Assessment & Plan:   Carrie Byrne is a 69 year old female who presents for a Medicare Assessment.     1. Encounter for annual health examination (Primary)-patient is , adult children, 1 grandchild.  Exercising with walking, pickleball.  Planning to resume dance.  Has completed letrozole.  Mammogram up-to-date.  Labs today as ordered.  2. Recurrent major depressive disorder, in partial remission-stable on current medication.  3. Essential hypertension-blood pressure controlled on current medication  -     CBC, Platelet; No Differential; Future; Expected date: 07/18/2025  -     Lipid Panel; Future; Expected date: 07/18/2025  -     Comp Metabolic Panel (14); Future; Expected date:  07/18/2025  -     CBC, Platelet; No Differential  -     Lipid Panel  -     Comp Metabolic Panel (14)  4. Moderate persistent asthma without complication (HCC)-uses albuterol as needed.  Reviewed the use of Flonase when having exacerbation.  5. Osteopenia of multiple sites  Overview:  2024 BMD was normal no treatment. Repeat BMD in 3 yrs  6. History of colon polyps  Overview:  1/2020, repeat colonoscopy 5 years  7. History of breast cancer-no evidence of disease  Overview:  2019  8. Non-seasonal allergic rhinitis, unspecified trigger-stable on current allergy medications.    Assessment & Plan      The patient indicates understanding of these issues and agrees to the plan.  Reinforced healthy diet, lifestyle, and exercise.      Return in about 6 months (around 1/18/2026) for hypertension.     Talita Lyon MD, 7/18/2025     Supplementary Documentation:   General Health:  In the past six months, have you lost more than 10 pounds without trying?: (Patient-Rptd) 2 - No  Has your appetite been poor?: (Patient-Rptd) No  Type of Diet: (Patient-Rptd) Balanced  How does the patient maintain a good energy level?: (Patient-Rptd) Appropriate Exercise, Daily Walks, Stretching  How would you describe your daily physical activity?: (Patient-Rptd) Moderate  How would you describe your current health state?: (Patient-Rptd) Good  How do you maintain positive mental well-being?: (Patient-Rptd) Social Interaction, Puzzles, Visiting Friends, Visiting Family  On a scale of 0 to 10, with 0 being no pain and 10 being severe pain, what is your pain level?: (Patient-Rptd) 0 - (None)  In the past six months, have you experienced urine leakage?: (Patient-Rptd) 0-No  At any time do you feel concerned for the safety/well-being of yourself and/or your children, in your home or elsewhere?: No  Have you had any immunizations at another office such as Influenza, Hepatitis B, Tetanus, or Pneumococcal?: (Patient-Rptd) Yes    Health Maintenance    Topic Date Due    COVID-19 Vaccine (5 - 2024-25 season) 09/01/2024    Annual Well Visit  01/01/2025    Colorectal Cancer Screening  01/08/2025    Mammogram  06/26/2025    Influenza Vaccine (1) 10/01/2025    Pap Smear  05/21/2029    DEXA Scan  Completed    Annual Depression Screening  Completed    Fall Risk Screening (Annual)  Completed    Pneumococcal Vaccine: 50+ Years  Completed    Zoster Vaccines  Completed    Meningococcal B Vaccine  Aged Out

## 2025-07-18 NOTE — PROGRESS NOTES
The following individual(s) verbally consented to be recorded using ambient AI listening technology and understand that they can each withdraw their consent to this listening technology at any point by asking the clinician to turn off or pause the recording:    Patient name: Carrie OBRIEN Chavez

## 2025-08-04 ENCOUNTER — HOSPITAL ENCOUNTER (OUTPATIENT)
Dept: MAMMOGRAPHY | Age: 70
Discharge: HOME | End: 2025-08-04
Attending: INTERNAL MEDICINE

## 2025-08-04 DIAGNOSIS — Z12.31 SCREENING MAMMOGRAM, ENCOUNTER FOR: ICD-10-CM

## 2025-08-04 PROCEDURE — 77067 SCR MAMMO BI INCL CAD: CPT | Performed by: INTERNAL MEDICINE

## 2025-08-04 PROCEDURE — 77063 BREAST TOMOSYNTHESIS BI: CPT | Performed by: INTERNAL MEDICINE

## 2025-08-05 ENCOUNTER — TELEPHONE (OUTPATIENT)
Facility: LOCATION | Age: 70
End: 2025-08-05

## 2025-08-14 DIAGNOSIS — J30.89 NON-SEASONAL ALLERGIC RHINITIS, UNSPECIFIED TRIGGER: Chronic | ICD-10-CM

## 2025-08-19 RX ORDER — MONTELUKAST SODIUM 10 MG/1
10 TABLET ORAL NIGHTLY
Qty: 90 TABLET | Refills: 3 | Status: SHIPPED | OUTPATIENT
Start: 2025-08-19

## (undated) DEVICE — NON-ADHERENT DRESSING: Brand: TELFA

## (undated) DEVICE — SUTURE ETHIBOND EXCEL 2-0 SH

## (undated) DEVICE — CAUTERY BLADE 2IN INS E1455

## (undated) DEVICE — 3M™ TEGADERM™ TRANSPARENT FILM DRESSING, 1626W, 4 IN X 4-3/4 IN (10 CM X 12 CM), 50 EACH/CARTON, 4 CARTON/CASE: Brand: 3M™ TEGADERM™

## (undated) DEVICE — Device: Brand: CUSTOM PROCEDURE KIT

## (undated) DEVICE — INSULATED BLADE ELECTRODE 6.5

## (undated) DEVICE — DRAPE SHEET LG

## (undated) DEVICE — SNARE OPTMZ PLPCTM TRP

## (undated) DEVICE — MAJOR GENERAL: Brand: MEDLINE INDUSTRIES, INC.

## (undated) DEVICE — SNARE CAPTI HEX STIFF MEDIUM

## (undated) DEVICE — APPLIER LICACLIP MCA MED 23.8

## (undated) DEVICE — X-RAY DETECTABLE SPONGES,16 PLY: Brand: VISTEC

## (undated) DEVICE — VEST SRG 3 MED CUP R/L

## (undated) DEVICE — STERILE LATEX POWDER-FREE SURGICAL GLOVESWITH NITRILE COATING: Brand: PROTEXIS

## (undated) DEVICE — GAMMEX® PI HYBRID SIZE 7.5, STERILE POWDER-FREE SURGICAL GLOVE, POLYISOPRENE AND NEOPRENE BLEND: Brand: GAMMEX

## (undated) DEVICE — PROXIMATE SKIN STAPLERS (35 WIDE) CONTAINS 35 STAINLESS STEEL STAPLES (FIXED HEAD): Brand: PROXIMATE

## (undated) DEVICE — SPONGE: SPECIALTY PEANUT XR 100/CS: Brand: MEDICAL ACTION INDUSTRIES

## (undated) DEVICE — MINOR GENERAL: Brand: MEDLINE INDUSTRIES, INC.

## (undated) DEVICE — IMPERVIOUS STOCKINETTE: Brand: DEROYAL

## (undated) DEVICE — SUTURE PROLENE 2-0 SH

## (undated) DEVICE — ADHESIVE MASTISOL 2/3CC VL

## (undated) DEVICE — TOWEL OR BLU 16X26 STRL

## (undated) DEVICE — SOL  .9 1000ML BTL

## (undated) DEVICE — 35 ML SYRINGE REGULAR TIP: Brand: MONOJECT

## (undated) DEVICE — GAUZE SPONGES,12 PLY: Brand: CURITY

## (undated) DEVICE — Device: Brand: DEFENDO AIR/WATER/SUCTION AND BIOPSY VALVE

## (undated) DEVICE — SUTURE SILK 2-0 SH

## (undated) DEVICE — MEDI-VAC NON-CONDUCTIVE SUCTION TUBING 6MM X 1.8M (6FT.) L: Brand: CARDINAL HEALTH

## (undated) DEVICE — LINE MNTR ADLT SET O2 INTMD

## (undated) DEVICE — VIOLET BRAIDED (POLYGLACTIN 910), SYNTHETIC ABSORBABLE SUTURE: Brand: COATED VICRYL

## (undated) DEVICE — UNDYED BRAIDED (POLYGLACTIN 910), SYNTHETIC ABSORBABLE SUTURE: Brand: COATED VICRYL

## (undated) DEVICE — 3M™ STERI-STRIP™ REINFORCED ADHESIVE SKIN CLOSURES, R1547, 1/2 IN X 4 IN (12 MM X 100 MM), 6 STRIPS/ENVELOPE: Brand: 3M™ STERI-STRIP™

## (undated) DEVICE — SUTURE PROLENE 3-0 8832H

## (undated) DEVICE — SUTURE VICRYL 3-0 SH

## (undated) DEVICE — SUTURE VICRYL 2-0 FS-1

## (undated) NOTE — LETTER
[facility]  Administration of Anesthesia  1. Dilcia Elias, or _________________________________ acting on her behalf, (Patient) (Dependent/Representative) request to receive anesthesia for my pending procedure/operation/treatment. infections, high spinal block, spinal bleeding, seizure, cardiac arrest and death. 7. AWARENESS: I understand that it is possible (but unlikely) to have explicit memory of events from the operating room while under general anesthesia.   8. ELECTROCONVULSIV unconscious pt /Relationship    My signature below affirms that prior to the time of the procedure, I have explained to the patient and/or his/her guardian, the risks and benefits of undergoing anesthesia, as well as any reasonable alternatives.     _______

## (undated) NOTE — LETTER
[address], [place]  [address]  INFORMED CONSENT FOR TRANSFUSION OF BLOOD OR BLOOD PRODUCTS  My physician has informed me of the nature, purpose, benefits and risks of transfusion for blood and blood components that ______________________________________________  (Signature of Patient)                                                            (Responsible party in case of Minor,

## (undated) NOTE — LETTER
[address], [place]  Authorization for Surgical Operation or Procedure  Date: ______________       Time: _______________  1.  I hereby authorize Ruthy Melgar  my physician and the assistant, to perform the following operation and/or pro 5. I consent to the photographing of the operations or procedures to be performed for the purposes of advancing medicine, science, and/or education, provided my identity is not revealed.  If the procedure has been videotaped, the physician/surgeon will obta risks and benefits involved in the proposed treatment and any reasonable alternative to the proposed treatment. I have also explained the risks and benefits involved in the refusal of the proposed treatment and have answered the patient's questions.  If I h

## (undated) NOTE — LETTER
[address], [place]  Authorization for Invasive Procedures  1.  I hereby authorize Dr. Andrea Yeung*** , my physician and whomever may be designated as the doctor's assistant, to perform the following operation and/or procedure:  CYDNEY performed for the purposes of advancing medicine, science, and/or education, provided my identity is not revealed. If the procedure has been videotaped, the physician/surgeon will obtain the original videotape.  The hospital will not be responsible for stor My signature below affirms that prior to the time of the procedure, I have explained to the patient and/or her legal representative, the risks and benefits involved in the proposed treatment and any reasonable alternative to the proposed treatment.  I have

## (undated) NOTE — MR AVS SNAPSHOT
[address], [address] [place] [address]  [phone]               Thank you for choosing us for your health care visit with Amelia Barreto.  Gallito Francisco MD.  We are glad to serve you and happy to provide you with this summary Commonly known as:  WELLBUTRIN XL           Fexofenadine HCl 60 MG Tabs   Take  by mouth. Commonly known as:  ALLEGRA           Mometasone Furoate 220 MCG/INH Aepb   Inhale 2 puffs into the lungs daily.    Commonly known as:  ASMANEX 120 METERED DOSES Imaging:  BOLA SCREENING BILAT (HUI=[id number])    Instructions:   To schedule a test at any [facility], call Central Scheduling at [phone], Monday through Friday between 7:30am to 6pm and on Saturday between 8am and Pedro Hayes numbers can create reimbursement difficulties for you.       Referral Information     Referral Order Referred to [address] Phone Visits Status Diagnosis                                 Routine physical examination [[id number]]           Routine physical exam

## (undated) NOTE — Clinical Note
Poppy Castro,  She is still having significant hot flashes despite Gabapentin. She will resume flax seed oil. If no improvement, recommend to consider switching from sertraline to venlafaxine as the latter can treat hot flashes.   Thanks,  [employer]

## (undated) NOTE — MR AVS SNAPSHOT
[facility] SPECIALTY [facility] [place] [address] [address] [id number] [phone]               Thank you for choosing us for your health care visit with Romain Mathew MD.  We are glad to serve you and happy to provide you with this summary o Commonly known as:  PATANOL           Sertraline HCl 50 MG Tabs   TAKE 1 TABLET BY MOUTH EVERY DAY   Commonly known as:  ZOLOFT           triamcinolone acetonide 0.1 % Crea   Apply topically 2 (two) times daily.    Commonly known as:  KENALOG           Zolp

## (undated) NOTE — MR AVS SNAPSHOT
[facility] SPECIALTY [facility] - [place] [address] [place]  [id number] [phone]               Thank you for choosing us for your health care visit with Sarah Martinez MD.  We are glad to serve you and happy to provide you with this summary o Other reaction(s): Facial Swelling    Sulfa Antibiotics     Other reaction(s): anaphlaxsis                Today's Vital Signs     BP Pulse Temp Height Weight BMI    140/98 mmHg 72 98.3 °F (36.8 °C) (Oral) 5' 1\" (1.549 m) 133 lb 6.4 oz (60.51 kg) 25.22 kg discharge instructions in [employer] by going to Visits < Admission Summaries. If you've been to the Emergency Department or your doctor's office, you can view your past visit information in [employer] by going to Visits < Visit Summaries. Spondo questions?

## (undated) NOTE — LETTER
[facility]  Administration of Anesthesia  1. chai Vicente _________________________________ acting on her behalf, (Patient) (Dependent/Representative) request to receive anesthesia for my pending procedure/operation/treatment. infections, high spinal block, spinal bleeding, seizure, cardiac arrest and death. 7. AWARENESS: I understand that it is possible (but unlikely) to have explicit memory of events from the operating room while under general anesthesia.   8. ELECTROCONVULSIV unconscious pt /Relationship    My signature below affirms that prior to the time of the procedure, I have explained to the patient and/or his/her guardian, the risks and benefits of undergoing anesthesia, as well as any reasonable alternatives.     _______

## (undated) NOTE — LETTER
AUTHORIZATION FOR SURGICAL OPERATION OR OTHER PROCEDURE    1.  I hereby authorize Dr. Sung Levy , and [facility] assigned to my case to perform the following operation and/or procedure at the [facility]:    ___________________________ Patient Name:  ______________________________________________________  (please print)      Patient signature:  ___________________________________________________             Relationship to Patient:           []  Parent    Responsible person

## (undated) NOTE — LETTER
ASTH[employer] PLAN for Demetris Pratt     : 1955     Date: 19  Doctor: Talita Cerrato MD  Phone for doctor or clinic: Kristel Hamm , [address], [address] [place] [address] [address], [address] [id number] [phone]           ACT Montelukast Sodium 10 MG Oral Tab Take 1 tablet (10 mg total) by mouth nightly.  AT BEDTIME    Sympathomimetics Instructions    Albuterol Sulfate HFA (PROAIR HFA) 108 (90 Base) MCG/ACT Inhalation Aero Soln INHALE 2 PUFFS BY MOUTH FOUR TIMES DAILY AS NEEDED

## (undated) NOTE — LETTER
[address] [place] [address] Rd, [place], [place]     AUTHORIZATION FOR SURGICAL OPERATION OR PROCEDURE    I hereby authorize Dr. Ely Sevilla MD, my Physician(s) and whomever may be designated as the doctor's Assistant, to perform the foll 4. I consent to the photographing of procedure(s) to be performed for the purposes of advancing medicine, science and/or education, provided my identity is not revealed.  If the procedure has been videotaped, the physician/surgeon will obtain the original v (Witness signature)                                                                                                  (Date)                                (Time)  STATEMENT OF PHYSICIAN My signature below affirms that prior to the time of the procedure;  I

## (undated) NOTE — LETTER
[address] [place] [address] Rd, [place], [place]     AUTHORIZATION FOR SURGICAL OPERATION OR PROCEDURE    I hereby authorize                                     my Physician(s) and whomever may be designated as the doctor's Assistant, to Abida Orellana 4. I consent to the photographing of procedure(s) to be performed for the purposes of advancing medicine, science and/or education, provided my identity is not revealed.  If the procedure has been videotaped, the physician/surgeon will obtain the original v (Witness signature)                                                                                                  (Date)                                (Time)  STATEMENT OF PHYSICIAN My signature below affirms that prior to the time of the procedure;  I

## (undated) NOTE — LETTER
12/12/2024    Carrie Byrne        [address]        [facility] [address]            Dear Carrie Byrne,      Our records indicate that you are due for an appointment for a Colonoscopy with Kwame Wagner MD. Our doctors are booking out about 3-6 months in advance for procedures.     Please call our office to schedule this appointment.  Your medical well-being is important to us.    If your insurance requires a referral, please call your primary care office to request one.      Thank you,      The Physicians and Staff at [facility]

## (undated) NOTE — LETTER
[facility]  [address], [address] [address]  [address]: [phone]  FAX: [phone]    Medical Clearance Request  4/17/19    Rosas Conley MD  [address] [place] [id number]  [facility] [place]    The p

## (undated) NOTE — LETTER
[facility]  [address] [facility] [address]  PH: [phone]  FAX: [phone]    Carrie Byrne        [address] [facility] [address]            Dear Carrie Byrne,      Our records indicate that you are due for an appointment for a Colonoscopy with Kwame Wagner MD. Our doctors are booking out about 3-6 months in advance for procedures.     Please call our office to schedule this appointment.  Your medical well-being is important to us.    If your insurance requires a referral, please call your primary care office to request one.      Thank you,      The Physicians and Staff at [facility]